# Patient Record
Sex: FEMALE | Race: OTHER | HISPANIC OR LATINO | URBAN - METROPOLITAN AREA
[De-identification: names, ages, dates, MRNs, and addresses within clinical notes are randomized per-mention and may not be internally consistent; named-entity substitution may affect disease eponyms.]

---

## 2019-04-01 ENCOUNTER — OUTPATIENT (OUTPATIENT)
Dept: OUTPATIENT SERVICES | Facility: HOSPITAL | Age: 84
LOS: 1 days | End: 2019-04-01
Payer: MEDICAID

## 2019-04-01 PROCEDURE — G9001: CPT

## 2019-04-20 ENCOUNTER — INPATIENT (INPATIENT)
Facility: HOSPITAL | Age: 84
LOS: 13 days | Discharge: HOME | DRG: 381 | End: 2019-05-04
Attending: STUDENT IN AN ORGANIZED HEALTH CARE EDUCATION/TRAINING PROGRAM | Admitting: STUDENT IN AN ORGANIZED HEALTH CARE EDUCATION/TRAINING PROGRAM
Payer: MEDICAID

## 2019-04-20 VITALS
WEIGHT: 126.99 LBS | HEART RATE: 91 BPM | TEMPERATURE: 98 F | DIASTOLIC BLOOD PRESSURE: 82 MMHG | OXYGEN SATURATION: 97 % | HEIGHT: 65 IN | SYSTOLIC BLOOD PRESSURE: 136 MMHG | RESPIRATION RATE: 18 BRPM

## 2019-04-20 DIAGNOSIS — K31.0 ACUTE DILATATION OF STOMACH: ICD-10-CM

## 2019-04-20 LAB
ALBUMIN SERPL ELPH-MCNC: 3.9 G/DL — SIGNIFICANT CHANGE UP (ref 3.3–5.2)
ALP SERPL-CCNC: 70 U/L — SIGNIFICANT CHANGE UP (ref 40–120)
ALT FLD-CCNC: 11 U/L — SIGNIFICANT CHANGE UP
ANION GAP SERPL CALC-SCNC: 15 MMOL/L — SIGNIFICANT CHANGE UP (ref 5–17)
APTT BLD: 32.3 SEC — SIGNIFICANT CHANGE UP (ref 27.5–36.3)
AST SERPL-CCNC: 20 U/L — SIGNIFICANT CHANGE UP
BASOPHILS # BLD AUTO: 0 K/UL — SIGNIFICANT CHANGE UP (ref 0–0.2)
BASOPHILS NFR BLD AUTO: 0.2 % — SIGNIFICANT CHANGE UP (ref 0–2)
BILIRUB SERPL-MCNC: 0.3 MG/DL — LOW (ref 0.4–2)
BUN SERPL-MCNC: 18 MG/DL — SIGNIFICANT CHANGE UP (ref 8–20)
CALCIUM SERPL-MCNC: 10.2 MG/DL — SIGNIFICANT CHANGE UP (ref 8.6–10.2)
CHLORIDE SERPL-SCNC: 93 MMOL/L — LOW (ref 98–107)
CK SERPL-CCNC: 72 U/L — SIGNIFICANT CHANGE UP (ref 25–170)
CO2 SERPL-SCNC: 28 MMOL/L — SIGNIFICANT CHANGE UP (ref 22–29)
CREAT SERPL-MCNC: 0.67 MG/DL — SIGNIFICANT CHANGE UP (ref 0.5–1.3)
EOSINOPHIL # BLD AUTO: 0 K/UL — SIGNIFICANT CHANGE UP (ref 0–0.5)
EOSINOPHIL NFR BLD AUTO: 0.2 % — SIGNIFICANT CHANGE UP (ref 0–6)
GLUCOSE SERPL-MCNC: 148 MG/DL — HIGH (ref 70–115)
HCT VFR BLD CALC: 42.4 % — SIGNIFICANT CHANGE UP (ref 37–47)
HGB BLD-MCNC: 14.6 G/DL — SIGNIFICANT CHANGE UP (ref 12–16)
INR BLD: 1.01 RATIO — SIGNIFICANT CHANGE UP (ref 0.88–1.16)
LIDOCAIN IGE QN: 12 U/L — LOW (ref 22–51)
LYMPHOCYTES # BLD AUTO: 1.1 K/UL — SIGNIFICANT CHANGE UP (ref 1–4.8)
LYMPHOCYTES # BLD AUTO: 18.7 % — LOW (ref 20–55)
MAGNESIUM SERPL-MCNC: 1.8 MG/DL — SIGNIFICANT CHANGE UP (ref 1.6–2.6)
MCHC RBC-ENTMCNC: 31.3 PG — HIGH (ref 27–31)
MCHC RBC-ENTMCNC: 34.4 G/DL — SIGNIFICANT CHANGE UP (ref 32–36)
MCV RBC AUTO: 90.8 FL — SIGNIFICANT CHANGE UP (ref 81–99)
MONOCYTES # BLD AUTO: 0.4 K/UL — SIGNIFICANT CHANGE UP (ref 0–0.8)
MONOCYTES NFR BLD AUTO: 7.5 % — SIGNIFICANT CHANGE UP (ref 3–10)
NEUTROPHILS # BLD AUTO: 4.3 K/UL — SIGNIFICANT CHANGE UP (ref 1.8–8)
NEUTROPHILS NFR BLD AUTO: 73.2 % — HIGH (ref 37–73)
PLATELET # BLD AUTO: 251 K/UL — SIGNIFICANT CHANGE UP (ref 150–400)
POTASSIUM SERPL-MCNC: 4.6 MMOL/L — SIGNIFICANT CHANGE UP (ref 3.5–5.3)
POTASSIUM SERPL-SCNC: 4.6 MMOL/L — SIGNIFICANT CHANGE UP (ref 3.5–5.3)
PROT SERPL-MCNC: 7.7 G/DL — SIGNIFICANT CHANGE UP (ref 6.6–8.7)
PROTHROM AB SERPL-ACNC: 11.6 SEC — SIGNIFICANT CHANGE UP (ref 10–12.9)
RBC # BLD: 4.67 M/UL — SIGNIFICANT CHANGE UP (ref 4.4–5.2)
RBC # FLD: 13 % — SIGNIFICANT CHANGE UP (ref 11–15.6)
SODIUM SERPL-SCNC: 136 MMOL/L — SIGNIFICANT CHANGE UP (ref 135–145)
TSH SERPL-MCNC: 3.68 UIU/ML — SIGNIFICANT CHANGE UP (ref 0.27–4.2)
WBC # BLD: 5.8 K/UL — SIGNIFICANT CHANGE UP (ref 4.8–10.8)
WBC # FLD AUTO: 5.8 K/UL — SIGNIFICANT CHANGE UP (ref 4.8–10.8)

## 2019-04-20 PROCEDURE — 74177 CT ABD & PELVIS W/CONTRAST: CPT | Mod: 26

## 2019-04-20 PROCEDURE — 71045 X-RAY EXAM CHEST 1 VIEW: CPT | Mod: 26,76

## 2019-04-20 PROCEDURE — 99223 1ST HOSP IP/OBS HIGH 75: CPT

## 2019-04-20 PROCEDURE — 99285 EMERGENCY DEPT VISIT HI MDM: CPT

## 2019-04-20 RX ORDER — DEXTROSE 50 % IN WATER 50 %
25 SYRINGE (ML) INTRAVENOUS ONCE
Qty: 0 | Refills: 0 | Status: DISCONTINUED | OUTPATIENT
Start: 2019-04-20 | End: 2019-05-04

## 2019-04-20 RX ORDER — ONDANSETRON 8 MG/1
4 TABLET, FILM COATED ORAL ONCE
Qty: 0 | Refills: 0 | Status: COMPLETED | OUTPATIENT
Start: 2019-04-20 | End: 2019-04-20

## 2019-04-20 RX ORDER — INSULIN LISPRO 100/ML
VIAL (ML) SUBCUTANEOUS
Qty: 0 | Refills: 0 | Status: DISCONTINUED | OUTPATIENT
Start: 2019-04-20 | End: 2019-04-26

## 2019-04-20 RX ORDER — GLUCAGON INJECTION, SOLUTION 0.5 MG/.1ML
1 INJECTION, SOLUTION SUBCUTANEOUS ONCE
Qty: 0 | Refills: 0 | Status: DISCONTINUED | OUTPATIENT
Start: 2019-04-20 | End: 2019-05-04

## 2019-04-20 RX ORDER — SODIUM CHLORIDE 9 MG/ML
1000 INJECTION INTRAMUSCULAR; INTRAVENOUS; SUBCUTANEOUS
Qty: 0 | Refills: 0 | Status: DISCONTINUED | OUTPATIENT
Start: 2019-04-20 | End: 2019-04-25

## 2019-04-20 RX ORDER — SODIUM CHLORIDE 9 MG/ML
1000 INJECTION, SOLUTION INTRAVENOUS
Qty: 0 | Refills: 0 | Status: DISCONTINUED | OUTPATIENT
Start: 2019-04-20 | End: 2019-05-04

## 2019-04-20 RX ORDER — ONDANSETRON 8 MG/1
4 TABLET, FILM COATED ORAL EVERY 6 HOURS
Qty: 0 | Refills: 0 | Status: DISCONTINUED | OUTPATIENT
Start: 2019-04-20 | End: 2019-05-04

## 2019-04-20 RX ORDER — ENOXAPARIN SODIUM 100 MG/ML
40 INJECTION SUBCUTANEOUS DAILY
Qty: 0 | Refills: 0 | Status: DISCONTINUED | OUTPATIENT
Start: 2019-04-20 | End: 2019-04-21

## 2019-04-20 RX ORDER — PANTOPRAZOLE SODIUM 20 MG/1
40 TABLET, DELAYED RELEASE ORAL EVERY 12 HOURS
Qty: 0 | Refills: 0 | Status: DISCONTINUED | OUTPATIENT
Start: 2019-04-20 | End: 2019-04-21

## 2019-04-20 RX ORDER — DEXTROSE 50 % IN WATER 50 %
15 SYRINGE (ML) INTRAVENOUS ONCE
Qty: 0 | Refills: 0 | Status: DISCONTINUED | OUTPATIENT
Start: 2019-04-20 | End: 2019-05-04

## 2019-04-20 RX ORDER — SODIUM CHLORIDE 9 MG/ML
1000 INJECTION INTRAMUSCULAR; INTRAVENOUS; SUBCUTANEOUS
Qty: 0 | Refills: 0 | Status: DISCONTINUED | OUTPATIENT
Start: 2019-04-20 | End: 2019-04-21

## 2019-04-20 RX ORDER — MORPHINE SULFATE 50 MG/1
2 CAPSULE, EXTENDED RELEASE ORAL ONCE
Qty: 0 | Refills: 0 | Status: DISCONTINUED | OUTPATIENT
Start: 2019-04-20 | End: 2019-04-20

## 2019-04-20 RX ORDER — SODIUM CHLORIDE 9 MG/ML
500 INJECTION INTRAMUSCULAR; INTRAVENOUS; SUBCUTANEOUS ONCE
Qty: 0 | Refills: 0 | Status: COMPLETED | OUTPATIENT
Start: 2019-04-20 | End: 2019-04-20

## 2019-04-20 RX ORDER — INSULIN LISPRO 100/ML
VIAL (ML) SUBCUTANEOUS
Qty: 0 | Refills: 0 | Status: DISCONTINUED | OUTPATIENT
Start: 2019-04-20 | End: 2019-04-20

## 2019-04-20 RX ORDER — DEXTROSE 50 % IN WATER 50 %
12.5 SYRINGE (ML) INTRAVENOUS ONCE
Qty: 0 | Refills: 0 | Status: DISCONTINUED | OUTPATIENT
Start: 2019-04-20 | End: 2019-05-04

## 2019-04-20 RX ADMIN — SODIUM CHLORIDE 150 MILLILITER(S): 9 INJECTION INTRAMUSCULAR; INTRAVENOUS; SUBCUTANEOUS at 13:24

## 2019-04-20 RX ADMIN — MORPHINE SULFATE 2 MILLIGRAM(S): 50 CAPSULE, EXTENDED RELEASE ORAL at 19:41

## 2019-04-20 RX ADMIN — MORPHINE SULFATE 2 MILLIGRAM(S): 50 CAPSULE, EXTENDED RELEASE ORAL at 22:03

## 2019-04-20 RX ADMIN — SODIUM CHLORIDE 250 MILLILITER(S): 9 INJECTION INTRAMUSCULAR; INTRAVENOUS; SUBCUTANEOUS at 22:04

## 2019-04-20 RX ADMIN — SODIUM CHLORIDE 100 MILLILITER(S): 9 INJECTION INTRAMUSCULAR; INTRAVENOUS; SUBCUTANEOUS at 23:04

## 2019-04-20 RX ADMIN — ENOXAPARIN SODIUM 40 MILLIGRAM(S): 100 INJECTION SUBCUTANEOUS at 22:06

## 2019-04-20 RX ADMIN — ONDANSETRON 4 MILLIGRAM(S): 8 TABLET, FILM COATED ORAL at 19:42

## 2019-04-20 RX ADMIN — SODIUM CHLORIDE 1000 MILLILITER(S): 9 INJECTION INTRAMUSCULAR; INTRAVENOUS; SUBCUTANEOUS at 13:24

## 2019-04-20 RX ADMIN — SODIUM CHLORIDE 150 MILLILITER(S): 9 INJECTION INTRAMUSCULAR; INTRAVENOUS; SUBCUTANEOUS at 19:44

## 2019-04-20 RX ADMIN — ONDANSETRON 4 MILLIGRAM(S): 8 TABLET, FILM COATED ORAL at 13:24

## 2019-04-20 NOTE — ED PROVIDER NOTE - CLINICAL SUMMARY MEDICAL DECISION MAKING FREE TEXT BOX
failure to thrive, vomited today with some abd pain and weight loss. urine, ct abd/pel, labs, fluids, reassess.

## 2019-04-20 NOTE — ED PROVIDER NOTE - PHYSICAL EXAMINATION
Gen: No acute distress, non toxic  HEENT: Mucous membranes moist, pink conjunctivae, EOMI  CV: RRR, nl s1/s2.  Resp: CTAB, normal rate and effort  GI: abd distended, tympanic, soft, no rebound. minor wincing with palpation of abd.   : No CVAT  Neuro: Awake, moving all 4 extremities  MSK: No spine or joint tenderness to palpation  Skin: No rashes. intact and perfused.

## 2019-04-20 NOTE — H&P ADULT - NSHPLABSRESULTS_GEN_ALL_CORE
LABS:                        14.6   5.8   )-----------( 251      ( 20 Apr 2019 13:20 )             42.4     04-20    136  |  93<L>  |  18.0  ----------------------------<  148<H>  4.6   |  28.0  |  0.67    Ca    10.2      20 Apr 2019 13:20  Mg     1.8     04-20    TPro  7.7  /  Alb  3.9  /  TBili  0.3<L>  /  DBili  x   /  AST  20  /  ALT  11  /  AlkPhos  70  04-20    PT/INR - ( 20 Apr 2019 13:20 )   PT: 11.6 sec;   INR: 1.01 ratio    PTT - ( 20 Apr 2019 13:20 )  PTT:32.3 sec

## 2019-04-20 NOTE — ED STATDOCS - PROGRESS NOTE DETAILS
89 y/o F pt with significant PMHx of Arthritis, DM and Dementia presents to the ED c/o failure to thrive onset 2-3 weeks ago. Family at bedside reports decreased PO intake/appetite, vomiting (brown in color), abdominal pain, unexpected weight loss (approximately 17 pounds) and refusal to speak. She was seen at the clinic two days ago who referred her to a specialist but her appointment is not until 5/7. Pt has not taken her medications today. Denies fever, chills, CP, SOB, diarrhea, or HA. No further complaints at this time.   Patient will be sent to the main for further evaluation and work up. Initial orders placed.

## 2019-04-20 NOTE — H&P ADULT - ASSESSMENT
89F w. dementia, dm otherwise healthy presenting w/ gastric outlet obstruction     npo, ivf  NGT  protonix  bid   GI consult for EGD  dvt ppx, scds

## 2019-04-20 NOTE — ED ADULT NURSE REASSESSMENT NOTE - NS ED NURSE REASSESS COMMENT FT1
Pt resting on stretcher no pain behaviors noted.  NGT to low suction with 700ml brown drainage.  Pt attempted to pull at tube a few times, family at bedside discouraging pt.  admission orders received,  awaiting bed assignment.

## 2019-04-20 NOTE — ED ADULT TRIAGE NOTE - CHIEF COMPLAINT QUOTE
patient failure to thrive for the past 2-3 weeks, hasn't been eating or drinking. hx of dementia. as per daughter the patient vomited brown emesis 2 weeks ago, a little vomit today, patient has been refuses to speak, but pointed to her belly when asked what's wrong.

## 2019-04-20 NOTE — ED ADULT NURSE NOTE - OBJECTIVE STATEMENT
Pt brought in by her daughter who states that pt has not been eating or drinking and has not been speaking.  Pt has hx of dementia but when daughter asks her if she's having pain pt will point to her belly.  Daughter states that pt is in pain but no pain behaviors present at this time.

## 2019-04-20 NOTE — ED PROVIDER NOTE - OBJECTIVE STATEMENT
89 y/o female hx dm, dementia, arthritis present for failure to thrive for 2-3 weeks. Per grandson (who translated) and daughter, pt has been eating less and less, vomited once today. Didn't take meds today, took them yesterday. Pt doesn't speak much at baseline, per family states may be even less recently. Points to abd when asked whats wrong. Weight loss ~17 pounds over past few months.     no ros given mental status/not answering questions.

## 2019-04-20 NOTE — ED ADULT NURSE NOTE - NSIMPLEMENTINTERV_GEN_ALL_ED
Implemented All Fall with Harm Risk Interventions:  [place] to call system. Call bell, personal items and telephone within reach. Instruct patient to call for assistance. Room bathroom lighting operational. Non-slip footwear when patient is off stretcher. Physically safe environment: no spills, clutter or unnecessary equipment. Stretcher in lowest position, wheels locked, appropriate side rails in place. Provide visual cue, wrist band, yellow gown, etc. Monitor gait and stability. Monitor for mental status changes and reorient to person, place, and time. Review medications for side effects contributing to fall risk. Reinforce activity limits and safety measures with patient and family. Provide visual clues: red socks.

## 2019-04-20 NOTE — H&P ADULT - NSHPPHYSICALEXAM_GEN_ALL_CORE
Gen NAD  HEENT eomi, mucus membranes dry   CTABL  s1s2  Abd s, nd, mild EG ttp , -G/R  Ext warm, <2sec cap refill

## 2019-04-20 NOTE — H&P ADULT - HISTORY OF PRESENT ILLNESS
89F w/ hx of dementia, DM presenting to ER w/ emesis, decreased po intake and EG pain. Pts family states that for past several weeks pt has had decreased PO intake, previously to this had normal appetite and was eating well. 2 weeks ago she had an episode of bilious emesis and again ths morning she had several episodes of emesis. Family denies changes in BMs, melena or hematochezia.   Pt has never had colonoscopy egd and denies fam hx of cancer. 89F w/ hx of dementia, DM presenting to ER w/ emesis, decreased po intake and EG pain. Pts family states that for past several weeks pt has had decreased PO intake, prior to this had normal appetite and was eating well. 2 weeks ago she had an episode of bilious emesis and again this morning she had several episodes of emesis. Family denies changes in BMs, melena or hematochezia.   Pt has never had colonoscopy egd and denies fam hx of cancer.

## 2019-04-21 DIAGNOSIS — K31.1 ADULT HYPERTROPHIC PYLORIC STENOSIS: ICD-10-CM

## 2019-04-21 DIAGNOSIS — F03.90 UNSPECIFIED DEMENTIA WITHOUT BEHAVIORAL DISTURBANCE: ICD-10-CM

## 2019-04-21 DIAGNOSIS — E11.9 TYPE 2 DIABETES MELLITUS WITHOUT COMPLICATIONS: ICD-10-CM

## 2019-04-21 LAB
ANION GAP SERPL CALC-SCNC: 12 MMOL/L — SIGNIFICANT CHANGE UP (ref 5–17)
BUN SERPL-MCNC: 15 MG/DL — SIGNIFICANT CHANGE UP (ref 8–20)
CALCIUM SERPL-MCNC: 9.1 MG/DL — SIGNIFICANT CHANGE UP (ref 8.6–10.2)
CHLORIDE SERPL-SCNC: 96 MMOL/L — LOW (ref 98–107)
CO2 SERPL-SCNC: 32 MMOL/L — HIGH (ref 22–29)
CREAT SERPL-MCNC: 0.68 MG/DL — SIGNIFICANT CHANGE UP (ref 0.5–1.3)
GLUCOSE BLDC GLUCOMTR-MCNC: 101 MG/DL — HIGH (ref 70–99)
GLUCOSE BLDC GLUCOMTR-MCNC: 68 MG/DL — LOW (ref 70–99)
GLUCOSE BLDC GLUCOMTR-MCNC: 73 MG/DL — SIGNIFICANT CHANGE UP (ref 70–99)
GLUCOSE BLDC GLUCOMTR-MCNC: 77 MG/DL — SIGNIFICANT CHANGE UP (ref 70–99)
GLUCOSE BLDC GLUCOMTR-MCNC: 85 MG/DL — SIGNIFICANT CHANGE UP (ref 70–99)
GLUCOSE SERPL-MCNC: 95 MG/DL — SIGNIFICANT CHANGE UP (ref 70–115)
HBA1C BLD-MCNC: 6.9 % — HIGH (ref 4–5.6)
HCT VFR BLD CALC: 38.5 % — SIGNIFICANT CHANGE UP (ref 37–47)
HGB BLD-MCNC: 12.9 G/DL — SIGNIFICANT CHANGE UP (ref 12–16)
MAGNESIUM SERPL-MCNC: 1.6 MG/DL — SIGNIFICANT CHANGE UP (ref 1.6–2.6)
MCHC RBC-ENTMCNC: 30.9 PG — SIGNIFICANT CHANGE UP (ref 27–31)
MCHC RBC-ENTMCNC: 33.5 G/DL — SIGNIFICANT CHANGE UP (ref 32–36)
MCV RBC AUTO: 92.1 FL — SIGNIFICANT CHANGE UP (ref 81–99)
PHOSPHATE SERPL-MCNC: 3.3 MG/DL — SIGNIFICANT CHANGE UP (ref 2.4–4.7)
PLATELET # BLD AUTO: 236 K/UL — SIGNIFICANT CHANGE UP (ref 150–400)
POTASSIUM SERPL-MCNC: 3.8 MMOL/L — SIGNIFICANT CHANGE UP (ref 3.5–5.3)
POTASSIUM SERPL-SCNC: 3.8 MMOL/L — SIGNIFICANT CHANGE UP (ref 3.5–5.3)
RBC # BLD: 4.18 M/UL — LOW (ref 4.4–5.2)
RBC # FLD: 13 % — SIGNIFICANT CHANGE UP (ref 11–15.6)
SODIUM SERPL-SCNC: 140 MMOL/L — SIGNIFICANT CHANGE UP (ref 135–145)
WBC # BLD: 4 K/UL — LOW (ref 4.8–10.8)
WBC # FLD AUTO: 4 K/UL — LOW (ref 4.8–10.8)

## 2019-04-21 PROCEDURE — 99222 1ST HOSP IP/OBS MODERATE 55: CPT

## 2019-04-21 PROCEDURE — 93306 TTE W/DOPPLER COMPLETE: CPT | Mod: 26

## 2019-04-21 PROCEDURE — 93010 ELECTROCARDIOGRAM REPORT: CPT

## 2019-04-21 PROCEDURE — 99223 1ST HOSP IP/OBS HIGH 75: CPT

## 2019-04-21 RX ORDER — POTASSIUM PHOSPHATE, MONOBASIC POTASSIUM PHOSPHATE, DIBASIC 236; 224 MG/ML; MG/ML
15 INJECTION, SOLUTION INTRAVENOUS ONCE
Qty: 0 | Refills: 0 | Status: COMPLETED | OUTPATIENT
Start: 2019-04-21 | End: 2019-04-21

## 2019-04-21 RX ORDER — MAGNESIUM SULFATE 500 MG/ML
2 VIAL (ML) INJECTION ONCE
Qty: 0 | Refills: 0 | Status: COMPLETED | OUTPATIENT
Start: 2019-04-21 | End: 2019-04-21

## 2019-04-21 RX ORDER — PANTOPRAZOLE SODIUM 20 MG/1
40 TABLET, DELAYED RELEASE ORAL
Qty: 0 | Refills: 0 | Status: DISCONTINUED | OUTPATIENT
Start: 2019-04-21 | End: 2019-04-23

## 2019-04-21 RX ORDER — SODIUM CHLORIDE 9 MG/ML
1000 INJECTION, SOLUTION INTRAVENOUS
Qty: 0 | Refills: 0 | Status: DISCONTINUED | OUTPATIENT
Start: 2019-04-21 | End: 2019-04-22

## 2019-04-21 RX ORDER — MAGNESIUM SULFATE 500 MG/ML
1 VIAL (ML) INJECTION ONCE
Qty: 0 | Refills: 0 | Status: DISCONTINUED | OUTPATIENT
Start: 2019-04-21 | End: 2019-04-21

## 2019-04-21 RX ADMIN — POTASSIUM PHOSPHATE, MONOBASIC POTASSIUM PHOSPHATE, DIBASIC 62.5 MILLIMOLE(S): 236; 224 INJECTION, SOLUTION INTRAVENOUS at 18:15

## 2019-04-21 RX ADMIN — Medication 50 GRAM(S): at 22:23

## 2019-04-21 RX ADMIN — SODIUM CHLORIDE 150 MILLILITER(S): 9 INJECTION, SOLUTION INTRAVENOUS at 22:46

## 2019-04-21 RX ADMIN — PANTOPRAZOLE SODIUM 40 MILLIGRAM(S): 20 TABLET, DELAYED RELEASE ORAL at 05:38

## 2019-04-21 RX ADMIN — ENOXAPARIN SODIUM 40 MILLIGRAM(S): 100 INJECTION SUBCUTANEOUS at 12:38

## 2019-04-21 RX ADMIN — PANTOPRAZOLE SODIUM 40 MILLIGRAM(S): 20 TABLET, DELAYED RELEASE ORAL at 17:49

## 2019-04-21 NOTE — PROGRESS NOTE ADULT - SUBJECTIVE AND OBJECTIVE BOX
INTERVAL HPI/OVERNIGHT EVENTS:  No acute overnight events reported. Patient seen this morning with her daughter at bedside. Patient with NGT to suction, dark-thick gastric contents seen on output. NTG flushed. Patient NPO, on IVFs. Patient denies major complaints nor issues. Afebrile, nontachy, normotensive.         MEDICATIONS  (STANDING):  dextrose 5%. 1000 milliLiter(s) (50 mL/Hr) IV Continuous <Continuous>  dextrose 50% Injectable 12.5 Gram(s) IV Push once  dextrose 50% Injectable 25 Gram(s) IV Push once  dextrose 50% Injectable 25 Gram(s) IV Push once  enoxaparin Injectable 40 milliGRAM(s) SubCutaneous daily  insulin lispro (HumaLOG) corrective regimen sliding scale   SubCutaneous three times a day before meals  pantoprazole  Injectable 40 milliGRAM(s) IV Push two times a day  sodium chloride 0.9%. 1000 milliLiter(s) (100 mL/Hr) IV Continuous <Continuous>  sodium chloride 0.9%. 1000 milliLiter(s) (150 mL/Hr) IV Continuous <Continuous>    MEDICATIONS  (PRN):  dextrose 40% Gel 15 Gram(s) Oral once PRN Blood Glucose LESS THAN 70 milliGRAM(s)/deciliter  glucagon  Injectable 1 milliGRAM(s) IntraMuscular once PRN Glucose LESS THAN 70 milligrams/deciliter  ondansetron Injectable 4 milliGRAM(s) IV Push every 6 hours PRN Nausea and/or Vomiting      Vital Signs Last 24 Hrs  T(C): 36.7 (21 Apr 2019 08:09), Max: 36.9 (20 Apr 2019 12:27)  T(F): 98 (21 Apr 2019 08:09), Max: 98.5 (20 Apr 2019 12:27)  HR: 84 (21 Apr 2019 02:42) (84 - 92)  BP: 129/76 (21 Apr 2019 08:09) (112/69 - 149/72)  BP(mean): --  RR: 18 (21 Apr 2019 08:09) (16 - 18)  SpO2: 98% (21 Apr 2019 08:09) (95% - 99%)    PE  Gen:  Pulm:  CV:  Abd:  :  Ext:  Vasc:  Neuro:      I&O's Detail    20 Apr 2019 07:01  -  21 Apr 2019 07:00  --------------------------------------------------------  IN:    sodium chloride 0.9%.: 500 mL  Total IN: 500 mL    OUT:    Nasoenteral Tube: 700 mL  Total OUT: 700 mL    Total NET: -200 mL          LABS:                        12.9   4.0   )-----------( 236      ( 21 Apr 2019 06:56 )             38.5     04-21    140  |  96<L>  |  15.0  ----------------------------<  95  3.8   |  32.0<H>  |  0.68    Ca    9.1      21 Apr 2019 06:56  Phos  3.3     04-21  Mg     1.6     04-21    TPro  7.7  /  Alb  3.9  /  TBili  0.3<L>  /  DBili  x   /  AST  20  /  ALT  11  /  AlkPhos  70  04-20    PT/INR - ( 20 Apr 2019 13:20 )   PT: 11.6 sec;   INR: 1.01 ratio         PTT - ( 20 Apr 2019 13:20 )  PTT:32.3 sec      RADIOLOGY & ADDITIONAL STUDIES: INTERVAL HPI/OVERNIGHT EVENTS:  No acute overnight events reported. Patient seen this morning with her daughter at bedside. Patient with NGT to suction, dark-thick gastric contents seen on output. NTG flushed. Patient NPO, on IVFs. Patient denies major complaints nor issues. Afebrile, nontachy, normotensive.         MEDICATIONS  (STANDING):  dextrose 5%. 1000 milliLiter(s) (50 mL/Hr) IV Continuous <Continuous>  dextrose 50% Injectable 12.5 Gram(s) IV Push once  dextrose 50% Injectable 25 Gram(s) IV Push once  dextrose 50% Injectable 25 Gram(s) IV Push once  enoxaparin Injectable 40 milliGRAM(s) SubCutaneous daily  insulin lispro (HumaLOG) corrective regimen sliding scale   SubCutaneous three times a day before meals  pantoprazole  Injectable 40 milliGRAM(s) IV Push two times a day  sodium chloride 0.9%. 1000 milliLiter(s) (100 mL/Hr) IV Continuous <Continuous>  sodium chloride 0.9%. 1000 milliLiter(s) (150 mL/Hr) IV Continuous <Continuous>    MEDICATIONS  (PRN):  dextrose 40% Gel 15 Gram(s) Oral once PRN Blood Glucose LESS THAN 70 milliGRAM(s)/deciliter  glucagon  Injectable 1 milliGRAM(s) IntraMuscular once PRN Glucose LESS THAN 70 milligrams/deciliter  ondansetron Injectable 4 milliGRAM(s) IV Push every 6 hours PRN Nausea and/or Vomiting      Vital Signs Last 24 Hrs  T(C): 36.7 (21 Apr 2019 08:09), Max: 36.9 (20 Apr 2019 12:27)  T(F): 98 (21 Apr 2019 08:09), Max: 98.5 (20 Apr 2019 12:27)  HR: 84 (21 Apr 2019 02:42) (84 - 92)  BP: 129/76 (21 Apr 2019 08:09) (112/69 - 149/72)  BP(mean): --  RR: 18 (21 Apr 2019 08:09) (16 - 18)  SpO2: 98% (21 Apr 2019 08:09) (95% - 99%)    PE  Gen: Not in acute distress  Pulm: Nonlabored breathing  CV: S1, S2  Abd: Soft, nondistended, mild epigastric tenderness, no guarding, no rebound  : No suprapubic tenderness  Ext: no pitting edema B/L  Vasc: 2+ radial and PT pulses B/L  Neuro: AAOX3      I&O's Detail    20 Apr 2019 07:01  -  21 Apr 2019 07:00  --------------------------------------------------------  IN:    sodium chloride 0.9%.: 500 mL  Total IN: 500 mL    OUT:    Nasoenteral Tube: 700 mL  Total OUT: 700 mL    Total NET: -200 mL          LABS:                        12.9   4.0   )-----------( 236      ( 21 Apr 2019 06:56 )             38.5     04-21    140  |  96<L>  |  15.0  ----------------------------<  95  3.8   |  32.0<H>  |  0.68    Ca    9.1      21 Apr 2019 06:56  Phos  3.3     04-21  Mg     1.6     04-21    TPro  7.7  /  Alb  3.9  /  TBili  0.3<L>  /  DBili  x   /  AST  20  /  ALT  11  /  AlkPhos  70  04-20    PT/INR - ( 20 Apr 2019 13:20 )   PT: 11.6 sec;   INR: 1.01 ratio         PTT - ( 20 Apr 2019 13:20 )  PTT:32.3 sec      RADIOLOGY & ADDITIONAL STUDIES: INTERVAL HPI/OVERNIGHT EVENTS:  No acute overnight events reported. Patient seen this morning with her daughter at bedside. Patient with NGT to suction, dark-thick gastric contents seen on output. NGT flushed. Patient NPO, on IVFs. Patient denies major complaints nor issues. Afebrile, nontachy, normotensive.         MEDICATIONS  (STANDING):  dextrose 5%. 1000 milliLiter(s) (50 mL/Hr) IV Continuous <Continuous>  dextrose 50% Injectable 12.5 Gram(s) IV Push once  dextrose 50% Injectable 25 Gram(s) IV Push once  dextrose 50% Injectable 25 Gram(s) IV Push once  enoxaparin Injectable 40 milliGRAM(s) SubCutaneous daily  insulin lispro (HumaLOG) corrective regimen sliding scale   SubCutaneous three times a day before meals  pantoprazole  Injectable 40 milliGRAM(s) IV Push two times a day  sodium chloride 0.9%. 1000 milliLiter(s) (100 mL/Hr) IV Continuous <Continuous>  sodium chloride 0.9%. 1000 milliLiter(s) (150 mL/Hr) IV Continuous <Continuous>    MEDICATIONS  (PRN):  dextrose 40% Gel 15 Gram(s) Oral once PRN Blood Glucose LESS THAN 70 milliGRAM(s)/deciliter  glucagon  Injectable 1 milliGRAM(s) IntraMuscular once PRN Glucose LESS THAN 70 milligrams/deciliter  ondansetron Injectable 4 milliGRAM(s) IV Push every 6 hours PRN Nausea and/or Vomiting      Vital Signs Last 24 Hrs  T(C): 36.7 (21 Apr 2019 08:09), Max: 36.9 (20 Apr 2019 12:27)  T(F): 98 (21 Apr 2019 08:09), Max: 98.5 (20 Apr 2019 12:27)  HR: 84 (21 Apr 2019 02:42) (84 - 92)  BP: 129/76 (21 Apr 2019 08:09) (112/69 - 149/72)  BP(mean): --  RR: 18 (21 Apr 2019 08:09) (16 - 18)  SpO2: 98% (21 Apr 2019 08:09) (95% - 99%)    PE  Gen: Not in acute distress  Pulm: Nonlabored breathing  CV: S1, S2  Abd: Soft, nondistended, mild epigastric tenderness, no guarding, no rebound  : No suprapubic tenderness  Ext: no pitting edema B/L  Vasc: 2+ radial and PT pulses B/L  Neuro: AAOX3      I&O's Detail    20 Apr 2019 07:01  -  21 Apr 2019 07:00  --------------------------------------------------------  IN:    sodium chloride 0.9%.: 500 mL  Total IN: 500 mL    OUT:    Nasoenteral Tube: 700 mL  Total OUT: 700 mL    Total NET: -200 mL          LABS:                        12.9   4.0   )-----------( 236      ( 21 Apr 2019 06:56 )             38.5     04-21    140  |  96<L>  |  15.0  ----------------------------<  95  3.8   |  32.0<H>  |  0.68    Ca    9.1      21 Apr 2019 06:56  Phos  3.3     04-21  Mg     1.6     04-21    TPro  7.7  /  Alb  3.9  /  TBili  0.3<L>  /  DBili  x   /  AST  20  /  ALT  11  /  AlkPhos  70  04-20    PT/INR - ( 20 Apr 2019 13:20 )   PT: 11.6 sec;   INR: 1.01 ratio         PTT - ( 20 Apr 2019 13:20 )  PTT:32.3 sec      RADIOLOGY & ADDITIONAL STUDIES:

## 2019-04-21 NOTE — PROGRESS NOTE ADULT - ASSESSMENT
A/P:  89F w. dementia, dm otherwise healthy presenting w/ gastric outlet obstruction     npo, ivf  NGT  protonix  bid   F/U GI consult  dvt ppx, scds

## 2019-04-22 LAB
ABO RH CONFIRMATION: SIGNIFICANT CHANGE UP
ANION GAP SERPL CALC-SCNC: 12 MMOL/L — SIGNIFICANT CHANGE UP (ref 5–17)
BUN SERPL-MCNC: 12 MG/DL — SIGNIFICANT CHANGE UP (ref 8–20)
CALCIUM SERPL-MCNC: 8.7 MG/DL — SIGNIFICANT CHANGE UP (ref 8.6–10.2)
CHLORIDE SERPL-SCNC: 100 MMOL/L — SIGNIFICANT CHANGE UP (ref 98–107)
CO2 SERPL-SCNC: 25 MMOL/L — SIGNIFICANT CHANGE UP (ref 22–29)
CREAT SERPL-MCNC: 0.54 MG/DL — SIGNIFICANT CHANGE UP (ref 0.5–1.3)
EOSINOPHIL # BLD AUTO: 0 K/UL — SIGNIFICANT CHANGE UP (ref 0–0.5)
EOSINOPHIL NFR BLD AUTO: 0.9 % — SIGNIFICANT CHANGE UP (ref 0–5)
GLUCOSE BLDC GLUCOMTR-MCNC: 113 MG/DL — HIGH (ref 70–99)
GLUCOSE BLDC GLUCOMTR-MCNC: 157 MG/DL — HIGH (ref 70–99)
GLUCOSE BLDC GLUCOMTR-MCNC: 196 MG/DL — HIGH (ref 70–99)
GLUCOSE SERPL-MCNC: 178 MG/DL — HIGH (ref 70–115)
HCT VFR BLD CALC: 34.8 % — LOW (ref 37–47)
HGB BLD-MCNC: 11.5 G/DL — LOW (ref 12–16)
LYMPHOCYTES # BLD AUTO: 0.8 K/UL — LOW (ref 1–4.8)
LYMPHOCYTES # BLD AUTO: 23.2 % — SIGNIFICANT CHANGE UP (ref 20–55)
MAGNESIUM SERPL-MCNC: 1.8 MG/DL — SIGNIFICANT CHANGE UP (ref 1.6–2.6)
MCHC RBC-ENTMCNC: 30.7 PG — SIGNIFICANT CHANGE UP (ref 27–31)
MCHC RBC-ENTMCNC: 33 G/DL — SIGNIFICANT CHANGE UP (ref 32–36)
MCV RBC AUTO: 93 FL — SIGNIFICANT CHANGE UP (ref 81–99)
MONOCYTES # BLD AUTO: 0.3 K/UL — SIGNIFICANT CHANGE UP (ref 0–0.8)
MONOCYTES NFR BLD AUTO: 8.7 % — SIGNIFICANT CHANGE UP (ref 3–10)
NEUTROPHILS # BLD AUTO: 2.2 K/UL — SIGNIFICANT CHANGE UP (ref 1.8–8)
NEUTROPHILS NFR BLD AUTO: 66.9 % — SIGNIFICANT CHANGE UP (ref 37–73)
PHOSPHATE SERPL-MCNC: 2.6 MG/DL — SIGNIFICANT CHANGE UP (ref 2.4–4.7)
PLATELET # BLD AUTO: 211 K/UL — SIGNIFICANT CHANGE UP (ref 150–400)
POTASSIUM SERPL-MCNC: 3.9 MMOL/L — SIGNIFICANT CHANGE UP (ref 3.5–5.3)
POTASSIUM SERPL-SCNC: 3.9 MMOL/L — SIGNIFICANT CHANGE UP (ref 3.5–5.3)
RBC # BLD: 3.74 M/UL — LOW (ref 4.4–5.2)
RBC # FLD: 13.1 % — SIGNIFICANT CHANGE UP (ref 11–15.6)
SODIUM SERPL-SCNC: 137 MMOL/L — SIGNIFICANT CHANGE UP (ref 135–145)
WBC # BLD: 3.3 K/UL — LOW (ref 4.8–10.8)
WBC # FLD AUTO: 3.3 K/UL — LOW (ref 4.8–10.8)

## 2019-04-22 PROCEDURE — 74018 RADEX ABDOMEN 1 VIEW: CPT | Mod: 26

## 2019-04-22 PROCEDURE — 43255 EGD CONTROL BLEEDING ANY: CPT

## 2019-04-22 PROCEDURE — 99231 SBSQ HOSP IP/OBS SF/LOW 25: CPT

## 2019-04-22 PROCEDURE — 93010 ELECTROCARDIOGRAM REPORT: CPT

## 2019-04-22 RX ORDER — SODIUM CHLORIDE 9 MG/ML
1000 INJECTION, SOLUTION INTRAVENOUS
Qty: 0 | Refills: 0 | Status: DISCONTINUED | OUTPATIENT
Start: 2019-04-22 | End: 2019-04-25

## 2019-04-22 RX ORDER — ACETAMINOPHEN 500 MG
1000 TABLET ORAL ONCE
Qty: 0 | Refills: 0 | Status: COMPLETED | OUTPATIENT
Start: 2019-04-22 | End: 2019-04-22

## 2019-04-22 RX ADMIN — Medication 400 MILLIGRAM(S): at 22:46

## 2019-04-22 RX ADMIN — Medication 2: at 17:13

## 2019-04-22 RX ADMIN — Medication 1000 MILLIGRAM(S): at 23:02

## 2019-04-22 RX ADMIN — PANTOPRAZOLE SODIUM 40 MILLIGRAM(S): 20 TABLET, DELAYED RELEASE ORAL at 17:59

## 2019-04-22 RX ADMIN — Medication 2: at 06:09

## 2019-04-22 RX ADMIN — SODIUM CHLORIDE 150 MILLILITER(S): 9 INJECTION, SOLUTION INTRAVENOUS at 06:25

## 2019-04-22 RX ADMIN — PANTOPRAZOLE SODIUM 40 MILLIGRAM(S): 20 TABLET, DELAYED RELEASE ORAL at 06:08

## 2019-04-22 NOTE — BRIEF OPERATIVE NOTE - NSICDXBRIEFPROCEDURE_GEN_ALL_CORE_FT
PROCEDURES:  Esophagogastroduodenoscopy (EGD) with cautery using heater probe 22-Apr-2019 12:28:27  LALY Young

## 2019-04-22 NOTE — OCCUPATIONAL THERAPY INITIAL EVALUATION ADULT - DEEP PRESSURE SENSATION, LUE, REHAB EVAL
not tested
Showering allowed/Walking-Indoors allowed/Walking-Outdoors allowed/Stairs allowed/Driving allowed

## 2019-04-22 NOTE — PROGRESS NOTE ADULT - SUBJECTIVE AND OBJECTIVE BOX
INTERVAL HPI/OVERNIGHT EVENTS:  No acute overnight events. Patient seen at bedside this AM with no major issues. Patient with dementia, daughter was at bedside, answered questions regarding plan of care. NGT output was 150, greatly reduced from previously which was 500, thick, dark red secretions noted in drain.         MEDICATIONS  (STANDING):  dextrose 5% + sodium chloride 0.3%. 1000 milliLiter(s) (150 mL/Hr) IV Continuous <Continuous>  dextrose 5%. 1000 milliLiter(s) (50 mL/Hr) IV Continuous <Continuous>  dextrose 50% Injectable 12.5 Gram(s) IV Push once  dextrose 50% Injectable 25 Gram(s) IV Push once  dextrose 50% Injectable 25 Gram(s) IV Push once  insulin lispro (HumaLOG) corrective regimen sliding scale   SubCutaneous three times a day before meals  pantoprazole  Injectable 40 milliGRAM(s) IV Push two times a day  sodium chloride 0.9%. 1000 milliLiter(s) (100 mL/Hr) IV Continuous <Continuous>    MEDICATIONS  (PRN):  dextrose 40% Gel 15 Gram(s) Oral once PRN Blood Glucose LESS THAN 70 milliGRAM(s)/deciliter  glucagon  Injectable 1 milliGRAM(s) IntraMuscular once PRN Glucose LESS THAN 70 milligrams/deciliter  ondansetron Injectable 4 milliGRAM(s) IV Push every 6 hours PRN Nausea and/or Vomiting      Vital Signs Last 24 Hrs  T(C): 36.8 (22 Apr 2019 07:47), Max: 37.2 (21 Apr 2019 20:27)  T(F): 98.2 (22 Apr 2019 07:47), Max: 98.9 (21 Apr 2019 20:27)  HR: 85 (22 Apr 2019 07:47) (80 - 85)  BP: 146/77 (22 Apr 2019 07:47) (130/71 - 149/78)  BP(mean): --  RR: 18 (22 Apr 2019 07:47) (18 - 18)  SpO2: 95% (22 Apr 2019 07:47) (94% - 96%)    PE  Gen: Not in acute distress  Pulm: Nonlabored breathing  CV: S1, S2  Abd: Soft, nondistended, mild epigastric tenderness, no guarding, no rebound  : No suprapubic tenderness  Ext: no pitting edema B/L  Vasc: 2+ radial and PT pulses B/L  Neuro: AAOX3      I&O's Detail    21 Apr 2019 07:01  -  22 Apr 2019 07:00  --------------------------------------------------------  IN:    dextrose 5% + sodium chloride 0.3%.: 1350 mL    sodium chloride 0.9%.: 300 mL  Total IN: 1650 mL    OUT:    Nasoenteral Tube: 150 mL  Total OUT: 150 mL    Total NET: 1500 mL          LABS:                        11.5   3.3   )-----------( 211      ( 22 Apr 2019 06:31 )             34.8     04-22    137  |  100  |  12.0  ----------------------------<  178<H>  3.9   |  25.0  |  0.54    Ca    8.7      22 Apr 2019 06:31  Phos  2.6     04-22  Mg     1.8     04-22    TPro  7.7  /  Alb  3.9  /  TBili  0.3<L>  /  DBili  x   /  AST  20  /  ALT  11  /  AlkPhos  70  04-20    PT/INR - ( 20 Apr 2019 13:20 )   PT: 11.6 sec;   INR: 1.01 ratio         PTT - ( 20 Apr 2019 13:20 )  PTT:32.3 sec      RADIOLOGY & ADDITIONAL STUDIES:

## 2019-04-22 NOTE — BRIEF OPERATIVE NOTE - OPERATION/FINDINGS
- Esophagus: LA grade D erosive esophagitis  - Stomach: Multiple gastric ulcers.  At the pylorus, there was an obstruction of unknown etiology.  On the superior aspect of the pylorus, there was a visible vessel that was treated with epinephrine injection and bipolar cautery using the gold probe.  There was no bleeding from the vessel at the end of the procedure.  - The duodenum was unable to be entered due to the obstruction.

## 2019-04-22 NOTE — PROGRESS NOTE ADULT - ASSESSMENT
A/P:  89F w/ dementia, DM otherwise healthy presenting w/ gastric outlet obstruction     npo, ivf  NGT  protonix  bid   GI pending endoscopy today  Cards cleared for endoscopy  dvt ppx, scds

## 2019-04-22 NOTE — OCCUPATIONAL THERAPY INITIAL EVALUATION ADULT - ADDITIONAL COMMENTS
Daughter present to provide history and in person interperter; Pt lives in a home with 3 [place] and rails to get in and no stairs inside and daughter helps pt get in and out of house with RW. Pt has aid on mondays to assist at home and tuesday through friday goes to a day care where they bath her and help her with bathroom needs if need and provide food and supervision. Pts daughter can assist when she goes home and she works as a  but can take time off if need. Pt was able to dress self and walk with RW at home prior with supervision. Pt is never alone according to daughter.

## 2019-04-22 NOTE — BRIEF OPERATIVE NOTE - COMMENTS
- LA grade D erosive esophagitis  - Multiple gastric ulcers, one with a visible vessel that was treated with epinephrine and bipolar cautery.  - Gastric outlet obstruction, etiology uncertain.    Will review the images with ACS attending to determine the next best course.  IV PPI BID.  Keep NPO  Nasogastric tube to LIWS  Maintain active type and screen

## 2019-04-23 DIAGNOSIS — Z71.89 OTHER SPECIFIED COUNSELING: ICD-10-CM

## 2019-04-23 LAB
ANION GAP SERPL CALC-SCNC: 9 MMOL/L — SIGNIFICANT CHANGE UP (ref 5–17)
BASOPHILS # BLD AUTO: 0 K/UL — SIGNIFICANT CHANGE UP (ref 0–0.2)
BASOPHILS NFR BLD AUTO: 0.2 % — SIGNIFICANT CHANGE UP (ref 0–2)
BUN SERPL-MCNC: 9 MG/DL — SIGNIFICANT CHANGE UP (ref 8–20)
CALCIUM SERPL-MCNC: 8.5 MG/DL — LOW (ref 8.6–10.2)
CHLORIDE SERPL-SCNC: 98 MMOL/L — SIGNIFICANT CHANGE UP (ref 98–107)
CO2 SERPL-SCNC: 26 MMOL/L — SIGNIFICANT CHANGE UP (ref 22–29)
CREAT SERPL-MCNC: 0.53 MG/DL — SIGNIFICANT CHANGE UP (ref 0.5–1.3)
EOSINOPHIL # BLD AUTO: 0 K/UL — SIGNIFICANT CHANGE UP (ref 0–0.5)
EOSINOPHIL NFR BLD AUTO: 0.9 % — SIGNIFICANT CHANGE UP (ref 0–6)
GLUCOSE BLDC GLUCOMTR-MCNC: 102 MG/DL — HIGH (ref 70–99)
GLUCOSE BLDC GLUCOMTR-MCNC: 104 MG/DL — HIGH (ref 70–99)
GLUCOSE BLDC GLUCOMTR-MCNC: 86 MG/DL — SIGNIFICANT CHANGE UP (ref 70–99)
GLUCOSE BLDC GLUCOMTR-MCNC: 97 MG/DL — SIGNIFICANT CHANGE UP (ref 70–99)
GLUCOSE SERPL-MCNC: 107 MG/DL — SIGNIFICANT CHANGE UP (ref 70–115)
HCT VFR BLD CALC: 33.8 % — LOW (ref 37–47)
HGB BLD-MCNC: 11.3 G/DL — LOW (ref 12–16)
LYMPHOCYTES # BLD AUTO: 1.3 K/UL — SIGNIFICANT CHANGE UP (ref 1–4.8)
LYMPHOCYTES # BLD AUTO: 24.8 % — SIGNIFICANT CHANGE UP (ref 20–55)
MAGNESIUM SERPL-MCNC: 1.7 MG/DL — SIGNIFICANT CHANGE UP (ref 1.6–2.6)
MCHC RBC-ENTMCNC: 30.8 PG — SIGNIFICANT CHANGE UP (ref 27–31)
MCHC RBC-ENTMCNC: 33.4 G/DL — SIGNIFICANT CHANGE UP (ref 32–36)
MCV RBC AUTO: 92.1 FL — SIGNIFICANT CHANGE UP (ref 81–99)
MONOCYTES # BLD AUTO: 0.3 K/UL — SIGNIFICANT CHANGE UP (ref 0–0.8)
MONOCYTES NFR BLD AUTO: 5.3 % — SIGNIFICANT CHANGE UP (ref 3–10)
NEUTROPHILS # BLD AUTO: 3.6 K/UL — SIGNIFICANT CHANGE UP (ref 1.8–8)
NEUTROPHILS NFR BLD AUTO: 68.6 % — SIGNIFICANT CHANGE UP (ref 37–73)
PHOSPHATE SERPL-MCNC: 2.7 MG/DL — SIGNIFICANT CHANGE UP (ref 2.4–4.7)
PLATELET # BLD AUTO: 200 K/UL — SIGNIFICANT CHANGE UP (ref 150–400)
POTASSIUM SERPL-MCNC: 3.5 MMOL/L — SIGNIFICANT CHANGE UP (ref 3.5–5.3)
POTASSIUM SERPL-SCNC: 3.5 MMOL/L — SIGNIFICANT CHANGE UP (ref 3.5–5.3)
RBC # BLD: 3.67 M/UL — LOW (ref 4.4–5.2)
RBC # FLD: 12.8 % — SIGNIFICANT CHANGE UP (ref 11–15.6)
SODIUM SERPL-SCNC: 133 MMOL/L — LOW (ref 135–145)
WBC # BLD: 5.3 K/UL — SIGNIFICANT CHANGE UP (ref 4.8–10.8)
WBC # FLD AUTO: 5.3 K/UL — SIGNIFICANT CHANGE UP (ref 4.8–10.8)

## 2019-04-23 PROCEDURE — 99233 SBSQ HOSP IP/OBS HIGH 50: CPT

## 2019-04-23 PROCEDURE — 74018 RADEX ABDOMEN 1 VIEW: CPT | Mod: 26

## 2019-04-23 RX ORDER — POTASSIUM CHLORIDE 20 MEQ
10 PACKET (EA) ORAL
Qty: 0 | Refills: 0 | Status: COMPLETED | OUTPATIENT
Start: 2019-04-23 | End: 2019-04-23

## 2019-04-23 RX ORDER — ENOXAPARIN SODIUM 100 MG/ML
40 INJECTION SUBCUTANEOUS DAILY
Qty: 0 | Refills: 0 | Status: DISCONTINUED | OUTPATIENT
Start: 2019-04-23 | End: 2019-04-23

## 2019-04-23 RX ORDER — MAGNESIUM SULFATE 500 MG/ML
2 VIAL (ML) INJECTION ONCE
Qty: 0 | Refills: 0 | Status: COMPLETED | OUTPATIENT
Start: 2019-04-23 | End: 2019-04-23

## 2019-04-23 RX ORDER — PANTOPRAZOLE SODIUM 20 MG/1
8 TABLET, DELAYED RELEASE ORAL
Qty: 80 | Refills: 0 | Status: DISCONTINUED | OUTPATIENT
Start: 2019-04-23 | End: 2019-04-30

## 2019-04-23 RX ADMIN — PANTOPRAZOLE SODIUM 40 MILLIGRAM(S): 20 TABLET, DELAYED RELEASE ORAL at 05:51

## 2019-04-23 RX ADMIN — Medication 100 MILLIEQUIVALENT(S): at 08:03

## 2019-04-23 RX ADMIN — Medication 50 GRAM(S): at 08:03

## 2019-04-23 RX ADMIN — PANTOPRAZOLE SODIUM 10 MG/HR: 20 TABLET, DELAYED RELEASE ORAL at 11:46

## 2019-04-23 RX ADMIN — PANTOPRAZOLE SODIUM 10 MG/HR: 20 TABLET, DELAYED RELEASE ORAL at 19:49

## 2019-04-23 RX ADMIN — Medication 100 MILLIEQUIVALENT(S): at 09:03

## 2019-04-23 RX ADMIN — SODIUM CHLORIDE 75 MILLILITER(S): 9 INJECTION, SOLUTION INTRAVENOUS at 01:31

## 2019-04-23 NOTE — PROGRESS NOTE ADULT - ASSESSMENT
89F w/ dementia, DM otherwise healthy presenting w/ gastric outlet obstruction, EGD yesterday showing necrotic tissue vs. clot and a large non-bleeding vessels which was cauterized.      npo, ivf  NGT  protonix  gtt  GI pending possible endoscopy tomorrow  Cards cleared for endoscopy  dvt ppx, scds

## 2019-04-23 NOTE — PROGRESS NOTE ADULT - SUBJECTIVE AND OBJECTIVE BOX
Chief Complaint: This is a 89y old Female patient being seen for follow-up consultation for GOO.    HPI / 24H events:  Patient underwent EGD yesterday that revealed multiple gastric ulcers, erosive esophagitis, and an obstructed pylorus.  One ulcer with a visible vessel was treated with bipolar cautery.  The obstruction was unable to be traversed.  Overnight, the patient removed her nasogastric tube.  This morning she has no complaints.    ROS: A 14-point review of systems was reviewed and was otherwise negative save what was reported in the HPI.    PAST MEDICAL/SURGICAL HISTORY:  Arthritis  Dementia    MEDICATIONS  (STANDING):  dextrose 5%. 1000 milliLiter(s) (50 mL/Hr) IV Continuous <Continuous>  dextrose 50% Injectable 12.5 Gram(s) IV Push once  dextrose 50% Injectable 25 Gram(s) IV Push once  dextrose 50% Injectable 25 Gram(s) IV Push once  insulin lispro (HumaLOG) corrective regimen sliding scale   SubCutaneous three times a day before meals  magnesium sulfate  IVPB 2 Gram(s) IV Intermittent once  multiple electrolytes Injection Type 1 1000 milliLiter(s) (75 mL/Hr) IV Continuous <Continuous>  pantoprazole  Injectable 40 milliGRAM(s) IV Push two times a day  potassium chloride  10 mEq/100 mL IVPB 10 milliEquivalent(s) IV Intermittent every 1 hour  sodium chloride 0.9%. 1000 milliLiter(s) (100 mL/Hr) IV Continuous <Continuous>    MEDICATIONS  (PRN):  dextrose 40% Gel 15 Gram(s) Oral once PRN Blood Glucose LESS THAN 70 milliGRAM(s)/deciliter  glucagon  Injectable 1 milliGRAM(s) IntraMuscular once PRN Glucose LESS THAN 70 milligrams/deciliter  ondansetron Injectable 4 milliGRAM(s) IV Push every 6 hours PRN Nausea and/or Vomiting    VITAL SIGNS LAST 24 HOURS:  T(C): 36.9 (23 Apr 2019 00:04), Max: 36.9 (23 Apr 2019 00:04)  T(F): 98.5 (23 Apr 2019 00:04), Max: 98.5 (23 Apr 2019 00:04)  HR: 82 (23 Apr 2019 00:04) (72 - 85)  BP: 96/54 (23 Apr 2019 00:04) (96/54 - 146/77)  BP(mean): --  RR: 18 (23 Apr 2019 00:04) (18 - 18)  SpO2: 96% (23 Apr 2019 00:04) (95% - 96%)      04-22-19 @ 07:01  -  04-23-19 @ 07:00  --------------------------------------------------------  IN: 900 mL / OUT: 250 mL / NET: 650 mL      PHYSICAL EXAM:  Constitutional: Well-developed, well-nourished elderly woman in no apparent distress  Eyes: Sclerae anicteric, conjunctivae normal  ENMT: Mucus membranes moist, no oropharyngeal thrush noted  Neck: No thyroid nodules appreciated, no significant cervical or supraclavicular lymphadenopathy  Respiratory: Breathing nonlabored; clear to auscultation  Cardiovascular: Regular rate and rhythm  Gastrointestinal: Soft, nontender, nondistended, tympanitic to percussion, hypoactive bowel sounds; no hepatosplenomegaly appreciated; no rebound tenderness or involuntary guarding  Extremities: No clubbing, cyanosis or edema  Neurological: Asleep, somnolent  Skin: No jaundice  Lymph Nodes: No significant lymphadenopathy  Musculoskeletal: No significant peripheral atrophy  Psychiatric: Affect and mood appropriate                            11.3   5.3   )-----------( 200      ( 23 Apr 2019 05:40 )             33.8     04-23    133<L>  |  98  |  9.0  ----------------------------<  107  3.5   |  26.0  |  0.53    Ca    8.5<L>      23 Apr 2019 05:40  Phos  2.7     04-23  Mg     1.7     04-23

## 2019-04-23 NOTE — PROGRESS NOTE ADULT - ASSESSMENT
Again, the patient's primary issue is her gastric outlet obstruction.  The etiology of this obstruction was unable to be determined endoscopically.  The differential diagnosis includes but is not limited to an obstructing duodenal ulcer and mass.  Discussed the endoscopic findings with Dr. Lloyd.  Will continue to monitor and consider possible repeat EGD in OR with surgical support later this week if no improvement with conservative measure.    Recommendations:  - Replace NG tube and place on LIWS  - Continue to monitor hemoglobin and transfuse as necessary  - Discuss GOC with family to determine how aggressive they wish to be    Will continue to follow with you.  Thank you for the consult.  Please do not hesitate to call with any questions or concerns.    JAIME Young MD  [facility] Physician [facility]  Division of Gastroenterology  Tel [phone]  Fax [phone]  04-23-19 @ 07:47

## 2019-04-23 NOTE — PROGRESS NOTE ADULT - SUBJECTIVE AND OBJECTIVE BOX
INTERVAL HPI/OVERNIGHT EVENTS:  Patient removed her NGT overnight and therefore was re-inserted this AM showing good placement on KUB. NGT placed on low intermittent wall suction. GI with plans to re-scope tomorrow 4/24 for potential biopsy if conditions and patient status permit.     MEDICATIONS  (STANDING):  dextrose 5%. 1000 milliLiter(s) (50 mL/Hr) IV Continuous <Continuous>  dextrose 50% Injectable 12.5 Gram(s) IV Push once  dextrose 50% Injectable 25 Gram(s) IV Push once  dextrose 50% Injectable 25 Gram(s) IV Push once  enoxaparin Injectable 40 milliGRAM(s) SubCutaneous daily  insulin lispro (HumaLOG) corrective regimen sliding scale   SubCutaneous three times a day before meals  multiple electrolytes Injection Type 1 1000 milliLiter(s) (75 mL/Hr) IV Continuous <Continuous>  pantoprazole Infusion 8 mG/Hr (10 mL/Hr) IV Continuous <Continuous>  sodium chloride 0.9%. 1000 milliLiter(s) (100 mL/Hr) IV Continuous <Continuous>    MEDICATIONS  (PRN):  dextrose 40% Gel 15 Gram(s) Oral once PRN Blood Glucose LESS THAN 70 milliGRAM(s)/deciliter  glucagon  Injectable 1 milliGRAM(s) IntraMuscular once PRN Glucose LESS THAN 70 milligrams/deciliter  ondansetron Injectable 4 milliGRAM(s) IV Push every 6 hours PRN Nausea and/or Vomiting      Vital Signs Last 24 Hrs  T(C): 36.3 (23 Apr 2019 07:20), Max: 36.9 (23 Apr 2019 00:04)  T(F): 97.3 (23 Apr 2019 07:20), Max: 98.5 (23 Apr 2019 00:04)  HR: 90 (23 Apr 2019 07:20) (72 - 90)  BP: 106/63 (23 Apr 2019 07:20) (96/54 - 140/76)  BP(mean): --  RR: 19 (23 Apr 2019 07:20) (18 - 19)  SpO2: 95% (23 Apr 2019 07:20) (95% - 96%)    PE  Gen: Not in acute distress  Pulm: Nonlabored breathing  CV: S1, S2  Abd: Soft, nondistended, mild epigastric tenderness, no guarding, no rebound  : No suprapubic tenderness  Ext: no pitting edema B/L  Vasc: 2+ radial and PT pulses B/L      I&O's Detail    22 Apr 2019 07:01  -  23 Apr 2019 07:00  --------------------------------------------------------  IN:    multiple electrolytes Injection Type 1: 900 mL  Total IN: 900 mL    OUT:    Nasoenteral Tube: 250 mL  Total OUT: 250 mL    Total NET: 650 mL          LABS:                        11.3   5.3   )-----------( 200      ( 23 Apr 2019 05:40 )             33.8     04-23    133<L>  |  98  |  9.0  ----------------------------<  107  3.5   |  26.0  |  0.53    Ca    8.5<L>      23 Apr 2019 05:40  Phos  2.7     04-23  Mg     1.7     04-23            RADIOLOGY & ADDITIONAL STUDIES:

## 2019-04-24 ENCOUNTER — RESULT REVIEW (OUTPATIENT)
Age: 84
End: 2019-04-24

## 2019-04-24 DIAGNOSIS — Z51.5 ENCOUNTER FOR PALLIATIVE CARE: ICD-10-CM

## 2019-04-24 LAB
ANION GAP SERPL CALC-SCNC: 17 MMOL/L — SIGNIFICANT CHANGE UP (ref 5–17)
BASOPHILS # BLD AUTO: 0 K/UL — SIGNIFICANT CHANGE UP (ref 0–0.2)
BASOPHILS NFR BLD AUTO: 0.3 % — SIGNIFICANT CHANGE UP (ref 0–2)
BLD GP AB SCN SERPL QL: SIGNIFICANT CHANGE UP
BUN SERPL-MCNC: 9 MG/DL — SIGNIFICANT CHANGE UP (ref 8–20)
CALCIUM SERPL-MCNC: 8.5 MG/DL — LOW (ref 8.6–10.2)
CHLORIDE SERPL-SCNC: 97 MMOL/L — LOW (ref 98–107)
CO2 SERPL-SCNC: 22 MMOL/L — SIGNIFICANT CHANGE UP (ref 22–29)
CREAT SERPL-MCNC: 0.53 MG/DL — SIGNIFICANT CHANGE UP (ref 0.5–1.3)
EOSINOPHIL # BLD AUTO: 0.1 K/UL — SIGNIFICANT CHANGE UP (ref 0–0.5)
EOSINOPHIL NFR BLD AUTO: 1.6 % — SIGNIFICANT CHANGE UP (ref 0–6)
GLUCOSE BLDC GLUCOMTR-MCNC: 69 MG/DL — LOW (ref 70–99)
GLUCOSE BLDC GLUCOMTR-MCNC: 69 MG/DL — LOW (ref 70–99)
GLUCOSE BLDC GLUCOMTR-MCNC: 71 MG/DL — SIGNIFICANT CHANGE UP (ref 70–99)
GLUCOSE BLDC GLUCOMTR-MCNC: 76 MG/DL — SIGNIFICANT CHANGE UP (ref 70–99)
GLUCOSE BLDC GLUCOMTR-MCNC: 80 MG/DL — SIGNIFICANT CHANGE UP (ref 70–99)
GLUCOSE SERPL-MCNC: 79 MG/DL — SIGNIFICANT CHANGE UP (ref 70–115)
HCT VFR BLD CALC: 35.8 % — LOW (ref 37–47)
HGB BLD-MCNC: 12.1 G/DL — SIGNIFICANT CHANGE UP (ref 12–16)
LYMPHOCYTES # BLD AUTO: 0.8 K/UL — LOW (ref 1–4.8)
LYMPHOCYTES # BLD AUTO: 20.5 % — SIGNIFICANT CHANGE UP (ref 20–55)
MAGNESIUM SERPL-MCNC: 1.8 MG/DL — SIGNIFICANT CHANGE UP (ref 1.6–2.6)
MCHC RBC-ENTMCNC: 30.8 PG — SIGNIFICANT CHANGE UP (ref 27–31)
MCHC RBC-ENTMCNC: 33.8 G/DL — SIGNIFICANT CHANGE UP (ref 32–36)
MCV RBC AUTO: 91.1 FL — SIGNIFICANT CHANGE UP (ref 81–99)
MONOCYTES # BLD AUTO: 0.2 K/UL — SIGNIFICANT CHANGE UP (ref 0–0.8)
MONOCYTES NFR BLD AUTO: 4.2 % — SIGNIFICANT CHANGE UP (ref 3–10)
NEUTROPHILS # BLD AUTO: 2.8 K/UL — SIGNIFICANT CHANGE UP (ref 1.8–8)
NEUTROPHILS NFR BLD AUTO: 73.1 % — HIGH (ref 37–73)
PHOSPHATE SERPL-MCNC: 2.5 MG/DL — SIGNIFICANT CHANGE UP (ref 2.4–4.7)
PLATELET # BLD AUTO: 207 K/UL — SIGNIFICANT CHANGE UP (ref 150–400)
POTASSIUM SERPL-MCNC: 3.8 MMOL/L — SIGNIFICANT CHANGE UP (ref 3.5–5.3)
POTASSIUM SERPL-SCNC: 3.8 MMOL/L — SIGNIFICANT CHANGE UP (ref 3.5–5.3)
RBC # BLD: 3.93 M/UL — LOW (ref 4.4–5.2)
RBC # FLD: 12.6 % — SIGNIFICANT CHANGE UP (ref 11–15.6)
SODIUM SERPL-SCNC: 136 MMOL/L — SIGNIFICANT CHANGE UP (ref 135–145)
TYPE + AB SCN PNL BLD: SIGNIFICANT CHANGE UP
WBC # BLD: 3.8 K/UL — LOW (ref 4.8–10.8)
WBC # FLD AUTO: 3.8 K/UL — LOW (ref 4.8–10.8)

## 2019-04-24 PROCEDURE — 43239 EGD BIOPSY SINGLE/MULTIPLE: CPT

## 2019-04-24 PROCEDURE — 74018 RADEX ABDOMEN 1 VIEW: CPT | Mod: 26

## 2019-04-24 PROCEDURE — 99223 1ST HOSP IP/OBS HIGH 75: CPT

## 2019-04-24 PROCEDURE — 88305 TISSUE EXAM BY PATHOLOGIST: CPT | Mod: 26

## 2019-04-24 PROCEDURE — 88312 SPECIAL STAINS GROUP 1: CPT | Mod: 26

## 2019-04-24 RX ORDER — POTASSIUM CHLORIDE 20 MEQ
10 PACKET (EA) ORAL
Qty: 0 | Refills: 0 | Status: COMPLETED | OUTPATIENT
Start: 2019-04-24 | End: 2019-04-24

## 2019-04-24 RX ORDER — MAGNESIUM SULFATE 500 MG/ML
2 VIAL (ML) INJECTION ONCE
Qty: 0 | Refills: 0 | Status: COMPLETED | OUTPATIENT
Start: 2019-04-24 | End: 2019-04-24

## 2019-04-24 RX ORDER — ACETAMINOPHEN 500 MG
1000 TABLET ORAL ONCE
Qty: 0 | Refills: 0 | Status: COMPLETED | OUTPATIENT
Start: 2019-04-24 | End: 2019-04-24

## 2019-04-24 RX ADMIN — Medication 50 GRAM(S): at 21:10

## 2019-04-24 RX ADMIN — PANTOPRAZOLE SODIUM 10 MG/HR: 20 TABLET, DELAYED RELEASE ORAL at 17:52

## 2019-04-24 RX ADMIN — Medication 100 MILLIEQUIVALENT(S): at 17:53

## 2019-04-24 RX ADMIN — Medication 400 MILLIGRAM(S): at 22:12

## 2019-04-24 RX ADMIN — SODIUM CHLORIDE 75 MILLILITER(S): 9 INJECTION, SOLUTION INTRAVENOUS at 01:28

## 2019-04-24 RX ADMIN — Medication 1000 MILLIGRAM(S): at 22:26

## 2019-04-24 RX ADMIN — Medication 400 MILLIGRAM(S): at 10:26

## 2019-04-24 RX ADMIN — Medication 100 MILLIEQUIVALENT(S): at 19:18

## 2019-04-24 RX ADMIN — Medication 100 MILLIEQUIVALENT(S): at 16:10

## 2019-04-24 RX ADMIN — PANTOPRAZOLE SODIUM 10 MG/HR: 20 TABLET, DELAYED RELEASE ORAL at 05:36

## 2019-04-24 NOTE — BRIEF OPERATIVE NOTE - OPERATION/FINDINGS
EGD: Empty stomach.  Duodenal bulb ulceration-involving the entire anterior wall  Scope traversed to the second portion with gentle manoeuvring  Duodenal bulb ulcer biopsies performed

## 2019-04-24 NOTE — PROGRESS NOTE ADULT - ASSESSMENT
89F w/ dementia, DM presenting w/ gastric outlet obstruction, EGD yesterday showing necrotic tissue vs. clot and a large non-bleeding vessels which was cauterized  pt had repeat EGD today w/ bx of duodenal ulcer      npo, ivf  NGT   protonix  gtt  FU bx  discussion w/ family, palliative and surgical options for further plan   dvt ppx, scds

## 2019-04-24 NOTE — PROGRESS NOTE ADULT - SUBJECTIVE AND OBJECTIVE BOX
no events overnight  s/p egd today w/ bx of duodenal ulcerated mass     MEDICATIONS  (STANDING):  dextrose 5%. 1000 milliLiter(s) (50 mL/Hr) IV Continuous <Continuous>  dextrose 50% Injectable 12.5 Gram(s) IV Push once  dextrose 50% Injectable 25 Gram(s) IV Push once  dextrose 50% Injectable 25 Gram(s) IV Push once  insulin lispro (HumaLOG) corrective regimen sliding scale   SubCutaneous three times a day before meals  magnesium sulfate  IVPB 2 Gram(s) IV Intermittent once  multiple electrolytes Injection Type 1 1000 milliLiter(s) (75 mL/Hr) IV Continuous <Continuous>  pantoprazole Infusion 8 mG/Hr (10 mL/Hr) IV Continuous <Continuous>  potassium chloride  10 mEq/100 mL IVPB 10 milliEquivalent(s) IV Intermittent every 1 hour  sodium chloride 0.9%. 1000 milliLiter(s) (100 mL/Hr) IV Continuous <Continuous>    MEDICATIONS  (PRN):  dextrose 40% Gel 15 Gram(s) Oral once PRN Blood Glucose LESS THAN 70 milliGRAM(s)/deciliter  glucagon  Injectable 1 milliGRAM(s) IntraMuscular once PRN Glucose LESS THAN 70 milligrams/deciliter  ondansetron Injectable 4 milliGRAM(s) IV Push every 6 hours PRN Nausea and/or Vomiting      Vital Signs Last 24 Hrs  T(C): 36.6 (24 Apr 2019 16:15), Max: 36.9 (23 Apr 2019 19:58)  T(F): 97.8 (24 Apr 2019 16:15), Max: 98.5 (23 Apr 2019 19:58)  HR: 72 (24 Apr 2019 16:15) (72 - 90)  BP: 148/71 (24 Apr 2019 16:15) (125/71 - 157/86)  BP(mean): --  RR: 18 (24 Apr 2019 16:15) (18 - 19)  SpO2: 100% (24 Apr 2019 16:15) (94% - 100%)    Gen: Not in acute distress, baseline dementia   Pulm: Nonlabored breathing  CV: S1, S2  Abd: Soft, nondistended, nt , no guarding, no rebound  : No suprapubic tenderness  Ext: no pitting edema B/L  Vasc: 2+ radial and PT pulses B/L    I&O's Detail    23 Apr 2019 07:01  -  24 Apr 2019 07:00  --------------------------------------------------------  IN:    multiple electrolytes Injection Type 1: 900 mL    pantoprazole Infusion: 120 mL  Total IN: 1020 mL    OUT:  Total OUT: 0 mL    Total NET: 1020 mL          LABS:                        12.1   3.8   )-----------( 207      ( 24 Apr 2019 06:37 )             35.8     04-24    136  |  97<L>  |  9.0  ----------------------------<  79  3.8   |  22.0  |  0.53    Ca    8.5<L>      24 Apr 2019 06:37  Phos  2.5     04-24  Mg     1.8     04-24            RADIOLOGY & ADDITIONAL STUDIES:

## 2019-04-24 NOTE — CONSULT NOTE ADULT - SUBJECTIVE AND OBJECTIVE BOX
HPI: Francheska Rincon is a pleasantly demented 89 year old [facility] female who resides with her daughter Roxanna  and attends [employer] day program 4 days a week, who was admitted to [facility] for vomiting and epigastric pain. Per daughter pt had been eating less and less over the past few weeks prior to admission and had lost 17 pounds. There was no noted bloody vomitus or stools per daughters report. Investigations have revealed GOO.  EGD performed could not pass scope at the level of the pylorus, gastric ulcers, esophagitis and visible vessel which was cauterized. Palliative consulted for [facility] discussion on how aggressive family would like to be in terms of invasive procedures.      PERTINENT PMH REVIEWED: Yes    PAST MEDICAL & SURGICAL HISTORY:  Arthritis  Dementia      SOCIAL HISTORY:                                     Admitted from:  home   - lives with daughter Roxanna, has 5 other children 1 who still resides in [facility], the rest are local. Pt has no history of smoking or drinking     Surrogate/HCP/Guardian: Phone#: Roxanna Lubin [phone]     FAMILY HISTORY:      Baseline ADLs (prior to admission):  Dependent  able to ambulate with assistive devices    Allergies    No Known Allergies    Intolerances        Present Symptoms:     Dyspnea: 0    Nausea/Vomiting:  No  Anxiety:  No  Depression: No  Fatigue:  No  Loss of appetite: Yes    Pain:  pt c/o of discomfort in nose where NGT inserted and in back of throat, ,denies any other complaints            Character-            Duration-            Effect-            Factors-            Frequency-            Location-            Severity-    Review of Systems: Reviewed                     Negative:                     Positive:  as above  All others negative    MEDICATIONS  (STANDING):  dextrose 5%. 1000 milliLiter(s) (50 mL/Hr) IV Continuous <Continuous>  dextrose 50% Injectable 12.5 Gram(s) IV Push once  dextrose 50% Injectable 25 Gram(s) IV Push once  dextrose 50% Injectable 25 Gram(s) IV Push once  insulin lispro (HumaLOG) corrective regimen sliding scale   SubCutaneous three times a day before meals  multiple electrolytes Injection Type 1 1000 milliLiter(s) (75 mL/Hr) IV Continuous <Continuous>  pantoprazole Infusion 8 mG/Hr (10 mL/Hr) IV Continuous <Continuous>  sodium chloride 0.9%. 1000 milliLiter(s) (100 mL/Hr) IV Continuous <Continuous>    MEDICATIONS  (PRN):  dextrose 40% Gel 15 Gram(s) Oral once PRN Blood Glucose LESS THAN 70 milliGRAM(s)/deciliter  glucagon  Injectable 1 milliGRAM(s) IntraMuscular once PRN Glucose LESS THAN 70 milligrams/deciliter  ondansetron Injectable 4 milliGRAM(s) IV Push every 6 hours PRN Nausea and/or Vomiting      PHYSICAL EXAM:    Vital Signs Last 24 Hrs  T(C): 36.5 (24 Apr 2019 07:40), Max: 36.9 (23 Apr 2019 19:58)  T(F): 97.7 (24 Apr 2019 07:40), Max: 98.5 (23 Apr 2019 19:58)  HR: 90 (24 Apr 2019 07:40) (75 - 90)  BP: 152/52 (24 Apr 2019 07:40) (141/76 - 157/86)  BP(mean): --  RR: 18 (24 Apr 2019 07:40) (18 - 19)  SpO2: 94% (23 Apr 2019 23:43) (94% - 96%)    General: alert  oriented x _1-2___       Karnofsky:  %    HEENT:   dry mouth      Lungs: comfortable    CV: normal     GI:  distended NG tube  constipation  last BM: 4/18??    :  incontinent      MSK:             ambulatory  (prior to admission)    Skin: normal      LABS:                        12.1   3.8   )-----------( 207      ( 24 Apr 2019 06:37 )             35.8     04-24    136  |  97<L>  |  9.0  ----------------------------<  79  3.8   |  22.0  |  0.53    Ca    8.5<L>      24 Apr 2019 06:37  Phos  2.5     04-24  Mg     1.8     04-24          I&O's Summary    23 Apr 2019 07:01  -  24 Apr 2019 07:00  --------------------------------------------------------  IN: 1020 mL / OUT: 0 mL / NET: 1020 mL        RADIOLOGY & ADDITIONAL STUDIES:  < from: Xray Kidney Ureter Bladder (04.24.19 @ 09:14) >  Findings:    There is an NG tube in place with tip in the stomach. There is a   nonspecific bowel gas pattern with minimally dilated loops of air-filled   small bowel. Contrast material is seen within the ascending and proximal   transverse colon.    Impression:    NG tube in place with tip in the stomach. Nonspecific bowel gas pattern.                PALMER SANCHEZ M.D., ATTENDING RADIOLOGIST  This document has been electronically signed. Apr 24 2019 10:47AM        < end of copied text >  < from: CT Abdomen and Pelvis w/ Oral Cont and w/ IV Cont (04.20.19 @ 16:11) >    INTERPRETATION:  Clinical history: Vomiting and abdominal pain    CT the abdomen and pelvis is obtained with oral and IV contrast. Study is   limited by patient positioning.    Stomach is markedly distended to the region of the pylorus. There is   fluid adjacent to the distal stomach. This may be secondary to an   inflammatory process that could be resulting in a gastric outlet   obstruction. The visualized small bowel is normal caliber. Multiple   gallstones are seen. The liver, spleen and kidneys appear unremarkable.   There is atrophy of the pancreas.    Within the pelvis no free fluid is seen. Bowel loops are normal in   caliber. A normal appendix is visualized in the right lower quadrant.    Impression: Markedly distended stomach which may be secondary to an   inflammatory process resulting in gastric outlet obstruction in the   region of the pylorus. Multiple gallstones are seen. See above for   additional details.                EMERITA WALLER   This document has been electronically signed. Apr 20 2019  4:20PM        < end of copied text >    ADVANCE DIRECTIVES:   DNR NO  Completed on:                     MOLST  NO   Completed on:  Living Will   NO   Completed on:
CARDIOLOGY CONSULTATION NOTE ([facility])  Consult requested by:  Dr. Lloyd    Reason for Consultation: Preoperative risk stratification prior to endoscopy    History obtained by: family and medical record     obtained: No    Chief complaint:    Patient is a 89y old  Female who presents with a chief complaint of Gastric outlet obstruction (21 Apr 2019 12:15)      HPI:  89F w/ hx of dementia, DM presenting to ER w/ emesis, decreased po intake and epigastric pain. Pts family states that for past several weeks pt has had decreased PO intake, prior to this had normal appetite and was eating well. 2 weeks ago she had an episode of bilious emesis and again this morning she had several episodes of emesis. Family denies changes in BMs, melena or hematochezia.     Cardiology consulted prior to endoscopy.  patient has dementia and history was obtained drom daughter  no prior MI, valvular heart disease or heart failure  very sedentary , ambulates in house mostly to bathroom  Family denies that she ever complained of chest pain, SOB, palpitations, dizziness or syncope        REVIEW OF SYMPTOMS: unobtainable due to dementia  ALLERGIES: Allergies    No Known Allergies    Intolerances        MEDICATIONS  (STANDING):  dextrose 5%. 1000 milliLiter(s) (50 mL/Hr) IV Continuous <Continuous>  dextrose 50% Injectable 12.5 Gram(s) IV Push once  dextrose 50% Injectable 25 Gram(s) IV Push once  dextrose 50% Injectable 25 Gram(s) IV Push once  insulin lispro (HumaLOG) corrective regimen sliding scale   SubCutaneous three times a day before meals  magnesium sulfate  IVPB 1 Gram(s) IV Intermittent once  pantoprazole  Injectable 40 milliGRAM(s) IV Push two times a day  potassium phosphate IVPB 15 milliMole(s) IV Intermittent once  sodium chloride 0.9%. 1000 milliLiter(s) (100 mL/Hr) IV Continuous <Continuous>  sodium chloride 0.9%. 1000 milliLiter(s) (150 mL/Hr) IV Continuous <Continuous>      PAST MEDICAL HISTORY  Arthritis  Dementia      PAST SURGICAL HISTORY  difficult to obtain    FH: unobtainable    SOCIAL HISTORY:  Denies smoking/alcohol/drugs      Vital Signs Last 24 Hrs  T(C): 36.6 (21 Apr 2019 16:23), Max: 36.9 (21 Apr 2019 02:42)  T(F): 97.9 (21 Apr 2019 16:23), Max: 98.4 (21 Apr 2019 02:42)  HR: 80 (21 Apr 2019 16:23) (80 - 92)  BP: 130/71 (21 Apr 2019 16:23) (112/69 - 149/72)  BP(mean): --  RR: 18 (21 Apr 2019 16:23) (16 - 18)  SpO2: 96% (21 Apr 2019 16:23) (95% - 99%)      PHYSICAL EXAM:  Constitutional: elderly female with NG tube, interacts minimially  HEENT: Atraumatic and normcephalic , neck is supple . no JVD. No carotid bruit.   CNS: A&Ox3. No focal deficits.  Lymph Nodes: Cervical : Not palpable.  Respiratory: CTAB anteriorly  Cardiovascular: S1S2 RRR. No murmur/rubs or gallop.  Gastrointestinal: Soft non-tender and non distended . +Bowel sounds, no HSM  Extremities: No edema ,   Psychiatric: calm,      Intake and output:   04-20 @ 07:01  -  04-21 @ 07:00  --------------------------------------------------------  IN: 500 mL / OUT: 700 mL / NET: -200 mL    04-21 @ 07:01 - 04-21 @ 16:52  --------------------------------------------------------  IN: 300 mL / OUT: 100 mL / NET: 200 mL        LABS:                        12.9   4.0   )-----------( 236      ( 21 Apr 2019 06:56 )             38.5     04-21    140  |  96<L>  |  15.0  ----------------------------<  95  3.8   |  32.0<H>  |  0.68    Ca    9.1      21 Apr 2019 06:56  Phos  3.3     04-21  Mg     1.6     04-21    TPro  7.7  /  Alb  3.9  /  TBili  0.3<L>  /  DBili  x   /  AST  20  /  ALT  11  /  AlkPhos  70  04-20    CARDIAC MARKERS ( 20 Apr 2019 13:20 )  x     / x     / 72 U/L / x     / x        ;p-BNP=  PT/INR - ( 20 Apr 2019 13:20 )   PT: 11.6 sec;   INR: 1.01 ratio         PTT - ( 20 Apr 2019 13:20 )  PTT:32.3 sec      INTERPRETATION OF TELEMETRY:  ECG: not done this admission, 5/2016 NSR, left axis    RADIOLOGY & ADDITIONAL STUDIES:    X-ray:  no pulmonary congestion, rotated, no cardiomegaly    ECHO FINDINGS:   < from: TTE Echo Complete w/Doppler (04.21.19 @ 09:35) >   1. Technically suboptimal study.   2. Normal left ventricular internal cavity size.   3. Left ventricular ejection fraction, by visual estimation, is 50 to   55%.   4. Spectral Doppler shows impaired relaxation pattern of left   ventricular myocardial filling (Grade I diastolic dysfunction).   5. Peak transaortic gradient equals 6.1 mmHg, mean transaortic gradient   equals 3.3 mmHg, the calculated aortic valve area equals 1.98 cm² by the   continuity equation consistent with mild aortic stenosis.   6. Mild mitral annular calcification.   7. Thickening and calcification of the anterior and posterior mitral   valve leaflets.   8. Moderately decreased posterior mitral leaflet mobility.    < end of copied text >      STRESS  FINDINGS:     Catheterization Findings
HPI:89-year-old  woman who was admitted for decreased appetite, dark colored vomiting. As per the daughter, she has not been eating right. About 3 weeks ago she had dark-colored vomiting. Then yesterday she had multiple episodes of vomiting. There is no change in bowel habits, melena, hematochezia. She never had a EGD or colonoscopy.She has history of dementia and most of the history was taken from the daughter. She had an NG tube placed and dark brown contents were aspirated. She did not have a bowel movement for 3 days as per the daughter.    PAST MEDICAL & SURGICAL HISTORY:  Arthritis  Dementia  DM    ROS:  Could not be obtained     MEDICATIONS  (STANDING):  dextrose 5%. 1000 milliLiter(s) (50 mL/Hr) IV Continuous <Continuous>  dextrose 50% Injectable 12.5 Gram(s) IV Push once  dextrose 50% Injectable 25 Gram(s) IV Push once  dextrose 50% Injectable 25 Gram(s) IV Push once  enoxaparin Injectable 40 milliGRAM(s) SubCutaneous daily  insulin lispro (HumaLOG) corrective regimen sliding scale   SubCutaneous three times a day before meals  pantoprazole  Injectable 40 milliGRAM(s) IV Push two times a day  sodium chloride 0.9%. 1000 milliLiter(s) (100 mL/Hr) IV Continuous <Continuous>  sodium chloride 0.9%. 1000 milliLiter(s) (150 mL/Hr) IV Continuous <Continuous>    MEDICATIONS  (PRN):  dextrose 40% Gel 15 Gram(s) Oral once PRN Blood Glucose LESS THAN 70 milliGRAM(s)/deciliter  glucagon  Injectable 1 milliGRAM(s) IntraMuscular once PRN Glucose LESS THAN 70 milligrams/deciliter  ondansetron Injectable 4 milliGRAM(s) IV Push every 6 hours PRN Nausea and/or Vomiting      Allergies    No Known Allergies    Intolerances        SOCIAL HISTORY:No alcohol,smoking or drug use    ENDOSCOPIC/GI HISTORY:No EGD or colonoscopy    FAMILY HISTORY:Noncontributory      Vital Signs Last 24 Hrs  T(C): 36.7 (21 Apr 2019 08:09), Max: 36.9 (20 Apr 2019 12:27)  T(F): 98 (21 Apr 2019 08:09), Max: 98.5 (20 Apr 2019 12:27)  HR: 84 (21 Apr 2019 02:42) (84 - 92)  BP: 129/76 (21 Apr 2019 08:09) (112/69 - 149/72)  BP(mean): --  RR: 18 (21 Apr 2019 08:09) (16 - 18)  SpO2: 98% (21 Apr 2019 08:09) (95% - 99%)    PHYSICAL EXAM:    GENERAL: Demented, Not giving any history. NG tube in place  HEAD:  Atraumatic, Normocephalic  EYES: EOMI, PERRLA, conjunctiva and sclera clear  ENMT: No tonsillar erythema, exudates, or enlargement; Moist mucous membranes, Good dentition, NG tube in place  NECK: Supple, No JVD, Normal thyroid  CHEST/LUNG: Clear to percussion bilaterally; No rales, rhonchi, wheezing, or rubs  HEART: Regular rate and rhythm; No murmurs, rubs, or gallops  ABDOMEN: Soft, Nontender, mildly distended; Bowel sounds present  RECTAL: Trace brown stools.  EXTREMITIES:  2+ Peripheral Pulses, No clubbing, cyanosis, or edema  LYMPH: No lymphadenopathy noted  SKIN: No rashes or lesions      LABS:                        12.9   4.0   )-----------( 236      ( 21 Apr 2019 06:56 )             38.5     04-21    140  |  96<L>  |  15.0  ----------------------------<  95  3.8   |  32.0<H>  |  0.68    Ca    9.1      21 Apr 2019 06:56  Phos  3.3     04-21  Mg     1.6     04-21    TPro  7.7  /  Alb  3.9  /  TBili  0.3<L>  /  DBili  x   /  AST  20  /  ALT  11  /  AlkPhos  70  04-20    PT/INR - ( 20 Apr 2019 13:20 )   PT: 11.6 sec;   INR: 1.01 ratio         PTT - ( 20 Apr 2019 13:20 )  PTT:32.3 sec       LIVER FUNCTIONS - ( 20 Apr 2019 13:20 )  Alb: 3.9 g/dL / Pro: 7.7 g/dL / ALK PHOS: 70 U/L / ALT: 11 U/L / AST: 20 U/L / GGT: x               RADIOLOGY & ADDITIONAL STUDIES:  < from: CT Abdomen and Pelvis w/ Oral Cont and w/ IV Cont (04.20.19 @ 16:11) >     EXAM:  CT ABDOMEN AND PELVIS OC IC                          PROCEDURE DATE:  04/20/2019          INTERPRETATION:  Clinical history: Vomiting and abdominal pain    CT the abdomen and pelvis is obtained with oral and IV contrast. Study is   limited by patient positioning.    Stomach is markedly distended to the region of the pylorus. There is   fluid adjacent to the distal stomach. This may be secondary to an   inflammatory process that could be resulting in a gastric outlet   obstruction. The visualized small bowel is normal caliber. Multiple   gallstones are seen. The liver, spleen and kidneys appear unremarkable.   There is atrophy of the pancreas.    Within the pelvis no free fluid is seen. Bowel loops are normal in   caliber. A normal appendix is visualized in the right lower quadrant.    Impression: Markedly distended stomach which may be secondary to an   inflammatory process resulting in gastric outlet obstruction in the   region of the pylorus. Multiple gallstones are seen. See above for   additional details.                EMERITA WALLER   This document has been electronically signed. Apr 20 2019  4:20PM                  < end of copied text >

## 2019-04-24 NOTE — CONSULT NOTE ADULT - PROBLEM SELECTOR RECOMMENDATION 2
pt goes to day program 4 days a week and has some quality of life prior to a few weeks ago pt had a good appetite and was eating well, lives with daughter but is forgetful of her children who do not reside with her.  continue supportive measures, reorientation

## 2019-04-24 NOTE — CONSULT NOTE ADULT - ASSESSMENT
89 y/o F with dementia, DMII admitted with O
89 y/o female with dementia, admitted with GI symptoms and likely has gastric outlet obstruction   DM on insulin   unable to evaluate functional capacity. EF preserved, no significant valvular disease   no prior cardiac history   no cardiac contraindications to procedure  Low risk for perioperative cardiac events going for low risk procedure like endoscopy  EKG not done, need ekg before procedure  Risks of the endoscopy and conscious sedation will be discussed with patient and family by GI and anaesthesia.
Patient with a history of diabetes, nausea, vomiting, and dark-colored stomach contents, and gastric outlet obstruction. No history of prior EGD. No history of significant weight loss as per the daughter. No history of melena. At this time, I would recommend to continue NG tube.  Plan continue PPI b.i.d. Possible cardiology evaluation for upper endoscopy hopefully Tomorrow or Tuesday. Repeat abdominal x-ray.

## 2019-04-24 NOTE — CONSULT NOTE ADULT - PROBLEM SELECTOR RECOMMENDATION 9
plan for repeat EGD today  if obstruction cannot be relieved or possible stent placed will need to discuss other surgical options with pt's daughter

## 2019-04-24 NOTE — CONSULT NOTE ADULT - ATTENDING COMMENTS
case discussed at length with Dr. Perdomo/surgical team, thank you for the courtesy of this consult with continue to follow with you.

## 2019-04-24 NOTE — PROGRESS NOTE ADULT - SUBJECTIVE AND OBJECTIVE BOX
INTERVAL HPI/OVERNIGHT EVENTS:FU for gastric outlet obstruction. s/p EGD on monday. Pylorus could not be entered.     MEDICATIONS  (STANDING):  dextrose 5%. 1000 milliLiter(s) (50 mL/Hr) IV Continuous <Continuous>  dextrose 50% Injectable 12.5 Gram(s) IV Push once  dextrose 50% Injectable 25 Gram(s) IV Push once  dextrose 50% Injectable 25 Gram(s) IV Push once  insulin lispro (HumaLOG) corrective regimen sliding scale   SubCutaneous three times a day before meals  multiple electrolytes Injection Type 1 1000 milliLiter(s) (75 mL/Hr) IV Continuous <Continuous>  pantoprazole Infusion 8 mG/Hr (10 mL/Hr) IV Continuous <Continuous>  sodium chloride 0.9%. 1000 milliLiter(s) (100 mL/Hr) IV Continuous <Continuous>    MEDICATIONS  (PRN):  dextrose 40% Gel 15 Gram(s) Oral once PRN Blood Glucose LESS THAN 70 milliGRAM(s)/deciliter  glucagon  Injectable 1 milliGRAM(s) IntraMuscular once PRN Glucose LESS THAN 70 milligrams/deciliter  ondansetron Injectable 4 milliGRAM(s) IV Push every 6 hours PRN Nausea and/or Vomiting      Allergies    No Known Allergies    Intolerances        Vital Signs Last 24 Hrs  T(C): 36.4 (23 Apr 2019 23:43), Max: 36.9 (23 Apr 2019 19:58)  T(F): 97.5 (23 Apr 2019 23:43), Max: 98.5 (23 Apr 2019 19:58)  HR: 82 (23 Apr 2019 23:43) (75 - 90)  BP: 142/83 (23 Apr 2019 23:43) (106/63 - 157/86)  BP(mean): --  RR: 19 (23 Apr 2019 23:43) (18 - 19)  SpO2: 94% (23 Apr 2019 23:43) (94% - 96%)    LABS:                        12.1   3.8   )-----------( 207      ( 24 Apr 2019 06:37 )             35.8     04-23    133<L>  |  98  |  9.0  ----------------------------<  107  3.5   |  26.0  |  0.53    Ca    8.5<L>      23 Apr 2019 05:40  Phos  2.7     04-23  Mg     1.7     04-23            RADIOLOGY & ADDITIONAL TESTS:

## 2019-04-25 LAB
ALBUMIN SERPL ELPH-MCNC: 2.9 G/DL — LOW (ref 3.3–5.2)
ALP SERPL-CCNC: 55 U/L — SIGNIFICANT CHANGE UP (ref 40–120)
ALT FLD-CCNC: 18 U/L — SIGNIFICANT CHANGE UP
ANION GAP SERPL CALC-SCNC: 16 MMOL/L — SIGNIFICANT CHANGE UP (ref 5–17)
AST SERPL-CCNC: 28 U/L — SIGNIFICANT CHANGE UP
BILIRUB DIRECT SERPL-MCNC: 0.1 MG/DL — SIGNIFICANT CHANGE UP (ref 0–0.3)
BILIRUB INDIRECT FLD-MCNC: 0.2 MG/DL — SIGNIFICANT CHANGE UP (ref 0.2–1)
BILIRUB SERPL-MCNC: 0.3 MG/DL — LOW (ref 0.4–2)
BUN SERPL-MCNC: 7 MG/DL — LOW (ref 8–20)
CALCIUM SERPL-MCNC: 8.8 MG/DL — SIGNIFICANT CHANGE UP (ref 8.6–10.2)
CHLORIDE SERPL-SCNC: 98 MMOL/L — SIGNIFICANT CHANGE UP (ref 98–107)
CO2 SERPL-SCNC: 20 MMOL/L — LOW (ref 22–29)
CREAT SERPL-MCNC: 0.51 MG/DL — SIGNIFICANT CHANGE UP (ref 0.5–1.3)
CRP SERPL-MCNC: 6.6 MG/DL — HIGH (ref 0–0.4)
GLUCOSE BLDC GLUCOMTR-MCNC: 146 MG/DL — HIGH (ref 70–99)
GLUCOSE BLDC GLUCOMTR-MCNC: 158 MG/DL — HIGH (ref 70–99)
GLUCOSE BLDC GLUCOMTR-MCNC: 76 MG/DL — SIGNIFICANT CHANGE UP (ref 70–99)
GLUCOSE BLDC GLUCOMTR-MCNC: 98 MG/DL — SIGNIFICANT CHANGE UP (ref 70–99)
GLUCOSE SERPL-MCNC: 106 MG/DL — SIGNIFICANT CHANGE UP (ref 70–115)
HCT VFR BLD CALC: 36.2 % — LOW (ref 37–47)
HGB BLD-MCNC: 12.3 G/DL — SIGNIFICANT CHANGE UP (ref 12–16)
MAGNESIUM SERPL-MCNC: 2 MG/DL — SIGNIFICANT CHANGE UP (ref 1.6–2.6)
MCHC RBC-ENTMCNC: 31.1 PG — HIGH (ref 27–31)
MCHC RBC-ENTMCNC: 34 G/DL — SIGNIFICANT CHANGE UP (ref 32–36)
MCV RBC AUTO: 91.4 FL — SIGNIFICANT CHANGE UP (ref 81–99)
PHOSPHATE SERPL-MCNC: 3 MG/DL — SIGNIFICANT CHANGE UP (ref 2.4–4.7)
PLATELET # BLD AUTO: 205 K/UL — SIGNIFICANT CHANGE UP (ref 150–400)
POTASSIUM SERPL-MCNC: 4.4 MMOL/L — SIGNIFICANT CHANGE UP (ref 3.5–5.3)
POTASSIUM SERPL-SCNC: 4.4 MMOL/L — SIGNIFICANT CHANGE UP (ref 3.5–5.3)
PREALB SERPL-MCNC: 8 MG/DL — LOW (ref 18–38)
PROT SERPL-MCNC: 6.2 G/DL — LOW (ref 6.6–8.7)
RBC # BLD: 3.96 M/UL — LOW (ref 4.4–5.2)
RBC # FLD: 12.8 % — SIGNIFICANT CHANGE UP (ref 11–15.6)
SODIUM SERPL-SCNC: 134 MMOL/L — LOW (ref 135–145)
WBC # BLD: 2.9 K/UL — LOW (ref 4.8–10.8)
WBC # FLD AUTO: 2.9 K/UL — LOW (ref 4.8–10.8)

## 2019-04-25 PROCEDURE — 99497 ADVNCD CARE PLAN 30 MIN: CPT

## 2019-04-25 PROCEDURE — 99233 SBSQ HOSP IP/OBS HIGH 50: CPT | Mod: 25

## 2019-04-25 PROCEDURE — 99233 SBSQ HOSP IP/OBS HIGH 50: CPT

## 2019-04-25 RX ORDER — SODIUM CHLORIDE 9 MG/ML
1000 INJECTION, SOLUTION INTRAVENOUS
Qty: 0 | Refills: 0 | Status: DISCONTINUED | OUTPATIENT
Start: 2019-04-25 | End: 2019-04-25

## 2019-04-25 RX ORDER — SODIUM CHLORIDE 9 MG/ML
1000 INJECTION INTRAMUSCULAR; INTRAVENOUS; SUBCUTANEOUS
Qty: 0 | Refills: 0 | Status: DISCONTINUED | OUTPATIENT
Start: 2019-04-25 | End: 2019-04-30

## 2019-04-25 RX ADMIN — PANTOPRAZOLE SODIUM 10 MG/HR: 20 TABLET, DELAYED RELEASE ORAL at 02:25

## 2019-04-25 RX ADMIN — SODIUM CHLORIDE 50 MILLILITER(S): 9 INJECTION, SOLUTION INTRAVENOUS at 00:06

## 2019-04-25 RX ADMIN — SODIUM CHLORIDE 75 MILLILITER(S): 9 INJECTION, SOLUTION INTRAVENOUS at 12:04

## 2019-04-25 RX ADMIN — PANTOPRAZOLE SODIUM 10 MG/HR: 20 TABLET, DELAYED RELEASE ORAL at 13:38

## 2019-04-25 RX ADMIN — Medication 2: at 18:48

## 2019-04-25 NOTE — PROGRESS NOTE ADULT - SUBJECTIVE AND OBJECTIVE BOX
INTERVAL HPI/OVERNIGHT EVENTS:FU for gastric outlet obstruction.EGD yesterday.  Necrotic duodenal bulb ulcer. Scope passed into 2nd portion. Patient pulled NG tube out. Pulled IV line. On PPI infusion    MEDICATIONS  (STANDING):  dextrose 5% + sodium chloride 0.9%. 1000 milliLiter(s) (75 mL/Hr) IV Continuous <Continuous>  dextrose 5%. 1000 milliLiter(s) (50 mL/Hr) IV Continuous <Continuous>  dextrose 50% Injectable 12.5 Gram(s) IV Push once  dextrose 50% Injectable 25 Gram(s) IV Push once  dextrose 50% Injectable 25 Gram(s) IV Push once  insulin lispro (HumaLOG) corrective regimen sliding scale   SubCutaneous three times a day before meals  pantoprazole Infusion 8 mG/Hr (10 mL/Hr) IV Continuous <Continuous>    MEDICATIONS  (PRN):  dextrose 40% Gel 15 Gram(s) Oral once PRN Blood Glucose LESS THAN 70 milliGRAM(s)/deciliter  glucagon  Injectable 1 milliGRAM(s) IntraMuscular once PRN Glucose LESS THAN 70 milligrams/deciliter  ondansetron Injectable 4 milliGRAM(s) IV Push every 6 hours PRN Nausea and/or Vomiting      Allergies    No Known Allergies    Intolerances        Vital Signs Last 24 Hrs  T(C): 36.6 (25 Apr 2019 04:22), Max: 36.6 (24 Apr 2019 16:15)  T(F): 97.8 (25 Apr 2019 04:22), Max: 97.9 (24 Apr 2019 23:12)  HR: 80 (25 Apr 2019 04:22) (72 - 81)  BP: 122/70 (25 Apr 2019 04:22) (122/70 - 163/82)  BP(mean): --  RR: 18 (25 Apr 2019 04:22) (18 - 18)  SpO2: 96% (25 Apr 2019 04:22) (96% - 100%)    LABS:                        12.3   2.9   )-----------( 205      ( 25 Apr 2019 05:53 )             36.2     04-25    134<L>  |  98  |  7.0<L>  ----------------------------<  106  4.4   |  20.0<L>  |  0.51    Ca    8.8      25 Apr 2019 05:53  Phos  3.0     04-25  Mg     2.0     04-25            RADIOLOGY & ADDITIONAL TESTS:

## 2019-04-25 NOTE — DIETITIAN INITIAL EVALUATION ADULT. - PHYSICAL APPEARANCE
debilitated/BMI: 21.1; NFPE present; MODERATE muscle loss in clavicle and shoulder; MILD fat loss in orbital and temples.

## 2019-04-25 NOTE — PROGRESS NOTE ADULT - SUBJECTIVE AND OBJECTIVE BOX
Chief Complaint: This is a 89y old Female patient being seen for follow-up consultation for GOO.    HPI / 24H events:  Patient underwent repeat EGD yesterday.  Biopsies taken from the ulcer.  Patient pulled out NGT and IVs overnight.  Resting comfortably this morning.    ROS: A 14-point review of systems was reviewed and was otherwise negative save what was reported in the HPI.    PAST MEDICAL/SURGICAL HISTORY:  Arthritis  Dementia    MEDICATIONS  (STANDING):  dextrose 5% + sodium chloride 0.9%. 1000 milliLiter(s) (75 mL/Hr) IV Continuous <Continuous>  dextrose 5%. 1000 milliLiter(s) (50 mL/Hr) IV Continuous <Continuous>  dextrose 50% Injectable 12.5 Gram(s) IV Push once  dextrose 50% Injectable 25 Gram(s) IV Push once  dextrose 50% Injectable 25 Gram(s) IV Push once  insulin lispro (HumaLOG) corrective regimen sliding scale   SubCutaneous three times a day before meals  pantoprazole Infusion 8 mG/Hr (10 mL/Hr) IV Continuous <Continuous>    MEDICATIONS  (PRN):  dextrose 40% Gel 15 Gram(s) Oral once PRN Blood Glucose LESS THAN 70 milliGRAM(s)/deciliter  glucagon  Injectable 1 milliGRAM(s) IntraMuscular once PRN Glucose LESS THAN 70 milligrams/deciliter  ondansetron Injectable 4 milliGRAM(s) IV Push every 6 hours PRN Nausea and/or Vomiting    VITAL SIGNS LAST 24 HOURS:  T(C): 36.6 (25 Apr 2019 04:22), Max: 36.6 (24 Apr 2019 16:15)  T(F): 97.8 (25 Apr 2019 04:22), Max: 97.9 (24 Apr 2019 23:12)  HR: 80 (25 Apr 2019 04:22) (72 - 81)  BP: 122/70 (25 Apr 2019 04:22) (122/70 - 163/82)  BP(mean): --  RR: 18 (25 Apr 2019 04:22) (18 - 18)  SpO2: 96% (25 Apr 2019 04:22) (96% - 100%)      04-24-19 @ 07:01  -  04-25-19 @ 07:00  --------------------------------------------------------  IN: 0 mL / OUT: 200 mL / NET: -200 mL      PHYSICAL EXAM:  Constitutional: Well-developed, well-nourished, in no apparent distress  Eyes: Sclerae anicteric, conjunctivae normal  ENMT: Mucus membranes moist, no oropharyngeal thrush noted  Neck: No thyroid nodules appreciated, no significant cervical or supraclavicular lymphadenopathy  Respiratory: Breathing nonlabored; clear to auscultation  Cardiovascular: Regular rate and rhythm  Gastrointestinal: Soft, nontender, nondistended, hypoactive bowel sounds; tympanitic to percussion, no hepatosplenomegaly appreciated; no rebound tenderness or involuntary guarding  Extremities: No clubbing, cyanosis or edema  Neurological: No asterixis  Skin: No jaundice  Lymph Nodes: No significant lymphadenopathy  Musculoskeletal: No significant peripheral atrophy                          12.3   2.9   )-----------( 205      ( 25 Apr 2019 05:53 )             36.2     04-25    134<L>  |  98  |  7.0<L>  ----------------------------<  106  4.4   |  20.0<L>  |  0.51    Ca    8.8      25 Apr 2019 05:53  Phos  3.0     04-25  Mg     2.0     04-25

## 2019-04-25 NOTE — DIETITIAN INITIAL EVALUATION ADULT. - ETIOLOGY
related to inadequate protein energy intake in the setting of gastric ulcers, dilatation of stomach, gastric outlet obstruction

## 2019-04-25 NOTE — DIETITIAN INITIAL EVALUATION ADULT. - OTHER INFO
89F w/ hx of dementia, DM presented to ER w/ emesis, decreased po intake and EG pain. At bedside, daughter states that for past 3-4 weeks pt has had decreased PO intake; she would have a few bites and be full. Daughter started supplementing diet with 2-3 Ensures/day. Prior to that pt had normal appetite and was eating well. Daughter reports pt had been having emesis PTA with the last episode being the day of admission (4/20). Daughter also reports a 17 lb (11.8%) unintentional weight loss since Nov. Pt has been NPO since admit. NGT placed and removing dark colored stomach contents. Pt with gastric outlet obstruction and multiple ulcers. 89F w/ hx of dementia, DM presented to ER w/ emesis, decreased po intake and EG pain. At bedside, daughter states that for past 3-4 weeks pt has had decreased PO intake; she would have a few bites and be full. Daughter started supplementing diet with 2-3 Ensures/day. Prior to that pt had normal appetite and was eating well. Daughter reports pt had been having emesis PTA with the last episode being the day of admission (4/20). Daughter also reports a 17 lb (11.8%) unintentional weight loss since Nov. Pt has been NPO since admit. NGT suction removing dark colored stomach contents. Pt with gastric outlet obstruction and multiple ulcers. Plan of care discussion noted.

## 2019-04-25 NOTE — DIETITIAN INITIAL EVALUATION ADULT. - PERTINENT LABORATORY DATA
04-25 Na134 mmol/L<L> Glu 106 mg/dL K+ 4.4 mmol/L Cr  0.51 mg/dL BUN 7.0 mg/dL<L> Phos 3.0 mg/dL Alb 2.9 g/dL<L> PAB 8 mg/dL<L>

## 2019-04-25 NOTE — PROGRESS NOTE ADULT - ASSESSMENT
Patient with gastric outlet obstruction. Large necrotic duodenal bulb ulcer. Biopsies performed. Further management as per surgical team.   Call GI as needed

## 2019-04-25 NOTE — PROGRESS NOTE ADULT - SUBJECTIVE AND OBJECTIVE BOX
OVERNIGHT EVENTS: pulling out IVs and NGT    BRIEF HOSPITAL COURSE: 89 year old [place] female who resides with her daughter Roxanna  and attends [employer] program 4 days a week, who was admitted to [facility] for vomiting and epigastric pain. Per daughter pt had been eating less and less over the past few weeks prior to admission and had lost 17 pounds. There was no noted bloody vomitus or stools per daughters report. Investigations have revealed GOO. Pt had 2 EGDs, scope was passed further yesterday, obstructing duodenal ulcer and biopsies taken. Pt had NGt replaced and has pulled it several times now.     Present Symptoms: pt cannot answer ROS/symptom questions- she appears comfortable today sitting in chair by window    Dyspnea: 0 1 2 3   Nausea/Vomiting: Yes No  Anxiety:  Yes No  Depression: Yes No  Fatigue: Yes No  Loss of appetite: Yes No    Pain:             Character-            Duration-            Effect-            Factors-            Frequency-            Location-            Severity-    Review of Systems: Reviewed                   Unable to obtain due to poor mentation       MEDICATIONS  (STANDING):  dextrose 5% + sodium chloride 0.9%. 1000 milliLiter(s) (75 mL/Hr) IV Continuous <Continuous>  dextrose 5%. 1000 milliLiter(s) (50 mL/Hr) IV Continuous <Continuous>  dextrose 50% Injectable 12.5 Gram(s) IV Push once  dextrose 50% Injectable 25 Gram(s) IV Push once  dextrose 50% Injectable 25 Gram(s) IV Push once  insulin lispro (HumaLOG) corrective regimen sliding scale   SubCutaneous three times a day before meals  pantoprazole Infusion 8 mG/Hr (10 mL/Hr) IV Continuous <Continuous>    MEDICATIONS  (PRN):  dextrose 40% Gel 15 Gram(s) Oral once PRN Blood Glucose LESS THAN 70 milliGRAM(s)/deciliter  glucagon  Injectable 1 milliGRAM(s) IntraMuscular once PRN Glucose LESS THAN 70 milligrams/deciliter  ondansetron Injectable 4 milliGRAM(s) IV Push every 6 hours PRN Nausea and/or Vomiting      PHYSICAL EXAM:    Vital Signs Last 24 Hrs  T(C): 36.7 (25 Apr 2019 07:30), Max: 36.7 (25 Apr 2019 07:30)  T(F): 98 (25 Apr 2019 07:30), Max: 98 (25 Apr 2019 07:30)  HR: 62 (25 Apr 2019 07:30) (62 - 80)  BP: 139/70 (25 Apr 2019 07:30) (122/70 - 163/82)  BP(mean): --  RR: 17 (25 Apr 2019 07:30) (17 - 18)  SpO2: 95% (25 Apr 2019 07:30) (95% - 100%)    General: alert  oriented x 1-2_     Karnofsky: 40 %    HEENT: normal      Lungs: comfortable     CV: normal      GI: normal  distended   constipation      : incontinent      MSK: normal              ambulatory     Skin: normal      LABS:                          12.3   2.9   )-----------( 205      ( 25 Apr 2019 05:53 )             36.2     04-25    134<L>  |  98  |  7.0<L>  ----------------------------<  106  4.4   |  20.0<L>  |  0.51    Ca    8.8      25 Apr 2019 05:53  Phos  3.0     04-25  Mg     2.0     04-25    TPro  6.2<L>  /  Alb  2.9<L>  /  TBili  0.3<L>  /  DBili  0.1  /  AST  28  /  ALT  18  /  AlkPhos  55  04-25        I&O's Summary    24 Apr 2019 07:01  -  25 Apr 2019 07:00  --------------------------------------------------------  IN: 0 mL / OUT: 200 mL / NET: -200 mL        RADIOLOGY & ADDITIONAL STUDIES:    ADVANCE DIRECTIVES:   DNR YES  Completed on:    4/25/19                MOLST  YES   Completed on: 4/25/19  Living Will   NO   Completed on:

## 2019-04-25 NOTE — PROGRESS NOTE ADULT - ASSESSMENT
89F w/ dementia, DM presenting w/ gastric outlet obstruction. s/p EGD yesterday showing a large duodenal bulb ulcer  - NPO/IVF  - replace NGT today  - continue protonix gtt  - f/u duodenal bulb ulcer bx  - Discussion with family and Dr. Lloyd  - continue DVT ppx

## 2019-04-25 NOTE — PHYSICAL THERAPY INITIAL EVALUATION ADULT - GENERAL OBSERVATIONS, REHAB EVAL
Pt rec'd in room supine in bed breathing RA in NAD, NG tube at pt's side. Per notes, pt had pulled NG tube out while donning mittens. Pt with daughter at bedside.

## 2019-04-25 NOTE — PROGRESS NOTE ADULT - ASSESSMENT
89F with obstructing duodenal bulb ulceration involving the entire anterior wall.      Recommendations:  - PPI infusion  - Replace NG tube  - Place PICC line for TPN  - Palliative care following  - Follow up pathology    Will follow peripherally.  Thank you for the consult.  Please do not hesitate to call with any questions or concerns.    JAIME Young MD  [facility]  Division of Gastroenterology  Tel [phone]  Fax [phone]  04-25-19 @ 08:07

## 2019-04-25 NOTE — PROGRESS NOTE ADULT - SUBJECTIVE AND OBJECTIVE BOX
HPI/OVERNIGHT EVENTS:  No acute events overnight. Received her EGD yesterday with Dr. Treadwell which showed a duodenal bulb ulcer. Pt removed her NGT even with her level 1 restraints (mittens on). Patient remained afebrile, vss, hds. She did not have episodes of nausea or vomiting.    MEDICATIONS  (STANDING):  dextrose 5% + sodium chloride 0.9%. 1000 milliLiter(s) (75 mL/Hr) IV Continuous <Continuous>  dextrose 5%. 1000 milliLiter(s) (50 mL/Hr) IV Continuous <Continuous>  dextrose 50% Injectable 12.5 Gram(s) IV Push once  dextrose 50% Injectable 25 Gram(s) IV Push once  dextrose 50% Injectable 25 Gram(s) IV Push once  insulin lispro (HumaLOG) corrective regimen sliding scale   SubCutaneous three times a day before meals  pantoprazole Infusion 8 mG/Hr (10 mL/Hr) IV Continuous <Continuous>    MEDICATIONS  (PRN):  dextrose 40% Gel 15 Gram(s) Oral once PRN Blood Glucose LESS THAN 70 milliGRAM(s)/deciliter  glucagon  Injectable 1 milliGRAM(s) IntraMuscular once PRN Glucose LESS THAN 70 milligrams/deciliter  ondansetron Injectable 4 milliGRAM(s) IV Push every 6 hours PRN Nausea and/or Vomiting      Vital Signs Last 24 Hrs  T(C): 36.7 (25 Apr 2019 07:30), Max: 36.7 (25 Apr 2019 07:30)  T(F): 98 (25 Apr 2019 07:30), Max: 98 (25 Apr 2019 07:30)  HR: 62 (25 Apr 2019 07:30) (62 - 81)  BP: 139/70 (25 Apr 2019 07:30) (122/70 - 163/82)  BP(mean): --  RR: 17 (25 Apr 2019 07:30) (17 - 18)  SpO2: 95% (25 Apr 2019 07:30) (95% - 100%)    gen: nad, a&ox3  cv: rrr  resp: nonlabored breathing  gi: soft, nd, nttp  msk: no c/c/e      I&O's Detail    24 Apr 2019 07:01  -  25 Apr 2019 07:00  --------------------------------------------------------  IN:  Total IN: 0 mL    OUT:    Nasoenteral Tube: 200 mL  Total OUT: 200 mL    Total NET: -200 mL          LABS:                        12.3   2.9   )-----------( 205      ( 25 Apr 2019 05:53 )             36.2     04-25    134<L>  |  98  |  7.0<L>  ----------------------------<  106  4.4   |  20.0<L>  |  0.51    Ca    8.8      25 Apr 2019 05:53  Phos  3.0     04-25  Mg     2.0     04-25

## 2019-04-25 NOTE — PHYSICAL THERAPY INITIAL EVALUATION ADULT - ADDITIONAL COMMENTS
Pt Italian speaking,  via [place]. Pt reporting 2-3 steps to enter the home with Derrek HRs. Daughter at bedside reporting that they assist her mother in all ADLs. Pt uses a RW for ambulation.

## 2019-04-26 LAB
ANION GAP SERPL CALC-SCNC: 13 MMOL/L — SIGNIFICANT CHANGE UP (ref 5–17)
BASOPHILS # BLD AUTO: 0 K/UL — SIGNIFICANT CHANGE UP (ref 0–0.2)
BUN SERPL-MCNC: 6 MG/DL — LOW (ref 8–20)
CALCIUM SERPL-MCNC: 9.2 MG/DL — SIGNIFICANT CHANGE UP (ref 8.6–10.2)
CHLORIDE SERPL-SCNC: 103 MMOL/L — SIGNIFICANT CHANGE UP (ref 98–107)
CO2 SERPL-SCNC: 24 MMOL/L — SIGNIFICANT CHANGE UP (ref 22–29)
CREAT SERPL-MCNC: 0.55 MG/DL — SIGNIFICANT CHANGE UP (ref 0.5–1.3)
EOSINOPHIL # BLD AUTO: 0.1 K/UL — SIGNIFICANT CHANGE UP (ref 0–0.5)
EOSINOPHIL NFR BLD AUTO: 2 % — SIGNIFICANT CHANGE UP (ref 0–5)
GLUCOSE BLDC GLUCOMTR-MCNC: 114 MG/DL — HIGH (ref 70–99)
GLUCOSE BLDC GLUCOMTR-MCNC: 119 MG/DL — HIGH (ref 70–99)
GLUCOSE BLDC GLUCOMTR-MCNC: 125 MG/DL — HIGH (ref 70–99)
GLUCOSE BLDC GLUCOMTR-MCNC: 133 MG/DL — HIGH (ref 70–99)
GLUCOSE BLDC GLUCOMTR-MCNC: 143 MG/DL — HIGH (ref 70–99)
GLUCOSE SERPL-MCNC: 126 MG/DL — HIGH (ref 70–115)
HCT VFR BLD CALC: 35.7 % — LOW (ref 37–47)
HGB BLD-MCNC: 12.4 G/DL — SIGNIFICANT CHANGE UP (ref 12–16)
LYMPHOCYTES # BLD AUTO: 0.8 K/UL — LOW (ref 1–4.8)
LYMPHOCYTES # BLD AUTO: 24 % — SIGNIFICANT CHANGE UP (ref 20–55)
MAGNESIUM SERPL-MCNC: 1.6 MG/DL — SIGNIFICANT CHANGE UP (ref 1.6–2.6)
MCHC RBC-ENTMCNC: 31.5 PG — HIGH (ref 27–31)
MCHC RBC-ENTMCNC: 34.7 G/DL — SIGNIFICANT CHANGE UP (ref 32–36)
MCV RBC AUTO: 90.6 FL — SIGNIFICANT CHANGE UP (ref 81–99)
MONOCYTES # BLD AUTO: 0.4 K/UL — SIGNIFICANT CHANGE UP (ref 0–0.8)
MONOCYTES NFR BLD AUTO: 11 % — HIGH (ref 3–10)
NEUTROPHILS # BLD AUTO: 1.9 K/UL — SIGNIFICANT CHANGE UP (ref 1.8–8)
NEUTROPHILS NFR BLD AUTO: 62 % — SIGNIFICANT CHANGE UP (ref 37–73)
PHOSPHATE SERPL-MCNC: 2.3 MG/DL — LOW (ref 2.4–4.7)
PLATELET # BLD AUTO: 218 K/UL — SIGNIFICANT CHANGE UP (ref 150–400)
POTASSIUM SERPL-MCNC: 3.6 MMOL/L — SIGNIFICANT CHANGE UP (ref 3.5–5.3)
POTASSIUM SERPL-SCNC: 3.6 MMOL/L — SIGNIFICANT CHANGE UP (ref 3.5–5.3)
RBC # BLD: 3.94 M/UL — LOW (ref 4.4–5.2)
RBC # FLD: 12.9 % — SIGNIFICANT CHANGE UP (ref 11–15.6)
SODIUM SERPL-SCNC: 140 MMOL/L — SIGNIFICANT CHANGE UP (ref 135–145)
SURGICAL PATHOLOGY STUDY: SIGNIFICANT CHANGE UP
WBC # BLD: 3.1 K/UL — LOW (ref 4.8–10.8)
WBC # FLD AUTO: 3.1 K/UL — LOW (ref 4.8–10.8)

## 2019-04-26 PROCEDURE — 99233 SBSQ HOSP IP/OBS HIGH 50: CPT

## 2019-04-26 PROCEDURE — 71045 X-RAY EXAM CHEST 1 VIEW: CPT | Mod: 26,77

## 2019-04-26 PROCEDURE — 71045 X-RAY EXAM CHEST 1 VIEW: CPT | Mod: 26

## 2019-04-26 PROCEDURE — 76942 ECHO GUIDE FOR BIOPSY: CPT | Mod: 26,59

## 2019-04-26 PROCEDURE — 76937 US GUIDE VASCULAR ACCESS: CPT | Mod: 26,59

## 2019-04-26 PROCEDURE — 36569 INSJ PICC 5 YR+ W/O IMAGING: CPT

## 2019-04-26 RX ORDER — POTASSIUM PHOSPHATE, MONOBASIC POTASSIUM PHOSPHATE, DIBASIC 236; 224 MG/ML; MG/ML
15 INJECTION, SOLUTION INTRAVENOUS ONCE
Qty: 0 | Refills: 0 | Status: COMPLETED | OUTPATIENT
Start: 2019-04-26 | End: 2019-04-26

## 2019-04-26 RX ORDER — CHLORHEXIDINE GLUCONATE 213 G/1000ML
1 SOLUTION TOPICAL
Qty: 0 | Refills: 0 | Status: DISCONTINUED | OUTPATIENT
Start: 2019-04-26 | End: 2019-05-02

## 2019-04-26 RX ORDER — ELECTROLYTE SOLUTION,INJ
1 VIAL (ML) INTRAVENOUS
Qty: 0 | Refills: 0 | Status: DISCONTINUED | OUTPATIENT
Start: 2019-04-26 | End: 2019-04-27

## 2019-04-26 RX ORDER — SODIUM CHLORIDE 9 MG/ML
10 INJECTION INTRAMUSCULAR; INTRAVENOUS; SUBCUTANEOUS
Qty: 0 | Refills: 0 | Status: DISCONTINUED | OUTPATIENT
Start: 2019-04-26 | End: 2019-05-04

## 2019-04-26 RX ORDER — MAGNESIUM SULFATE 500 MG/ML
2 VIAL (ML) INJECTION ONCE
Qty: 0 | Refills: 0 | Status: COMPLETED | OUTPATIENT
Start: 2019-04-26 | End: 2019-04-26

## 2019-04-26 RX ORDER — INSULIN LISPRO 100/ML
VIAL (ML) SUBCUTANEOUS EVERY 6 HOURS
Qty: 0 | Refills: 0 | Status: DISCONTINUED | OUTPATIENT
Start: 2019-04-26 | End: 2019-05-04

## 2019-04-26 RX ADMIN — PANTOPRAZOLE SODIUM 10 MG/HR: 20 TABLET, DELAYED RELEASE ORAL at 21:27

## 2019-04-26 RX ADMIN — Medication 50 GRAM(S): at 13:25

## 2019-04-26 RX ADMIN — POTASSIUM PHOSPHATE, MONOBASIC POTASSIUM PHOSPHATE, DIBASIC 62.5 MILLIMOLE(S): 236; 224 INJECTION, SOLUTION INTRAVENOUS at 13:25

## 2019-04-26 RX ADMIN — Medication 1 EACH: at 21:28

## 2019-04-26 RX ADMIN — PANTOPRAZOLE SODIUM 10 MG/HR: 20 TABLET, DELAYED RELEASE ORAL at 00:44

## 2019-04-26 NOTE — PROCEDURE NOTE - NSICDXPROCEDURE_GEN_ALL_CORE_FT
PROCEDURES:  US guided needle placement 26-Apr-2019 10:37:52 Andrez Guerra  US guided evaluation for vascular access 26-Apr-2019 10:37:30 Patent right basilic vein Andrez Pak  Insertion, PICC, with imaging guidance 26-Apr-2019 10:37:05 5FR  dual lumen 39CM length 23CIRC BARD POWER PICC LINE ns flush good heme back right basilic vein Andrez Pak
PROCEDURES:  Insertion, nasogastric tube 26-Apr-2019 10:58:36  Ignacio Manjarrez
PROCEDURES:  Insertion, nasogastric tube 20-Apr-2019 19:38:08  Eve Mitchell

## 2019-04-26 NOTE — PROCEDURE NOTE - NSPOSTCAREGUIDE_GEN_A_CORE
Instructed patient/caregiver regarding signs and symptoms of infection/Care for catheter as per unit/ICU protocols/Verbal/written post procedure instructions were given to patient/caregiver/Instructed patient/caregiver to follow-up with primary care physician

## 2019-04-26 NOTE — PROGRESS NOTE ADULT - ASSESSMENT
Patient with GOO, pathology pending.  PICC placed for TPN.  TPN ordered at 1/2 dose to start.      Will continue to follow.  Thank you for the consult.  Please do not hesitate to call with any questions or concerns.    JAIME Young MD  [facility]  Division of Gastroenterology  Tel [phone]  Fax [phone]  04-26-19 @ 11:22

## 2019-04-26 NOTE — PROGRESS NOTE ADULT - SUBJECTIVE AND OBJECTIVE BOX
Pt removed NGT yesterday secondary to dementia.   Extensive discussion with fmshalom concluded that no surgery for pt and will cont w/ NGT decompression for now.   otherwise pt afebrile hd normal w/ stable hh       MEDICATIONS  (STANDING):  dextrose 5%. 1000 milliLiter(s) (50 mL/Hr) IV Continuous <Continuous>  dextrose 50% Injectable 12.5 Gram(s) IV Push once  dextrose 50% Injectable 25 Gram(s) IV Push once  dextrose 50% Injectable 25 Gram(s) IV Push once  insulin lispro (HumaLOG) corrective regimen sliding scale   SubCutaneous three times a day before meals  pantoprazole Infusion 8 mG/Hr (10 mL/Hr) IV Continuous <Continuous>  sodium chloride 0.9%. 1000 milliLiter(s) (75 mL/Hr) IV Continuous <Continuous>    MEDICATIONS  (PRN):  dextrose 40% Gel 15 Gram(s) Oral once PRN Blood Glucose LESS THAN 70 milliGRAM(s)/deciliter  glucagon  Injectable 1 milliGRAM(s) IntraMuscular once PRN Glucose LESS THAN 70 milligrams/deciliter  ondansetron Injectable 4 milliGRAM(s) IV Push every 6 hours PRN Nausea and/or Vomiting      Vital Signs Last 24 Hrs  T(C): 36.7 (25 Apr 2019 23:49), Max: 37.2 (25 Apr 2019 20:02)  T(F): 98.1 (25 Apr 2019 23:49), Max: 99 (25 Apr 2019 20:02)  HR: 68 (25 Apr 2019 23:49) (62 - 87)  BP: 127/72 (25 Apr 2019 23:49) (127/72 - 140/73)  BP(mean): --  RR: 18 (25 Apr 2019 23:49) (17 - 18)  SpO2: 98% (25 Apr 2019 23:49) (95% - 98%)    PE  Gen: nad, baseline dementia   Pulm: ctabl   CV: rrr  Abd: s nt nd   Ext: warm no edema, < 2sec cap refill         I&O's Detail    24 Apr 2019 07:01  -  25 Apr 2019 07:00  --------------------------------------------------------  IN:  Total IN: 0 mL    OUT:    Nasoenteral Tube: 200 mL  Total OUT: 200 mL    Total NET: -200 mL          LABS:                        12.3   2.9   )-----------( 205      ( 25 Apr 2019 05:53 )             36.2     04-25    134<L>  |  98  |  7.0<L>  ----------------------------<  106  4.4   |  20.0<L>  |  0.51    Ca    8.8      25 Apr 2019 05:53  Phos  3.0     04-25  Mg     2.0     04-25    TPro  6.2<L>  /  Alb  2.9<L>  /  TBili  0.3<L>  /  DBili  0.1  /  AST  28  /  ALT  18  /  AlkPhos  55  04-25          RADIOLOGY & ADDITIONAL STUDIES:

## 2019-04-26 NOTE — PROGRESS NOTE ADULT - ASSESSMENT
89F w/ dementia, DM presenting w/ gastric outlet obstruction. s/p EGD yesterday showing a large duodenal bulb ulcer    - NPO/IVF  -  NGT replaced, placed in mittens   - continue protonix gtt  - f/u duodenal bulb ulcer bx  - no surgery will cont ngt likely tpn via picc   - continue DVT ppx

## 2019-04-26 NOTE — PROGRESS NOTE ADULT - SUBJECTIVE AND OBJECTIVE BOX
Chief Complaint: This is a 89y old Female patient being seen for follow-up consultation for GOO.    HPI / 24H events:  Patient had PICC placed for TPN.  Removed her NGT overnight.  Replaced this morning.      ROS: A 14-point review of systems was reviewed and was otherwise negative save what was reported in the HPI.    PAST MEDICAL/SURGICAL HISTORY:  Arthritis  Dementia    MEDICATIONS  (STANDING):  chlorhexidine 2% Cloths 1 Application(s) Topical <User Schedule>  dextrose 5%. 1000 milliLiter(s) (50 mL/Hr) IV Continuous <Continuous>  dextrose 50% Injectable 12.5 Gram(s) IV Push once  dextrose 50% Injectable 25 Gram(s) IV Push once  dextrose 50% Injectable 25 Gram(s) IV Push once  insulin lispro (HumaLOG) corrective regimen sliding scale   SubCutaneous three times a day before meals  magnesium sulfate  IVPB 2 Gram(s) IV Intermittent once  pantoprazole Infusion 8 mG/Hr (10 mL/Hr) IV Continuous <Continuous>  Parenteral Nutrition - Adult 1 Each (42 mL/Hr) TPN Continuous <Continuous>  potassium phosphate IVPB 15 milliMole(s) IV Intermittent once  sodium chloride 0.9%. 1000 milliLiter(s) (75 mL/Hr) IV Continuous <Continuous>    MEDICATIONS  (PRN):  dextrose 40% Gel 15 Gram(s) Oral once PRN Blood Glucose LESS THAN 70 milliGRAM(s)/deciliter  glucagon  Injectable 1 milliGRAM(s) IntraMuscular once PRN Glucose LESS THAN 70 milligrams/deciliter  ondansetron Injectable 4 milliGRAM(s) IV Push every 6 hours PRN Nausea and/or Vomiting  sodium chloride 0.9% lock flush 10 milliLiter(s) IV Push every 1 hour PRN Pre/post blood products, medications, blood draw, and to maintain line patency    VITAL SIGNS LAST 24 HOURS:  T(C): 36.7 (26 Apr 2019 07:20), Max: 37.2 (25 Apr 2019 20:02)  T(F): 98.1 (26 Apr 2019 07:20), Max: 99 (25 Apr 2019 20:02)  HR: 89 (26 Apr 2019 07:20) (68 - 89)  BP: 151/77 (26 Apr 2019 07:20) (127/72 - 151/77)  BP(mean): --  RR: 18 (26 Apr 2019 07:20) (18 - 18)  SpO2: 94% (26 Apr 2019 07:20) (94% - 98%)      PHYSICAL EXAM:  Constitutional: Well-developed, well-nourished, in no apparent distress  Eyes: Sclerae anicteric, conjunctivae normal  ENMT: Mucus membranes moist, no oropharyngeal thrush noted  Neck: No thyroid nodules appreciated, no significant cervical or supraclavicular lymphadenopathy  Respiratory: Breathing nonlabored; clear to auscultation  Cardiovascular: Regular rate and rhythm  Gastrointestinal: Soft, nontender, nondistended, hypoactive bowel sounds; tympanitic to percussion, no hepatosplenomegaly appreciated; no rebound tenderness or involuntary guarding  Extremities: No clubbing, cyanosis or edema  Neurological: No asterixis  Skin: No jaundice  Lymph Nodes: No significant lymphadenopathy  Musculoskeletal: No significant peripheral atrophy                            12.4   3.1   )-----------( 218      ( 26 Apr 2019 06:52 )             35.7     04-26    140  |  103  |  6.0<L>  ----------------------------<  126<H>  3.6   |  24.0  |  0.55    Ca    9.2      26 Apr 2019 06:52  Phos  2.3     04-26  Mg     1.6     04-26    TPro  6.2<L>  /  Alb  2.9<L>  /  TBili  0.3<L>  /  DBili  0.1  /  AST  28  /  ALT  18  /  AlkPhos  55  04-25    LIVER FUNCTIONS - ( 25 Apr 2019 05:53 )  Alb: 2.9 g/dL / Pro: 6.2 g/dL / ALK PHOS: 55 U/L / ALT: 18 U/L / AST: 28 U/L / GGT: x

## 2019-04-26 NOTE — PROCEDURE NOTE - NSINFORMCONSENT_GEN_A_CORE
Benefits, risks, and possible complications of procedure explained to patient/caregiver who verbalized understanding and gave verbal consent.
Benefits, risks, and possible complications of procedure explained to patient/caregiver who verbalized understanding and gave written consent./ utilized , daughter surrogate present

## 2019-04-26 NOTE — PROGRESS NOTE ADULT - SUBJECTIVE AND OBJECTIVE BOX
OVERNIGHT EVENTS: pulled out NGT despite being restrained     BRIEF HOSPITAL COURSE:89 year old [place] female who resides with her daughter Roxanna  and attends [employer] program 4 days a week, who was admitted to [facility] for vomiting and epigastric pain. Per daughter pt had been eating less and less over the past few weeks prior to admission and had lost 17 pounds.  Investigations have revealed GOO. Pt had 2 EGDs, found large obstructing duodenal ulcer and biopsies taken. Pt had NGt replaced and has pulled it several times now. PICC inserted for TPN    Present Symptoms: pt cannot attend to symptom/ROS    Dyspnea: 0   Nausea/Vomiting: Yes No  Anxiety:  Yes No  Depression: Yes No  Fatigue: Yes No  Loss of appetite: Yes No    Pain:             Character-            Duration-            Effect-            Factors-            Frequency-            Location-            Severity-    Review of Systems:  Unable to obtain due to poor mentation       MEDICATIONS  (STANDING):  chlorhexidine 2% Cloths 1 Application(s) Topical <User Schedule>  dextrose 5%. 1000 milliLiter(s) (50 mL/Hr) IV Continuous <Continuous>  dextrose 50% Injectable 12.5 Gram(s) IV Push once  dextrose 50% Injectable 25 Gram(s) IV Push once  dextrose 50% Injectable 25 Gram(s) IV Push once  insulin lispro (HumaLOG) corrective regimen sliding scale   SubCutaneous three times a day before meals  magnesium sulfate  IVPB 2 Gram(s) IV Intermittent once  pantoprazole Infusion 8 mG/Hr (10 mL/Hr) IV Continuous <Continuous>  Parenteral Nutrition - Adult 1 Each (42 mL/Hr) TPN Continuous <Continuous>  potassium phosphate IVPB 15 milliMole(s) IV Intermittent once  sodium chloride 0.9%. 1000 milliLiter(s) (75 mL/Hr) IV Continuous <Continuous>    MEDICATIONS  (PRN):  dextrose 40% Gel 15 Gram(s) Oral once PRN Blood Glucose LESS THAN 70 milliGRAM(s)/deciliter  glucagon  Injectable 1 milliGRAM(s) IntraMuscular once PRN Glucose LESS THAN 70 milligrams/deciliter  ondansetron Injectable 4 milliGRAM(s) IV Push every 6 hours PRN Nausea and/or Vomiting  sodium chloride 0.9% lock flush 10 milliLiter(s) IV Push every 1 hour PRN Pre/post blood products, medications, blood draw, and to maintain line patency      PHYSICAL EXAM:    Vital Signs Last 24 Hrs  T(C): 36.7 (26 Apr 2019 07:20), Max: 37.2 (25 Apr 2019 20:02)  T(F): 98.1 (26 Apr 2019 07:20), Max: 99 (25 Apr 2019 20:02)  HR: 89 (26 Apr 2019 07:20) (68 - 89)  BP: 151/77 (26 Apr 2019 07:20) (127/72 - 151/77)  BP(mean): --  RR: 18 (26 Apr 2019 07:20) (18 - 18)  SpO2: 94% (26 Apr 2019 07:20) (94% - 98%)    General: lethargic                     Karnofsky: 30 %    HEENT: normal      Lungs: comfortable    CV: normal     GI: normal  distended  tender  no BS               NG tube  constipation  last BM:     :   incontinent    MSK: normal  weakness                Skin: normal no rash    LABS:                          12.4   3.1   )-----------( 218      ( 26 Apr 2019 06:52 )             35.7     04-26    140  |  103  |  6.0<L>  ----------------------------<  126<H>  3.6   |  24.0  |  0.55    Ca    9.2      26 Apr 2019 06:52  Phos  2.3     04-26  Mg     1.6     04-26    TPro  6.2<L>  /  Alb  2.9<L>  /  TBili  0.3<L>  /  DBili  0.1  /  AST  28  /  ALT  18  /  AlkPhos  55  04-25        I&O's Summary      RADIOLOGY & ADDITIONAL STUDIES:    ADVANCE DIRECTIVES:   DNR YES  Completed on:      4/25/19               MOLST  YES   Completed on: 4/25/19  Living Will  NO   Completed on:

## 2019-04-27 LAB
ALBUMIN SERPL ELPH-MCNC: 3.1 G/DL — LOW (ref 3.3–5.2)
ALP SERPL-CCNC: 54 U/L — SIGNIFICANT CHANGE UP (ref 40–120)
ALT FLD-CCNC: 16 U/L — SIGNIFICANT CHANGE UP
ANION GAP SERPL CALC-SCNC: 13 MMOL/L — SIGNIFICANT CHANGE UP (ref 5–17)
AST SERPL-CCNC: 21 U/L — SIGNIFICANT CHANGE UP
BILIRUB SERPL-MCNC: 0.3 MG/DL — LOW (ref 0.4–2)
BLD GP AB SCN SERPL QL: SIGNIFICANT CHANGE UP
BUN SERPL-MCNC: 6 MG/DL — LOW (ref 8–20)
CALCIUM SERPL-MCNC: 9 MG/DL — SIGNIFICANT CHANGE UP (ref 8.6–10.2)
CHLORIDE SERPL-SCNC: 99 MMOL/L — SIGNIFICANT CHANGE UP (ref 98–107)
CO2 SERPL-SCNC: 24 MMOL/L — SIGNIFICANT CHANGE UP (ref 22–29)
CREAT SERPL-MCNC: 0.45 MG/DL — LOW (ref 0.5–1.3)
EOSINOPHIL # BLD AUTO: 0.1 K/UL — SIGNIFICANT CHANGE UP (ref 0–0.5)
EOSINOPHIL NFR BLD AUTO: 1.8 % — SIGNIFICANT CHANGE UP (ref 0–6)
GLUCOSE BLDC GLUCOMTR-MCNC: 211 MG/DL — HIGH (ref 70–99)
GLUCOSE BLDC GLUCOMTR-MCNC: 214 MG/DL — HIGH (ref 70–99)
GLUCOSE BLDC GLUCOMTR-MCNC: 215 MG/DL — HIGH (ref 70–99)
GLUCOSE BLDC GLUCOMTR-MCNC: 220 MG/DL — HIGH (ref 70–99)
GLUCOSE BLDC GLUCOMTR-MCNC: 240 MG/DL — HIGH (ref 70–99)
GLUCOSE SERPL-MCNC: 218 MG/DL — HIGH (ref 70–115)
HCT VFR BLD CALC: 38 % — SIGNIFICANT CHANGE UP (ref 37–47)
HGB BLD-MCNC: 12.9 G/DL — SIGNIFICANT CHANGE UP (ref 12–16)
LYMPHOCYTES # BLD AUTO: 0.6 K/UL — LOW (ref 1–4.8)
LYMPHOCYTES # BLD AUTO: 18.7 % — LOW (ref 20–55)
MAGNESIUM SERPL-MCNC: 1.7 MG/DL — SIGNIFICANT CHANGE UP (ref 1.6–2.6)
MCHC RBC-ENTMCNC: 30.8 PG — SIGNIFICANT CHANGE UP (ref 27–31)
MCHC RBC-ENTMCNC: 33.9 G/DL — SIGNIFICANT CHANGE UP (ref 32–36)
MCV RBC AUTO: 90.7 FL — SIGNIFICANT CHANGE UP (ref 81–99)
MONOCYTES # BLD AUTO: 0.4 K/UL — SIGNIFICANT CHANGE UP (ref 0–0.8)
MONOCYTES NFR BLD AUTO: 11.6 % — HIGH (ref 3–10)
NEUTROPHILS # BLD AUTO: 2.3 K/UL — SIGNIFICANT CHANGE UP (ref 1.8–8)
NEUTROPHILS NFR BLD AUTO: 67.6 % — SIGNIFICANT CHANGE UP (ref 37–73)
PHOSPHATE SERPL-MCNC: 2.5 MG/DL — SIGNIFICANT CHANGE UP (ref 2.4–4.7)
PLATELET # BLD AUTO: 194 K/UL — SIGNIFICANT CHANGE UP (ref 150–400)
POTASSIUM SERPL-MCNC: 4 MMOL/L — SIGNIFICANT CHANGE UP (ref 3.5–5.3)
POTASSIUM SERPL-SCNC: 4 MMOL/L — SIGNIFICANT CHANGE UP (ref 3.5–5.3)
PROT SERPL-MCNC: 6.2 G/DL — LOW (ref 6.6–8.7)
RBC # BLD: 4.19 M/UL — LOW (ref 4.4–5.2)
RBC # FLD: 13.1 % — SIGNIFICANT CHANGE UP (ref 11–15.6)
SODIUM SERPL-SCNC: 136 MMOL/L — SIGNIFICANT CHANGE UP (ref 135–145)
TYPE + AB SCN PNL BLD: SIGNIFICANT CHANGE UP
WBC # BLD: 3.4 K/UL — LOW (ref 4.8–10.8)
WBC # FLD AUTO: 3.4 K/UL — LOW (ref 4.8–10.8)

## 2019-04-27 PROCEDURE — 99232 SBSQ HOSP IP/OBS MODERATE 35: CPT

## 2019-04-27 RX ORDER — ELECTROLYTE SOLUTION,INJ
1 VIAL (ML) INTRAVENOUS
Qty: 0 | Refills: 0 | Status: DISCONTINUED | OUTPATIENT
Start: 2019-04-27 | End: 2019-04-27

## 2019-04-27 RX ORDER — I.V. FAT EMULSION 20 G/100ML
0.88 EMULSION INTRAVENOUS
Qty: 50 | Refills: 0 | Status: DISCONTINUED | OUTPATIENT
Start: 2019-04-27 | End: 2019-04-27

## 2019-04-27 RX ORDER — MAGNESIUM SULFATE 500 MG/ML
2 VIAL (ML) INJECTION ONCE
Qty: 0 | Refills: 0 | Status: COMPLETED | OUTPATIENT
Start: 2019-04-27 | End: 2019-04-27

## 2019-04-27 RX ADMIN — PANTOPRAZOLE SODIUM 10 MG/HR: 20 TABLET, DELAYED RELEASE ORAL at 21:37

## 2019-04-27 RX ADMIN — PANTOPRAZOLE SODIUM 10 MG/HR: 20 TABLET, DELAYED RELEASE ORAL at 01:06

## 2019-04-27 RX ADMIN — Medication 4: at 06:35

## 2019-04-27 RX ADMIN — PANTOPRAZOLE SODIUM 10 MG/HR: 20 TABLET, DELAYED RELEASE ORAL at 12:08

## 2019-04-27 RX ADMIN — Medication 83 EACH: at 21:00

## 2019-04-27 RX ADMIN — Medication 4: at 13:24

## 2019-04-27 RX ADMIN — Medication 1000 MILLIGRAM(S): at 17:29

## 2019-04-27 RX ADMIN — Medication 4: at 19:20

## 2019-04-27 RX ADMIN — Medication 50 GRAM(S): at 16:09

## 2019-04-27 RX ADMIN — CHLORHEXIDINE GLUCONATE 1 APPLICATION(S): 213 SOLUTION TOPICAL at 06:35

## 2019-04-27 RX ADMIN — Medication 4: at 01:08

## 2019-04-27 RX ADMIN — I.V. FAT EMULSION 31.68 GM/KG/DAY: 20 EMULSION INTRAVENOUS at 21:36

## 2019-04-27 NOTE — PROGRESS NOTE ADULT - ASSESSMENT
Patient with GOO, pathology pending.  PICC placed for TPN.  TPN ordered     Will continue to follow.  Thank you for the consult. 89y woman with GOO secondary to duodenal ulcer. pathology pending.  TPN ordered   continue sliding scale  will add insulin to TPN tomorrow  Will continue to follow.  Thank you for the consult.

## 2019-04-27 NOTE — PROGRESS NOTE ADULT - SUBJECTIVE AND OBJECTIVE BOX
HPI/OVERNIGHT EVENTS: Patient seen and examined at bedside this AM. Patient removed NG tube and was not re-inserted after discussion with GI team and was ok for sips of clears. Continues to be on TPN.     Vital Signs Last 24 Hrs  T(C): 37.3 (28 Apr 2019 00:05), Max: 37.3 (27 Apr 2019 18:45)  T(F): 99.1 (28 Apr 2019 00:05), Max: 99.1 (27 Apr 2019 18:45)  HR: 86 (28 Apr 2019 00:05) (76 - 87)  BP: 153/86 (28 Apr 2019 00:05) (143/80 - 164/85)  BP(mean): --  RR: 20 (28 Apr 2019 00:05) (18 - 20)  SpO2: 97% (27 Apr 2019 18:45) (96% - 98%)    I&O's Detail    26 Apr 2019 07:01  -  27 Apr 2019 07:00  --------------------------------------------------------  IN:    pantoprazole Infusion: 120 mL    sodium chloride 0.9%.: 900 mL    TPN (Total Parenteral Nutrition): 420 mL  Total IN: 1440 mL    OUT:    Nasoenteral Tube: 70 mL  Total OUT: 70 mL    Total NET: 1370 mL    Constitutional: Elderly patient resting comfortably in bed, in no acute distress  HEENT: EOMI / PERRL b/l  Neck: No JVD, full ROM without pain  Respiratory: CTAB with respirations are unlabored, no accessory muscle use, no conversational dyspnea  Cardiovascular: Normal S1 and S2   Gastrointestinal: Abdomen soft, non-tender, non-distended, no rebound tenderness / guarding  Neurological: Baseline dementia   Psychiatric: Flat affect   Musculoskeletal: No joint pain, swelling or deformity; no limitation of movement    LABS:                        12.9   3.4   )-----------( 194      ( 27 Apr 2019 07:21 )             38.0     04-27    136  |  99  |  6.0<L>  ----------------------------<  218<H>  4.0   |  24.0  |  0.45<L>    Ca    9.0      27 Apr 2019 07:21  Phos  2.5     04-27  Mg     1.7     04-27    TPro  6.2<L>  /  Alb  3.1<L>  /  TBili  0.3<L>  /  DBili  x   /  AST  21  /  ALT  16  /  AlkPhos  54  04-27    MEDICATIONS  (STANDING):  chlorhexidine 2% Cloths 1 Application(s) Topical <User Schedule>  dextrose 5%. 1000 milliLiter(s) (50 mL/Hr) IV Continuous <Continuous>  dextrose 50% Injectable 12.5 Gram(s) IV Push once  dextrose 50% Injectable 25 Gram(s) IV Push once  dextrose 50% Injectable 25 Gram(s) IV Push once  fat emulsion (Plant Based) 20% Infusion 0.88 Gm/kG/Day (31.68 mL/Hr) IV Continuous <Continuous>  insulin lispro (HumaLOG) corrective regimen sliding scale   SubCutaneous every 6 hours  pantoprazole Infusion 8 mG/Hr (10 mL/Hr) IV Continuous <Continuous>  Parenteral Nutrition - Adult 1 Each (83 mL/Hr) TPN Continuous <Continuous>  sodium chloride 0.9%. 1000 milliLiter(s) (75 mL/Hr) IV Continuous <Continuous>    MEDICATIONS  (PRN):  dextrose 40% Gel 15 Gram(s) Oral once PRN Blood Glucose LESS THAN 70 milliGRAM(s)/deciliter  glucagon  Injectable 1 milliGRAM(s) IntraMuscular once PRN Glucose LESS THAN 70 milligrams/deciliter  ondansetron Injectable 4 milliGRAM(s) IV Push every 6 hours PRN Nausea and/or Vomiting  sodium chloride 0.9% lock flush 10 milliLiter(s) IV Push every 1 hour PRN Pre/post blood products, medications, blood draw, and to maintain line patency    MICRO:      STUDIES:

## 2019-04-27 NOTE — PROGRESS NOTE ADULT - SUBJECTIVE AND OBJECTIVE BOX
INTERVAL HPI/OVERNIGHT EVENTS:     ROS wnl     PAST MEDICAL & SURGICAL HISTORY:  Arthritis  Dementia      Home Medications:  donepezil 10 mg oral tablet: 1 tab(s) orally once a day (at bedtime) (20 Apr 2019 22:11)  memantine 10 mg oral tablet: 1 tab(s) orally 2 times a day (20 Apr 2019 22:11)  metFORMIN 1000 mg oral tablet: 1 tab(s) orally 2 times a day (20 Apr 2019 22:11)      MEDICATIONS  (STANDING):  chlorhexidine 2% Cloths 1 Application(s) Topical <User Schedule>  dextrose 5%. 1000 milliLiter(s) (50 mL/Hr) IV Continuous <Continuous>  dextrose 50% Injectable 12.5 Gram(s) IV Push once  dextrose 50% Injectable 25 Gram(s) IV Push once  dextrose 50% Injectable 25 Gram(s) IV Push once  insulin lispro (HumaLOG) corrective regimen sliding scale   SubCutaneous every 6 hours  pantoprazole Infusion 8 mG/Hr (10 mL/Hr) IV Continuous <Continuous>  Parenteral Nutrition - Adult 1 Each (42 mL/Hr) TPN Continuous <Continuous>  sodium chloride 0.9%. 1000 milliLiter(s) (75 mL/Hr) IV Continuous <Continuous>    MEDICATIONS  (PRN):  dextrose 40% Gel 15 Gram(s) Oral once PRN Blood Glucose LESS THAN 70 milliGRAM(s)/deciliter  glucagon  Injectable 1 milliGRAM(s) IntraMuscular once PRN Glucose LESS THAN 70 milligrams/deciliter  ondansetron Injectable 4 milliGRAM(s) IV Push every 6 hours PRN Nausea and/or Vomiting  sodium chloride 0.9% lock flush 10 milliLiter(s) IV Push every 1 hour PRN Pre/post blood products, medications, blood draw, and to maintain line patency      Allergies    No Known Allergies    Intolerances          PHYSICAL EXAM:   Vital Signs:  Vital Signs Last 24 Hrs  T(C): 36.8 (27 Apr 2019 07:40), Max: 36.8 (26 Apr 2019 23:32)  T(F): 98.2 (27 Apr 2019 07:40), Max: 98.3 (26 Apr 2019 23:32)  HR: 76 (27 Apr 2019 07:40) (69 - 82)  BP: 143/80 (27 Apr 2019 07:40) (143/80 - 172/86)  BP(mean): --  RR: 18 (27 Apr 2019 07:40) (18 - 18)  SpO2: 96% (27 Apr 2019 07:40) (96% - 99%)  Daily     Daily     GENERAL:  no distress  HEENT:  NC/AT,  anicteric  ABDOMEN:  Soft, non-tender, non-distended, normoactive bowel sounds,  no masses ,no hepato-splenomegaly, no signs of chronic liver disease  EXTEREMITIES:  no cyanosis      LABS:                        12.9   3.4   )-----------( 194      ( 27 Apr 2019 07:21 )             38.0       Hemoglobin: 12.9 g/dL (04-27-19 @ 07:21)  Hemoglobin: 12.4 g/dL (04-26-19 @ 06:52)  Hemoglobin: 12.3 g/dL (04-25-19 @ 05:53)      04-27    136  |  99  |  6.0<L>  ----------------------------<  218<H>  4.0   |  24.0  |  0.45<L>    Ca    9.0      27 Apr 2019 07:21  Phos  2.5     04-27  Mg     1.7     04-27    TPro  6.2<L>  /  Alb  3.1<L>  /  TBili  0.3<L>  /  DBili  x   /  AST  21  /  ALT  16  /  AlkPhos  54  04-27          Aspartate Aminotransferase (AST/SGOT): 21 U/L (04-27-19 @ 07:21)  Alkaline Phosphatase, Serum: 54 U/L (04-27-19 @ 07:21)  Alanine Aminotransferase (ALT/SGPT): 16 U/L (04-27-19 @ 07:21)  Alkaline Phosphatase, Serum: 55 U/L (04-25-19 @ 05:53)  Aspartate Aminotransferase (AST/SGOT): 28 U/L (04-25-19 @ 05:53)  Bilirubin Direct, Serum: 0.1 mg/dL (04-25-19 @ 05:53)  Alanine Aminotransferase (ALT/SGPT): 18 U/L (04-25-19 @ 05:53)            RADIOLOGY & ADDITIONAL TESTS: INTERVAL HPI/OVERNIGHT EVENTS: started on TPN. Hyperglycemic.     ROS wnl     PAST MEDICAL & SURGICAL HISTORY:  Arthritis  Dementia      Home Medications:  donepezil 10 mg oral tablet: 1 tab(s) orally once a day (at bedtime) (20 Apr 2019 22:11)  memantine 10 mg oral tablet: 1 tab(s) orally 2 times a day (20 Apr 2019 22:11)  metFORMIN 1000 mg oral tablet: 1 tab(s) orally 2 times a day (20 Apr 2019 22:11)      MEDICATIONS  (STANDING):  chlorhexidine 2% Cloths 1 Application(s) Topical <User Schedule>  dextrose 5%. 1000 milliLiter(s) (50 mL/Hr) IV Continuous <Continuous>  dextrose 50% Injectable 12.5 Gram(s) IV Push once  dextrose 50% Injectable 25 Gram(s) IV Push once  dextrose 50% Injectable 25 Gram(s) IV Push once  insulin lispro (HumaLOG) corrective regimen sliding scale   SubCutaneous every 6 hours  pantoprazole Infusion 8 mG/Hr (10 mL/Hr) IV Continuous <Continuous>  Parenteral Nutrition - Adult 1 Each (42 mL/Hr) TPN Continuous <Continuous>  sodium chloride 0.9%. 1000 milliLiter(s) (75 mL/Hr) IV Continuous <Continuous>    MEDICATIONS  (PRN):  dextrose 40% Gel 15 Gram(s) Oral once PRN Blood Glucose LESS THAN 70 milliGRAM(s)/deciliter  glucagon  Injectable 1 milliGRAM(s) IntraMuscular once PRN Glucose LESS THAN 70 milligrams/deciliter  ondansetron Injectable 4 milliGRAM(s) IV Push every 6 hours PRN Nausea and/or Vomiting  sodium chloride 0.9% lock flush 10 milliLiter(s) IV Push every 1 hour PRN Pre/post blood products, medications, blood draw, and to maintain line patency      Allergies    No Known Allergies    Intolerances      PHYSICAL EXAM:   Vital Signs:  Vital Signs Last 24 Hrs  T(C): 36.8 (27 Apr 2019 07:40), Max: 36.8 (26 Apr 2019 23:32)  T(F): 98.2 (27 Apr 2019 07:40), Max: 98.3 (26 Apr 2019 23:32)  HR: 76 (27 Apr 2019 07:40) (69 - 82)  BP: 143/80 (27 Apr 2019 07:40) (143/80 - 172/86)  BP(mean): --  RR: 18 (27 Apr 2019 07:40) (18 - 18)  SpO2: 96% (27 Apr 2019 07:40) (96% - 99%)  Daily     Daily     GENERAL:  no distress  HEENT:  NC/AT,  anicteric  ABDOMEN:  Soft, non-tender, non-distended, normoactive bowel sounds  EXTEREMITIES:  no cyanosis      LABS:                        12.9   3.4   )-----------( 194      ( 27 Apr 2019 07:21 )             38.0       Hemoglobin: 12.9 g/dL (04-27-19 @ 07:21)  Hemoglobin: 12.4 g/dL (04-26-19 @ 06:52)  Hemoglobin: 12.3 g/dL (04-25-19 @ 05:53)      04-27    136  |  99  |  6.0<L>  ----------------------------<  218<H>  4.0   |  24.0  |  0.45<L>    Ca    9.0      27 Apr 2019 07:21  Phos  2.5     04-27  Mg     1.7     04-27    TPro  6.2<L>  /  Alb  3.1<L>  /  TBili  0.3<L>  /  DBili  x   /  AST  21  /  ALT  16  /  AlkPhos  54  04-27          Aspartate Aminotransferase (AST/SGOT): 21 U/L (04-27-19 @ 07:21)  Alkaline Phosphatase, Serum: 54 U/L (04-27-19 @ 07:21)  Alanine Aminotransferase (ALT/SGPT): 16 U/L (04-27-19 @ 07:21)  Alkaline Phosphatase, Serum: 55 U/L (04-25-19 @ 05:53)  Aspartate Aminotransferase (AST/SGOT): 28 U/L (04-25-19 @ 05:53)  Bilirubin Direct, Serum: 0.1 mg/dL (04-25-19 @ 05:53)  Alanine Aminotransferase (ALT/SGPT): 18 U/L (04-25-19 @ 05:53)

## 2019-04-27 NOTE — PROGRESS NOTE ADULT - ASSESSMENT
89 year old female with GOO secondary to duodenal ulcer currently on TPN in stable condition. Plan to continue TPN for now. Plan for 1 week of PPN and TPN then re-engagement with family.

## 2019-04-28 LAB
ALBUMIN SERPL ELPH-MCNC: 2.9 G/DL — LOW (ref 3.3–5.2)
ALP SERPL-CCNC: 54 U/L — SIGNIFICANT CHANGE UP (ref 40–120)
ALT FLD-CCNC: 14 U/L — SIGNIFICANT CHANGE UP
ANION GAP SERPL CALC-SCNC: 13 MMOL/L — SIGNIFICANT CHANGE UP (ref 5–17)
AST SERPL-CCNC: 12 U/L — SIGNIFICANT CHANGE UP
BILIRUB SERPL-MCNC: <0.2 MG/DL — LOW (ref 0.4–2)
BUN SERPL-MCNC: 9 MG/DL — SIGNIFICANT CHANGE UP (ref 8–20)
CA-I BLD-SCNC: 1.16 MMOL/L — SIGNIFICANT CHANGE UP (ref 1.12–1.3)
CALCIUM SERPL-MCNC: 8.5 MG/DL — LOW (ref 8.6–10.2)
CHLORIDE SERPL-SCNC: 96 MMOL/L — LOW (ref 98–107)
CO2 SERPL-SCNC: 22 MMOL/L — SIGNIFICANT CHANGE UP (ref 22–29)
CREAT SERPL-MCNC: 0.47 MG/DL — LOW (ref 0.5–1.3)
EOSINOPHIL # BLD AUTO: 0 K/UL — SIGNIFICANT CHANGE UP (ref 0–0.5)
EOSINOPHIL NFR BLD AUTO: 1.2 % — SIGNIFICANT CHANGE UP (ref 0–6)
GLUCOSE BLDC GLUCOMTR-MCNC: 285 MG/DL — HIGH (ref 70–99)
GLUCOSE BLDC GLUCOMTR-MCNC: 286 MG/DL — HIGH (ref 70–99)
GLUCOSE BLDC GLUCOMTR-MCNC: 288 MG/DL — HIGH (ref 70–99)
GLUCOSE BLDC GLUCOMTR-MCNC: 329 MG/DL — HIGH (ref 70–99)
GLUCOSE SERPL-MCNC: 360 MG/DL — HIGH (ref 70–115)
HCT VFR BLD CALC: 37.3 % — SIGNIFICANT CHANGE UP (ref 37–47)
HGB BLD-MCNC: 12.6 G/DL — SIGNIFICANT CHANGE UP (ref 12–16)
LYMPHOCYTES # BLD AUTO: 0.6 K/UL — LOW (ref 1–4.8)
LYMPHOCYTES # BLD AUTO: 18.8 % — LOW (ref 20–55)
MAGNESIUM SERPL-MCNC: 1.7 MG/DL — SIGNIFICANT CHANGE UP (ref 1.6–2.6)
MCHC RBC-ENTMCNC: 30.8 PG — SIGNIFICANT CHANGE UP (ref 27–31)
MCHC RBC-ENTMCNC: 33.8 G/DL — SIGNIFICANT CHANGE UP (ref 32–36)
MCV RBC AUTO: 91.2 FL — SIGNIFICANT CHANGE UP (ref 81–99)
MONOCYTES # BLD AUTO: 0.2 K/UL — SIGNIFICANT CHANGE UP (ref 0–0.8)
MONOCYTES NFR BLD AUTO: 7 % — SIGNIFICANT CHANGE UP (ref 3–10)
NEUTROPHILS # BLD AUTO: 2.4 K/UL — SIGNIFICANT CHANGE UP (ref 1.8–8)
NEUTROPHILS NFR BLD AUTO: 72.4 % — SIGNIFICANT CHANGE UP (ref 37–73)
PHOSPHATE SERPL-MCNC: 1.9 MG/DL — LOW (ref 2.4–4.7)
PLATELET # BLD AUTO: 190 K/UL — SIGNIFICANT CHANGE UP (ref 150–400)
POTASSIUM SERPL-MCNC: 3.5 MMOL/L — SIGNIFICANT CHANGE UP (ref 3.5–5.3)
POTASSIUM SERPL-SCNC: 3.5 MMOL/L — SIGNIFICANT CHANGE UP (ref 3.5–5.3)
PROT SERPL-MCNC: 6 G/DL — LOW (ref 6.6–8.7)
RBC # BLD: 4.09 M/UL — LOW (ref 4.4–5.2)
RBC # FLD: 13 % — SIGNIFICANT CHANGE UP (ref 11–15.6)
SODIUM SERPL-SCNC: 131 MMOL/L — LOW (ref 135–145)
WBC # BLD: 3.3 K/UL — LOW (ref 4.8–10.8)
WBC # FLD AUTO: 3.3 K/UL — LOW (ref 4.8–10.8)

## 2019-04-28 PROCEDURE — 99233 SBSQ HOSP IP/OBS HIGH 50: CPT

## 2019-04-28 RX ORDER — POTASSIUM CHLORIDE 20 MEQ
20 PACKET (EA) ORAL
Qty: 0 | Refills: 0 | Status: COMPLETED | OUTPATIENT
Start: 2019-04-28 | End: 2019-04-28

## 2019-04-28 RX ORDER — MAGNESIUM SULFATE 500 MG/ML
1 VIAL (ML) INJECTION ONCE
Qty: 0 | Refills: 0 | Status: COMPLETED | OUTPATIENT
Start: 2019-04-28 | End: 2019-04-28

## 2019-04-28 RX ORDER — I.V. FAT EMULSION 20 G/100ML
0.88 EMULSION INTRAVENOUS
Qty: 50 | Refills: 0 | Status: DISCONTINUED | OUTPATIENT
Start: 2019-04-28 | End: 2019-04-28

## 2019-04-28 RX ORDER — ACETAMINOPHEN 500 MG
1000 TABLET ORAL ONCE
Qty: 0 | Refills: 0 | Status: COMPLETED | OUTPATIENT
Start: 2019-04-28 | End: 2019-04-28

## 2019-04-28 RX ORDER — ELECTROLYTE SOLUTION,INJ
1 VIAL (ML) INTRAVENOUS
Qty: 0 | Refills: 0 | Status: DISCONTINUED | OUTPATIENT
Start: 2019-04-28 | End: 2019-04-28

## 2019-04-28 RX ADMIN — Medication 100 GRAM(S): at 10:15

## 2019-04-28 RX ADMIN — Medication 6: at 12:36

## 2019-04-28 RX ADMIN — CHLORHEXIDINE GLUCONATE 1 APPLICATION(S): 213 SOLUTION TOPICAL at 05:23

## 2019-04-28 RX ADMIN — Medication 400 MILLIGRAM(S): at 16:29

## 2019-04-28 RX ADMIN — Medication 6: at 18:32

## 2019-04-28 RX ADMIN — SODIUM CHLORIDE 75 MILLILITER(S): 9 INJECTION INTRAMUSCULAR; INTRAVENOUS; SUBCUTANEOUS at 18:32

## 2019-04-28 RX ADMIN — Medication 8: at 05:23

## 2019-04-28 RX ADMIN — PANTOPRAZOLE SODIUM 10 MG/HR: 20 TABLET, DELAYED RELEASE ORAL at 16:55

## 2019-04-28 RX ADMIN — Medication 83 EACH: at 23:21

## 2019-04-28 RX ADMIN — Medication 6: at 00:08

## 2019-04-28 RX ADMIN — I.V. FAT EMULSION 31.68 GM/KG/DAY: 20 EMULSION INTRAVENOUS at 23:20

## 2019-04-28 RX ADMIN — Medication 50 MILLIEQUIVALENT(S): at 15:53

## 2019-04-28 RX ADMIN — Medication 50 MILLIEQUIVALENT(S): at 12:37

## 2019-04-28 RX ADMIN — Medication 85 MILLIMOLE(S): at 10:14

## 2019-04-28 RX ADMIN — PANTOPRAZOLE SODIUM 10 MG/HR: 20 TABLET, DELAYED RELEASE ORAL at 08:59

## 2019-04-28 NOTE — PROGRESS NOTE ADULT - ASSESSMENT
89yoF with GOO 2/2 duodenal ulcer. HDS, VSS  - NGT if pt begins to feel nauseous or vomits  - Continue TPN with GI for now  - possible rescope 5/2  - family wants no surgical intervention  - pt can have sips  - dispo: continue inpt care at this time

## 2019-04-28 NOTE — PROGRESS NOTE ADULT - ASSESSMENT
89y woman with GOO secondary to duodenal ulcer. pathology pending.  TPN ordered   continue sliding scale  added some insulin to TPN   Will continue to follow.  Thank you for the consult.

## 2019-04-28 NOTE — CHART NOTE - NSCHARTNOTEFT_GEN_A_CORE
Upon Nutritional Assessment by the Registered Dietitian your patient was determined to meet criteria / has evidence of the following diagnosis/diagnoses:                  [ ]  Moderate Protein Calorie Malnutrition       •  Comprehensive nutrition assessment and consultation        Treatment:    The following diet has been recommended:  continue TPN as teri  PROVIDER Section:     By signing this assessment you are acknowledging and agree with the diagnosis/diagnoses assigned by the Registered Dietitian    Comments:

## 2019-04-28 NOTE — PROGRESS NOTE ADULT - SUBJECTIVE AND OBJECTIVE BOX
INTERVAL HPI/OVERNIGHT EVENTS: Tolerating TPN. Hyperglycemic.     ROS wnl     PAST MEDICAL & SURGICAL HISTORY:  Arthritis  Dementia      Home Medications:  donepezil 10 mg oral tablet: 1 tab(s) orally once a day (at bedtime) (20 Apr 2019 22:11)  memantine 10 mg oral tablet: 1 tab(s) orally 2 times a day (20 Apr 2019 22:11)  metFORMIN 1000 mg oral tablet: 1 tab(s) orally 2 times a day (20 Apr 2019 22:11)      MEDICATIONS  (STANDING):  chlorhexidine 2% Cloths 1 Application(s) Topical <User Schedule>  dextrose 5%. 1000 milliLiter(s) (50 mL/Hr) IV Continuous <Continuous>  dextrose 50% Injectable 12.5 Gram(s) IV Push once  dextrose 50% Injectable 25 Gram(s) IV Push once  dextrose 50% Injectable 25 Gram(s) IV Push once  fat emulsion (Plant Based) 20% Infusion 0.88 Gm/kG/Day (31.68 mL/Hr) IV Continuous <Continuous>  insulin lispro (HumaLOG) corrective regimen sliding scale   SubCutaneous every 6 hours  pantoprazole Infusion 8 mG/Hr (10 mL/Hr) IV Continuous <Continuous>  Parenteral Nutrition - Adult 1 Each (83 mL/Hr) TPN Continuous <Continuous>  Parenteral Nutrition - Adult 1 Each (83 mL/Hr) TPN Continuous <Continuous>  potassium chloride  20 mEq/100 mL IVPB 20 milliEquivalent(s) IV Intermittent every 2 hours  sodium chloride 0.9%. 1000 milliLiter(s) (75 mL/Hr) IV Continuous <Continuous>    MEDICATIONS  (PRN):  dextrose 40% Gel 15 Gram(s) Oral once PRN Blood Glucose LESS THAN 70 milliGRAM(s)/deciliter  glucagon  Injectable 1 milliGRAM(s) IntraMuscular once PRN Glucose LESS THAN 70 milligrams/deciliter  ondansetron Injectable 4 milliGRAM(s) IV Push every 6 hours PRN Nausea and/or Vomiting  sodium chloride 0.9% lock flush 10 milliLiter(s) IV Push every 1 hour PRN Pre/post blood products, medications, blood draw, and to maintain line patency      Allergies    No Known Allergies    Intolerances          PHYSICAL EXAM:   Vital Signs:  Vital Signs Last 24 Hrs  T(C): 38.3 (28 Apr 2019 11:14), Max: 38.3 (28 Apr 2019 11:00)  T(F): 101 (28 Apr 2019 11:14), Max: 101 (28 Apr 2019 11:00)  HR: 102 (28 Apr 2019 11:14) (81 - 109)  BP: 147/77 (28 Apr 2019 11:14) (113/74 - 164/85)  BP(mean): --  RR: 18 (28 Apr 2019 11:14) (18 - 20)  SpO2: 95% (28 Apr 2019 11:14) (95% - 98%)  Daily     Daily     GENERAL:  no distress  HEENT:  NC/AT,  anicteric  ABDOMEN:  Soft, non-tender, non-distended, normoactive bowel sounds,  no masses   EXTEREMITIES:  no cyanosis      LABS:                        12.6   3.3   )-----------( 190      ( 28 Apr 2019 07:12 )             37.3       Hemoglobin: 12.6 g/dL (04-28-19 @ 07:12)  Hemoglobin: 12.9 g/dL (04-27-19 @ 07:21)  Hemoglobin: 12.4 g/dL (04-26-19 @ 06:52)      04-28    131<L>  |  96<L>  |  9.0  ----------------------------<  360<H>  3.5   |  22.0  |  0.47<L>    Ca    8.5<L>      28 Apr 2019 07:12  Phos  1.9     04-28  Mg     1.7     04-28    TPro  6.0<L>  /  Alb  2.9<L>  /  TBili  <0.2<L>  /  DBili  x   /  AST  12  /  ALT  14  /  AlkPhos  54  04-28          Aspartate Aminotransferase (AST/SGOT): 12 U/L (04-28-19 @ 07:12)  Alkaline Phosphatase, Serum: 54 U/L (04-28-19 @ 07:12)  Alanine Aminotransferase (ALT/SGPT): 14 U/L (04-28-19 @ 07:12)  Aspartate Aminotransferase (AST/SGOT): 21 U/L (04-27-19 @ 07:21)  Alkaline Phosphatase, Serum: 54 U/L (04-27-19 @ 07:21)  Alanine Aminotransferase (ALT/SGPT): 16 U/L (04-27-19 @ 07:21)

## 2019-04-28 NOTE — CHART NOTE - NSCHARTNOTEFT_GEN_A_CORE
Source: Patient [ ]  Family [ ]   other [ x] EMR    Current Diet: NPO    Patient reports [ ] nausea  [ ] vomiting [ ] diarrhea [ ] constipation  [ ]chewing problems [ ] swallowing issues  [ ] other:     PO intake:  < 50% [ ]   50-75%  [ ]   %  [ ]  other :    Source for PO intake [ ] Patient [ ] family [ ] chart [ ] staff [ ] other    Enteral /Parenteral Nutrition: TPN providing 1860 non protein kcal and 85 g pro    Current Weight: 60 kg    % Weight Change    Pertinent Medications: MEDICATIONS  (STANDING):  chlorhexidine 2% Cloths 1 Application(s) Topical <User Schedule>  dextrose 5%. 1000 milliLiter(s) (50 mL/Hr) IV Continuous <Continuous>  dextrose 50% Injectable 12.5 Gram(s) IV Push once  dextrose 50% Injectable 25 Gram(s) IV Push once  dextrose 50% Injectable 25 Gram(s) IV Push once  insulin lispro (HumaLOG) corrective regimen sliding scale   SubCutaneous every 6 hours  pantoprazole Infusion 8 mG/Hr (10 mL/Hr) IV Continuous <Continuous>  Parenteral Nutrition - Adult 1 Each (83 mL/Hr) TPN Continuous <Continuous>  potassium chloride  20 mEq/100 mL IVPB 20 milliEquivalent(s) IV Intermittent every 2 hours  sodium chloride 0.9%. 1000 milliLiter(s) (75 mL/Hr) IV Continuous <Continuous>    MEDICATIONS  (PRN):  dextrose 40% Gel 15 Gram(s) Oral once PRN Blood Glucose LESS THAN 70 milliGRAM(s)/deciliter  glucagon  Injectable 1 milliGRAM(s) IntraMuscular once PRN Glucose LESS THAN 70 milligrams/deciliter  ondansetron Injectable 4 milliGRAM(s) IV Push every 6 hours PRN Nausea and/or Vomiting  sodium chloride 0.9% lock flush 10 milliLiter(s) IV Push every 1 hour PRN Pre/post blood products, medications, blood draw, and to maintain line patency    Pertinent Labs: CBC Full  -  ( 28 Apr 2019 07:12 )  WBC Count : 3.3 K/uL  RBC Count : 4.09 M/uL  Hemoglobin : 12.6 g/dL  Hematocrit : 37.3 %  Platelet Count - Automated : 190 K/uL  Mean Cell Volume : 91.2 fl  Mean Cell Hemoglobin : 30.8 pg  Mean Cell Hemoglobin Concentration : 33.8 g/dL  Auto Neutrophil # : 2.4 K/uL  Auto Lymphocyte # : 0.6 K/uL  Auto Monocyte # : 0.2 K/uL  Auto Eosinophil # : 0.0 K/uL  Auto Basophil # : x  Auto Neutrophil % : 72.4 %  Auto Lymphocyte % : 18.8 %  Auto Monocyte % : 7.0 %  Auto Eosinophil % : 1.2 %  Auto Basophil % : x  04-28 Na131 mmol/L<L> Glu 360 mg/dL<H> K+ 3.5 mmol/L Cr  0.47 mg/dL<L> BUN 9.0 mg/dL Phos 1.9 mg/dL<L> Alb 2.9 g/dL<L> PAB n/a               Skin:   none noted  Nutrition focused physical exam conducted - found signs of malnutrition [ ]absent [ x]present    Subcutaneous fat loss: [x ] Orbital fat pads region, [ x]Buccal fat region, [ ]Triceps region,  [ ]Ribs region    Muscle wasting: [x ]Temples region, [x ]Clavicle region, [ ]Shoulder region, [ ]Scapula region, [ ]Interosseous region,  [ ]thigh region, [ ]Calf region    Estimated Needs:   [x ] no change since previous assessment  [ ] recalculated:     Current Nutrition Diagnosis: Pt presents at risk secondary to chromic moderate protein calorie malnutrition, related to gastric outlet obstruction and duodenal ulcers,  as evidenced by <75% of nutritional needs x 1 month, 17 lb (12%) wt loss x 6 months, and physical signs. Pt started on TPN ( appropriate to meet needs) sometimes in mitts for pulling at lines. Pathology pending obstruction. palliative care following.       Recommendations:   continue to honor pt / family wishes   provide adequate nutrients via tolerated route    Monitoring and Evaluation:   [ ] PO intake [ ] Tolerance to diet prescription [X] Weights  [X] Follow up per protocol [X] Labs:

## 2019-04-28 NOTE — PROGRESS NOTE ADULT - SUBJECTIVE AND OBJECTIVE BOX
HPI/OVERNIGHT EVENTS:  No acute events overnight. Patient continues to pull her NGT regardless of level 1 restraints. Discussed with GI who said NGT is not needed. Denies f/c/n/v/cp/sob.    MEDICATIONS  (STANDING):  chlorhexidine 2% Cloths 1 Application(s) Topical <User Schedule>  dextrose 5%. 1000 milliLiter(s) (50 mL/Hr) IV Continuous <Continuous>  dextrose 50% Injectable 12.5 Gram(s) IV Push once  dextrose 50% Injectable 25 Gram(s) IV Push once  dextrose 50% Injectable 25 Gram(s) IV Push once  fat emulsion (Plant Based) 20% Infusion 0.88 Gm/kG/Day (31.68 mL/Hr) IV Continuous <Continuous>  insulin lispro (HumaLOG) corrective regimen sliding scale   SubCutaneous every 6 hours  pantoprazole Infusion 8 mG/Hr (10 mL/Hr) IV Continuous <Continuous>  Parenteral Nutrition - Adult 1 Each (83 mL/Hr) TPN Continuous <Continuous>  Parenteral Nutrition - Adult 1 Each (83 mL/Hr) TPN Continuous <Continuous>  potassium chloride  20 mEq/100 mL IVPB 20 milliEquivalent(s) IV Intermittent every 2 hours  sodium chloride 0.9%. 1000 milliLiter(s) (75 mL/Hr) IV Continuous <Continuous>    MEDICATIONS  (PRN):  dextrose 40% Gel 15 Gram(s) Oral once PRN Blood Glucose LESS THAN 70 milliGRAM(s)/deciliter  glucagon  Injectable 1 milliGRAM(s) IntraMuscular once PRN Glucose LESS THAN 70 milligrams/deciliter  ondansetron Injectable 4 milliGRAM(s) IV Push every 6 hours PRN Nausea and/or Vomiting  sodium chloride 0.9% lock flush 10 milliLiter(s) IV Push every 1 hour PRN Pre/post blood products, medications, blood draw, and to maintain line patency      Vital Signs Last 24 Hrs  T(C): 38.3 (28 Apr 2019 11:14), Max: 38.3 (28 Apr 2019 11:00)  T(F): 101 (28 Apr 2019 11:14), Max: 101 (28 Apr 2019 11:00)  HR: 102 (28 Apr 2019 11:14) (81 - 109)  BP: 147/77 (28 Apr 2019 11:14) (113/74 - 164/85)  BP(mean): --  RR: 18 (28 Apr 2019 11:14) (18 - 20)  SpO2: 95% (28 Apr 2019 11:14) (95% - 98%)    gen: nad, a&ox3  cv: rrr  resp: nonlabored breathing  gi: soft, nd, nttp  msk: no c/c/e      I&O's Detail    27 Apr 2019 07:01  -  28 Apr 2019 07:00  --------------------------------------------------------  IN:    pantoprazole Infusion: 130 mL    sodium chloride 0.9%.: 975 mL    TPN (Total Parenteral Nutrition): 1079 mL  Total IN: 2184 mL    OUT:  Total OUT: 0 mL    Total NET: 2184 mL          LABS:                        12.6   3.3   )-----------( 190      ( 28 Apr 2019 07:12 )             37.3     04-28    131<L>  |  96<L>  |  9.0  ----------------------------<  360<H>  3.5   |  22.0  |  0.47<L>    Ca    8.5<L>      28 Apr 2019 07:12  Phos  1.9     04-28  Mg     1.7     04-28    TPro  6.0<L>  /  Alb  2.9<L>  /  TBili  <0.2<L>  /  DBili  x   /  AST  12  /  ALT  14  /  AlkPhos  54  04-28

## 2019-04-29 LAB
ALBUMIN SERPL ELPH-MCNC: 2.4 G/DL — LOW (ref 3.3–5.2)
ALP SERPL-CCNC: 49 U/L — SIGNIFICANT CHANGE UP (ref 40–120)
ALT FLD-CCNC: <5 U/L — SIGNIFICANT CHANGE UP
ANION GAP SERPL CALC-SCNC: 11 MMOL/L — SIGNIFICANT CHANGE UP (ref 5–17)
AST SERPL-CCNC: 8 U/L — SIGNIFICANT CHANGE UP
BASOPHILS # BLD AUTO: 0 K/UL — SIGNIFICANT CHANGE UP (ref 0–0.2)
BILIRUB SERPL-MCNC: <0.2 MG/DL — LOW (ref 0.4–2)
BLASTS # FLD: 1 % — HIGH (ref 0–0)
BUN SERPL-MCNC: 12 MG/DL — SIGNIFICANT CHANGE UP (ref 8–20)
CALCIUM SERPL-MCNC: 8.3 MG/DL — LOW (ref 8.6–10.2)
CHLORIDE SERPL-SCNC: 104 MMOL/L — SIGNIFICANT CHANGE UP (ref 98–107)
CO2 SERPL-SCNC: 22 MMOL/L — SIGNIFICANT CHANGE UP (ref 22–29)
CREAT SERPL-MCNC: 0.45 MG/DL — LOW (ref 0.5–1.3)
EOSINOPHIL # BLD AUTO: 0.1 K/UL — SIGNIFICANT CHANGE UP (ref 0–0.5)
EOSINOPHIL NFR BLD AUTO: 2 % — SIGNIFICANT CHANGE UP (ref 0–5)
GLUCOSE BLDC GLUCOMTR-MCNC: 303 MG/DL — HIGH (ref 70–99)
GLUCOSE BLDC GLUCOMTR-MCNC: 349 MG/DL — HIGH (ref 70–99)
GLUCOSE BLDC GLUCOMTR-MCNC: 357 MG/DL — HIGH (ref 70–99)
GLUCOSE BLDC GLUCOMTR-MCNC: 381 MG/DL — HIGH (ref 70–99)
GLUCOSE SERPL-MCNC: 289 MG/DL — HIGH (ref 70–115)
HCT VFR BLD CALC: 34.8 % — LOW (ref 37–47)
HGB BLD-MCNC: 11.8 G/DL — LOW (ref 12–16)
LYMPHOCYTES # BLD AUTO: 0.8 K/UL — LOW (ref 1–4.8)
LYMPHOCYTES # BLD AUTO: 13 % — LOW (ref 20–55)
MAGNESIUM SERPL-MCNC: 1.8 MG/DL — SIGNIFICANT CHANGE UP (ref 1.6–2.6)
MCHC RBC-ENTMCNC: 30.8 PG — SIGNIFICANT CHANGE UP (ref 27–31)
MCHC RBC-ENTMCNC: 33.9 G/DL — SIGNIFICANT CHANGE UP (ref 32–36)
MCV RBC AUTO: 90.9 FL — SIGNIFICANT CHANGE UP (ref 81–99)
MONOCYTES # BLD AUTO: 0.4 K/UL — SIGNIFICANT CHANGE UP (ref 0–0.8)
MONOCYTES NFR BLD AUTO: 6 % — SIGNIFICANT CHANGE UP (ref 3–10)
NEUTROPHILS # BLD AUTO: 2.1 K/UL — SIGNIFICANT CHANGE UP (ref 1.8–8)
NEUTROPHILS NFR BLD AUTO: 77 % — HIGH (ref 37–73)
PHOSPHATE SERPL-MCNC: 2.8 MG/DL — SIGNIFICANT CHANGE UP (ref 2.4–4.7)
PLAT MORPH BLD: NORMAL — SIGNIFICANT CHANGE UP
PLATELET # BLD AUTO: 169 K/UL — SIGNIFICANT CHANGE UP (ref 150–400)
POTASSIUM SERPL-MCNC: 3.6 MMOL/L — SIGNIFICANT CHANGE UP (ref 3.5–5.3)
POTASSIUM SERPL-SCNC: 3.6 MMOL/L — SIGNIFICANT CHANGE UP (ref 3.5–5.3)
PROT SERPL-MCNC: 5.2 G/DL — LOW (ref 6.6–8.7)
RBC # BLD: 3.83 M/UL — LOW (ref 4.4–5.2)
RBC # FLD: 12.8 % — SIGNIFICANT CHANGE UP (ref 11–15.6)
RBC BLD AUTO: NORMAL — SIGNIFICANT CHANGE UP
SODIUM SERPL-SCNC: 137 MMOL/L — SIGNIFICANT CHANGE UP (ref 135–145)
VARIANT LYMPHS # BLD: 1 % — SIGNIFICANT CHANGE UP (ref 0–6)
WBC # BLD: 3.5 K/UL — LOW (ref 4.8–10.8)
WBC # FLD AUTO: 3.5 K/UL — LOW (ref 4.8–10.8)

## 2019-04-29 PROCEDURE — 99231 SBSQ HOSP IP/OBS SF/LOW 25: CPT

## 2019-04-29 PROCEDURE — 71045 X-RAY EXAM CHEST 1 VIEW: CPT | Mod: 26

## 2019-04-29 PROCEDURE — 99233 SBSQ HOSP IP/OBS HIGH 50: CPT

## 2019-04-29 PROCEDURE — 74018 RADEX ABDOMEN 1 VIEW: CPT | Mod: 26

## 2019-04-29 PROCEDURE — 99232 SBSQ HOSP IP/OBS MODERATE 35: CPT

## 2019-04-29 RX ORDER — ELECTROLYTE SOLUTION,INJ
1 VIAL (ML) INTRAVENOUS
Qty: 0 | Refills: 0 | Status: DISCONTINUED | OUTPATIENT
Start: 2019-04-29 | End: 2019-04-29

## 2019-04-29 RX ORDER — POTASSIUM CHLORIDE 20 MEQ
10 PACKET (EA) ORAL
Qty: 0 | Refills: 0 | Status: COMPLETED | OUTPATIENT
Start: 2019-04-29 | End: 2019-04-29

## 2019-04-29 RX ORDER — MAGNESIUM SULFATE 500 MG/ML
2 VIAL (ML) INJECTION ONCE
Qty: 0 | Refills: 0 | Status: COMPLETED | OUTPATIENT
Start: 2019-04-29 | End: 2019-04-29

## 2019-04-29 RX ORDER — I.V. FAT EMULSION 20 G/100ML
0.88 EMULSION INTRAVENOUS
Qty: 50 | Refills: 0 | Status: DISCONTINUED | OUTPATIENT
Start: 2019-04-29 | End: 2019-04-29

## 2019-04-29 RX ORDER — CHLORHEXIDINE GLUCONATE 213 G/1000ML
15 SOLUTION TOPICAL
Qty: 0 | Refills: 0 | Status: DISCONTINUED | OUTPATIENT
Start: 2019-04-29 | End: 2019-05-04

## 2019-04-29 RX ADMIN — Medication 10: at 12:21

## 2019-04-29 RX ADMIN — Medication 50 GRAM(S): at 11:17

## 2019-04-29 RX ADMIN — Medication 100 MILLIEQUIVALENT(S): at 06:20

## 2019-04-29 RX ADMIN — Medication 100 MILLIEQUIVALENT(S): at 10:03

## 2019-04-29 RX ADMIN — SODIUM CHLORIDE 75 MILLILITER(S): 9 INJECTION INTRAMUSCULAR; INTRAVENOUS; SUBCUTANEOUS at 12:22

## 2019-04-29 RX ADMIN — Medication 100 MILLIEQUIVALENT(S): at 08:04

## 2019-04-29 RX ADMIN — Medication 1 EACH: at 23:55

## 2019-04-29 RX ADMIN — CHLORHEXIDINE GLUCONATE 15 MILLILITER(S): 213 SOLUTION TOPICAL at 18:06

## 2019-04-29 RX ADMIN — I.V. FAT EMULSION 31.68 GM/KG/DAY: 20 EMULSION INTRAVENOUS at 23:55

## 2019-04-29 RX ADMIN — PANTOPRAZOLE SODIUM 10 MG/HR: 20 TABLET, DELAYED RELEASE ORAL at 12:22

## 2019-04-29 RX ADMIN — Medication 8: at 06:06

## 2019-04-29 RX ADMIN — Medication 8: at 00:24

## 2019-04-29 RX ADMIN — Medication 10: at 17:12

## 2019-04-29 RX ADMIN — CHLORHEXIDINE GLUCONATE 1 APPLICATION(S): 213 SOLUTION TOPICAL at 06:06

## 2019-04-29 NOTE — PROGRESS NOTE ADULT - ASSESSMENT
FERNANDES resolved.  Large DU.  Path negative.  No gastric biopsies.    TPN tolerated with high glucoses.  Will Decrease Glucose to 300 gm/ day and increase insulin to 20 units/ bag.  TPN reordered with lipids.    Will start CHO restricted clears.  PPI drip continues.

## 2019-04-29 NOTE — PROCEDURE NOTE - NSPROCNAME_GEN_A_CORE
Gastric Intubation/Gastric Lavage
PICC Line Insertion

## 2019-04-29 NOTE — PROGRESS NOTE ADULT - SUBJECTIVE AND OBJECTIVE BOX
FOLLOW UP VISIT, ongoing [facility]    OVERNIGHT EVENTS:  No acute events overnight per RN. Pending NGT reinsertion per surgical resident.   Seen and examined at bedside earlier. Pt is oob to chair, sleeping opens eyes to verbal stimuli but falls back to sleep. Granddaughter present.     Unable to perform ROS due to lethargy.    MEDICATIONS  (STANDING):  chlorhexidine 2% Cloths 1 Application(s) Topical <User Schedule>  dextrose 5%. 1000 milliLiter(s) (50 mL/Hr) IV Continuous <Continuous>  dextrose 50% Injectable 12.5 Gram(s) IV Push once  dextrose 50% Injectable 25 Gram(s) IV Push once  dextrose 50% Injectable 25 Gram(s) IV Push once  fat emulsion (Plant Based) 20% Infusion 0.88 Gm/kG/Day (31.68 mL/Hr) IV Continuous <Continuous>  insulin lispro (HumaLOG) corrective regimen sliding scale   SubCutaneous every 6 hours  pantoprazole Infusion 8 mG/Hr (10 mL/Hr) IV Continuous <Continuous>  Parenteral Nutrition - Adult 1 Each (83 mL/Hr) TPN Continuous <Continuous>  Parenteral Nutrition - Adult 1 Each (83 mL/Hr) TPN Continuous <Continuous>  sodium chloride 0.9%. 1000 milliLiter(s) (75 mL/Hr) IV Continuous <Continuous>    MEDICATIONS  (PRN):  dextrose 40% Gel 15 Gram(s) Oral once PRN Blood Glucose LESS THAN 70 milliGRAM(s)/deciliter  glucagon  Injectable 1 milliGRAM(s) IntraMuscular once PRN Glucose LESS THAN 70 milligrams/deciliter  ondansetron Injectable 4 milliGRAM(s) IV Push every 6 hours PRN Nausea and/or Vomiting  sodium chloride 0.9% lock flush 10 milliLiter(s) IV Push every 1 hour PRN Pre/post blood products, medications, blood draw, and to maintain line patency      PHYSICAL EXAM:    Vital Signs Last 24 Hrs  T(C): 37.1 (29 Apr 2019 15:38), Max: 37.8 (28 Apr 2019 17:34)  T(F): 98.7 (29 Apr 2019 15:38), Max: 100.1 (28 Apr 2019 17:34)  HR: 99 (29 Apr 2019 15:38) (73 - 99)  BP: 164/85 (29 Apr 2019 15:38) (119/60 - 164/85)  BP(mean): --  RR: 18 (29 Apr 2019 15:38) (18 - 18)  SpO2: 95% (29 Apr 2019 07:10) (94% - 96%)    General: elderly. No acute distress.   HEENT: mucous membrane moist    Lungs: clear to auscultation bilaterally.   non-labored.   CV: +s1/s2. Regular rate and rhythm.   GI: hypoactive bowel sound. abdomen soft, non-tender, distended   MSK: Moves all 4 extremities.  weakness.    Neuro: Opens eye to verbal stimuli. unable to engage in significant conversation.   Skin: warm and dry.      LABS:                          11.8   3.5   )-----------( 169      ( 29 Apr 2019 04:27 )             34.8     04-29    137  |  104  |  12.0  ----------------------------<  289<H>  3.6   |  22.0  |  0.45<L>    Ca    8.3<L>      29 Apr 2019 04:27  Phos  2.8     04-29  Mg     1.8     04-29    TPro  5.2<L>  /  Alb  2.4<L>  /  TBili  <0.2<L>  /  DBili  x   /  AST  8   /  ALT  <5  /  AlkPhos  49  04-29    I&O's Summary    RADIOLOGY & ADDITIONAL STUDIES:     KUB 4/29/19  FINDINGS:  Oral contrast is noted in the right colon. No evidence of of ileus or   obstruction. No air-fluid levels. No acute osseous abnormality. CT is more sensitive.   IMPRESSION:  Nonobstructive bowel gas pattern.      ADVANCE DIRECTIVES:   DNR YES NO  Completed on:     4/25/19                MOLST  YES NO   Completed on:   4/25/19  Living Will  YES NO   Completed on:

## 2019-04-29 NOTE — PROGRESS NOTE ADULT - SUBJECTIVE AND OBJECTIVE BOX
INTERVAL HPI/OVERNIGHT EVENTS:    No acute overnight events reported. Patient afebrile overnight, labs grossly normal, patient nontachy, hemodynamically stable. Moderate distention of the abdomen noted. Patient receiving TPN per GI, now with 5 units of lantus for better glycemic control.       MEDICATIONS  (STANDING):  chlorhexidine 2% Cloths 1 Application(s) Topical <User Schedule>  dextrose 5%. 1000 milliLiter(s) (50 mL/Hr) IV Continuous <Continuous>  dextrose 50% Injectable 12.5 Gram(s) IV Push once  dextrose 50% Injectable 25 Gram(s) IV Push once  dextrose 50% Injectable 25 Gram(s) IV Push once  fat emulsion (Plant Based) 20% Infusion 0.88 Gm/kG/Day (31.68 mL/Hr) IV Continuous <Continuous>  insulin lispro (HumaLOG) corrective regimen sliding scale   SubCutaneous every 6 hours  pantoprazole Infusion 8 mG/Hr (10 mL/Hr) IV Continuous <Continuous>  Parenteral Nutrition - Adult 1 Each (83 mL/Hr) TPN Continuous <Continuous>  Parenteral Nutrition - Adult 1 Each (83 mL/Hr) TPN Continuous <Continuous>  sodium chloride 0.9%. 1000 milliLiter(s) (75 mL/Hr) IV Continuous <Continuous>    MEDICATIONS  (PRN):  dextrose 40% Gel 15 Gram(s) Oral once PRN Blood Glucose LESS THAN 70 milliGRAM(s)/deciliter  glucagon  Injectable 1 milliGRAM(s) IntraMuscular once PRN Glucose LESS THAN 70 milligrams/deciliter  ondansetron Injectable 4 milliGRAM(s) IV Push every 6 hours PRN Nausea and/or Vomiting  sodium chloride 0.9% lock flush 10 milliLiter(s) IV Push every 1 hour PRN Pre/post blood products, medications, blood draw, and to maintain line patency      Vital Signs Last 24 Hrs  T(C): 37.1 (29 Apr 2019 07:10), Max: 37.8 (28 Apr 2019 17:34)  T(F): 98.8 (29 Apr 2019 07:10), Max: 100.1 (28 Apr 2019 17:34)  HR: 90 (29 Apr 2019 07:10) (73 - 94)  BP: 161/75 (29 Apr 2019 07:10) (119/60 - 161/75)  BP(mean): --  RR: 18 (29 Apr 2019 07:10) (18 - 18)  SpO2: 95% (29 Apr 2019 07:10) (94% - 96%)    PE  Gen: Not in acute distress  Pulm: nonlabored breathing  CV: S1, S2  Abd: distention, tympanic to percussion   Ext: No pitting edema B/L   Vasc: 2+ radial and PT pulses B/L      I&O's Detail      LABS:                        11.8   3.5   )-----------( 169      ( 29 Apr 2019 04:27 )             34.8     04-29    137  |  104  |  12.0  ----------------------------<  289<H>  3.6   |  22.0  |  0.45<L>    Ca    8.3<L>      29 Apr 2019 04:27  Phos  2.8     04-29  Mg     1.8     04-29    TPro  5.2<L>  /  Alb  2.4<L>  /  TBili  <0.2<L>  /  DBili  x   /  AST  8   /  ALT  <5  /  AlkPhos  49  04-29          RADIOLOGY & ADDITIONAL STUDIES:

## 2019-04-29 NOTE — PROGRESS NOTE ADULT - ASSESSMENT
Mrs. Rincon is a 89F with dementia and DM2 admitted with gastric outlet obstruction. S/P EGD on 4/22 with erosive esophagitis and multiple gastric ulcers. Pt is NPO, receiving nutrition via TPN.      PLAN    Gastric Outlet Obstruction  - pulled out NGT multiple times, plan per surgery, for reinsertion of NGT today for continued decompression   - c/w TPN via PICC for nutrition per GI     Dementia  - requires full assist ADLs at baseline  - c/w supportive care, frequent reorientation    DM2  - poorly controlled hyperglycemia; likely contributed by TPN  - c/w insulin sliding scale per primary team    Palliative Care Encounter  GOCs discussed by palliative team earlier this admission, plan is to continue current medical interventions. NGT placement today per kusum, will likely need protective wrist restraints as pt pulled out NGT earlier this admission. Advance directives and MOLST completed 4/25, DNR/DNI. Will continue to follow along.

## 2019-04-29 NOTE — PROCEDURE NOTE - NSPROCDETAILS_GEN_ALL_CORE
placement confirmed by auscultation/nasogastric/connected to suction/gastric secretions aspirated, placement confirmed
ultrasound guidance/supine position/ultrasound assessment/sterile dressing applied/location identified, draped/prepped, sterile technique used/sterile technique, catheter placed
audible air bolus/gastric secretions aspirated, placement confirmed/nasogastric/connected to suction
nasogastric
nasogastric/audible air bolus/gastric secretions aspirated, placement confirmed/connected to suction

## 2019-04-29 NOTE — PROCEDURE NOTE - NSINDICATIONS_GEN_A_CORE
intestinal obstruction
Gastric Outlet obstruction/intestinal obstruction
Pyloric obstruction/intestinal obstruction
intestinal obstruction
Total Parenteral Nutrition/TPN

## 2019-04-29 NOTE — PROGRESS NOTE ADULT - SUBJECTIVE AND OBJECTIVE BOX
Patient seen and examined; chart reviewed;  No fever for the past 18 hr.  T max 101 yesterday.  Looks good.  TPN infusing.  Glucoses running 285 to 349.  5 units insulin in TPN bag.  Still NPO.  No BM's  No bleeding.  Abdomen is flat.  Daughter at bedside provides information.      PAST MEDICAL & SURGICAL HISTORY:  Arthritis  Dementia      ROS:  No Heartburn, regurgitation, dysphagia, odynophagia.  No dyspepsia  No abdominal pain.    No Nausea, vomiting.  No Bleeding.  No hematemesis.   No diarrhea.    No hematochesia.  No weight loss, anorexia.  No edema.      MEDICATIONS  (STANDING):  chlorhexidine 2% Cloths 1 Application(s) Topical <User Schedule>  dextrose 5%. 1000 milliLiter(s) (50 mL/Hr) IV Continuous <Continuous>  dextrose 50% Injectable 12.5 Gram(s) IV Push once  dextrose 50% Injectable 25 Gram(s) IV Push once  dextrose 50% Injectable 25 Gram(s) IV Push once  fat emulsion (Plant Based) 20% Infusion 0.88 Gm/kG/Day (31.68 mL/Hr) IV Continuous <Continuous>  insulin lispro (HumaLOG) corrective regimen sliding scale   SubCutaneous every 6 hours  magnesium sulfate  IVPB 2 Gram(s) IV Intermittent once  pantoprazole Infusion 8 mG/Hr (10 mL/Hr) IV Continuous <Continuous>  Parenteral Nutrition - Adult 1 Each (83 mL/Hr) TPN Continuous <Continuous>  Parenteral Nutrition - Adult 1 Each (83 mL/Hr) TPN Continuous <Continuous>  potassium chloride  10 mEq/100 mL IVPB 10 milliEquivalent(s) IV Intermittent every 1 hour  sodium chloride 0.9%. 1000 milliLiter(s) (75 mL/Hr) IV Continuous <Continuous>    MEDICATIONS  (PRN):  dextrose 40% Gel 15 Gram(s) Oral once PRN Blood Glucose LESS THAN 70 milliGRAM(s)/deciliter  glucagon  Injectable 1 milliGRAM(s) IntraMuscular once PRN Glucose LESS THAN 70 milligrams/deciliter  ondansetron Injectable 4 milliGRAM(s) IV Push every 6 hours PRN Nausea and/or Vomiting  sodium chloride 0.9% lock flush 10 milliLiter(s) IV Push every 1 hour PRN Pre/post blood products, medications, blood draw, and to maintain line patency      Allergies    No Known Allergies    Intolerances        Vital Signs Last 24 Hrs  T(C): 37.1 (29 Apr 2019 07:10), Max: 38.3 (28 Apr 2019 11:00)  T(F): 98.8 (29 Apr 2019 07:10), Max: 101 (28 Apr 2019 11:00)  HR: 90 (29 Apr 2019 07:10) (73 - 102)  BP: 161/75 (29 Apr 2019 07:10) (119/60 - 161/75)  BP(mean): --  RR: 18 (29 Apr 2019 07:10) (18 - 20)  SpO2: 95% (29 Apr 2019 07:10) (94% - 96%)    PHYSICAL EXAM:    GENERAL: NAD, well-groomed, well-developed  HEAD:  Atraumatic, Normocephalic  EYES: EOMI, PERRLA, conjunctiva and sclera clear  ENMT: No tonsillar erythema, exudates, or enlargement; Moist mucous membranes, Good dentition, No lesions  NECK: Supple, No JVD, Normal thyroid  CHEST/LUNG: Clear to percussion bilaterally; No rales, rhonchi, wheezing, or rubs  HEART: Regular rate and rhythm; No murmurs, rubs, or gallops  ABDOMEN: Soft, Nontender, Nondistended; Bowel sounds present;  No HSM.  No MTR.    EXTREMITIES:  2+ Peripheral Pulses, No clubbing, cyanosis, or edema;  RUE PICC line: intact.    LYMPH: No lymphadenopathy noted  SKIN: No rashes or lesions      LABS:                        11.8   3.5   )-----------( 169      ( 29 Apr 2019 04:27 )             34.8     04-29    137  |  104  |  12.0  ----------------------------<  289<H>  3.6   |  22.0  |  0.45<L>    Ca    8.3<L>      29 Apr 2019 04:27  Phos  2.8     04-29  Mg     1.8     04-29    TPro  5.2<L>  /  Alb  2.4<L>  /  TBili  <0.2<L>  /  DBili  x   /  AST  8   /  ALT  <5  /  AlkPhos  49  04-29             RADIOLOGY & ADDITIONAL STUDIES:

## 2019-04-29 NOTE — PROGRESS NOTE ADULT - ASSESSMENT
A/P: 89yoF with GOO 2/2 duodenal ulcer. HDS, VSS    - NGT placement today for abdominal distention   - Continue TPN with GI for now  - possible rescope 5/2  - family wants no surgical intervention  - B/L writs restraints for NGT   - dispo: continue inpt care at this time

## 2019-04-29 NOTE — PROCEDURE NOTE - NSPOSTPRCRAD_GEN_A_CORE
post-procedure radiography performed
postion of catheter/line adjusted to depth of insertion/ultrasound/depth of insertion/line in appropriate postion
post-procedure radiography performed
Pending KUB for placement/post-procedure radiography performed
pending CXR

## 2019-04-30 LAB
ANION GAP SERPL CALC-SCNC: 11 MMOL/L — SIGNIFICANT CHANGE UP (ref 5–17)
BASOPHILS # BLD AUTO: 0 K/UL — SIGNIFICANT CHANGE UP (ref 0–0.2)
BASOPHILS NFR BLD AUTO: 0.2 % — SIGNIFICANT CHANGE UP (ref 0–2)
BUN SERPL-MCNC: 11 MG/DL — SIGNIFICANT CHANGE UP (ref 8–20)
CALCIUM SERPL-MCNC: 8.1 MG/DL — LOW (ref 8.6–10.2)
CHLORIDE SERPL-SCNC: 102 MMOL/L — SIGNIFICANT CHANGE UP (ref 98–107)
CO2 SERPL-SCNC: 21 MMOL/L — LOW (ref 22–29)
CREAT SERPL-MCNC: 0.36 MG/DL — LOW (ref 0.5–1.3)
EOSINOPHIL # BLD AUTO: 0.1 K/UL — SIGNIFICANT CHANGE UP (ref 0–0.5)
EOSINOPHIL NFR BLD AUTO: 2 % — SIGNIFICANT CHANGE UP (ref 0–6)
GLUCOSE BLDC GLUCOMTR-MCNC: 213 MG/DL — HIGH (ref 70–99)
GLUCOSE BLDC GLUCOMTR-MCNC: 269 MG/DL — HIGH (ref 70–99)
GLUCOSE BLDC GLUCOMTR-MCNC: 272 MG/DL — HIGH (ref 70–99)
GLUCOSE BLDC GLUCOMTR-MCNC: 273 MG/DL — HIGH (ref 70–99)
GLUCOSE BLDC GLUCOMTR-MCNC: 300 MG/DL — HIGH (ref 70–99)
GLUCOSE SERPL-MCNC: 314 MG/DL — HIGH (ref 70–115)
HCT VFR BLD CALC: 36 % — LOW (ref 37–47)
HGB BLD-MCNC: 12 G/DL — SIGNIFICANT CHANGE UP (ref 12–16)
LYMPHOCYTES # BLD AUTO: 1.2 K/UL — SIGNIFICANT CHANGE UP (ref 1–4.8)
LYMPHOCYTES # BLD AUTO: 26 % — SIGNIFICANT CHANGE UP (ref 20–55)
MAGNESIUM SERPL-MCNC: 1.8 MG/DL — SIGNIFICANT CHANGE UP (ref 1.6–2.6)
MCHC RBC-ENTMCNC: 30.3 PG — SIGNIFICANT CHANGE UP (ref 27–31)
MCHC RBC-ENTMCNC: 33.3 G/DL — SIGNIFICANT CHANGE UP (ref 32–36)
MCV RBC AUTO: 90.9 FL — SIGNIFICANT CHANGE UP (ref 81–99)
MONOCYTES # BLD AUTO: 0.5 K/UL — SIGNIFICANT CHANGE UP (ref 0–0.8)
MONOCYTES NFR BLD AUTO: 10.6 % — HIGH (ref 3–10)
NEUTROPHILS # BLD AUTO: 2.7 K/UL — SIGNIFICANT CHANGE UP (ref 1.8–8)
NEUTROPHILS NFR BLD AUTO: 60.7 % — SIGNIFICANT CHANGE UP (ref 37–73)
PHOSPHATE SERPL-MCNC: 2.5 MG/DL — SIGNIFICANT CHANGE UP (ref 2.4–4.7)
PLATELET # BLD AUTO: 170 K/UL — SIGNIFICANT CHANGE UP (ref 150–400)
POTASSIUM SERPL-MCNC: 3.7 MMOL/L — SIGNIFICANT CHANGE UP (ref 3.5–5.3)
POTASSIUM SERPL-SCNC: 3.7 MMOL/L — SIGNIFICANT CHANGE UP (ref 3.5–5.3)
RBC # BLD: 3.96 M/UL — LOW (ref 4.4–5.2)
RBC # FLD: 13 % — SIGNIFICANT CHANGE UP (ref 11–15.6)
SODIUM SERPL-SCNC: 134 MMOL/L — LOW (ref 135–145)
WBC # BLD: 4.4 K/UL — LOW (ref 4.8–10.8)
WBC # FLD AUTO: 4.4 K/UL — LOW (ref 4.8–10.8)

## 2019-04-30 PROCEDURE — 74018 RADEX ABDOMEN 1 VIEW: CPT | Mod: 26

## 2019-04-30 PROCEDURE — 74018 RADEX ABDOMEN 1 VIEW: CPT | Mod: 26,77

## 2019-04-30 PROCEDURE — 99231 SBSQ HOSP IP/OBS SF/LOW 25: CPT

## 2019-04-30 PROCEDURE — 99233 SBSQ HOSP IP/OBS HIGH 50: CPT

## 2019-04-30 PROCEDURE — 99232 SBSQ HOSP IP/OBS MODERATE 35: CPT

## 2019-04-30 RX ORDER — PANTOPRAZOLE SODIUM 20 MG/1
40 TABLET, DELAYED RELEASE ORAL EVERY 12 HOURS
Qty: 0 | Refills: 0 | Status: DISCONTINUED | OUTPATIENT
Start: 2019-04-30 | End: 2019-05-03

## 2019-04-30 RX ORDER — I.V. FAT EMULSION 20 G/100ML
0.88 EMULSION INTRAVENOUS
Qty: 50 | Refills: 0 | Status: DISCONTINUED | OUTPATIENT
Start: 2019-04-30 | End: 2019-04-30

## 2019-04-30 RX ORDER — ENOXAPARIN SODIUM 100 MG/ML
40 INJECTION SUBCUTANEOUS DAILY
Qty: 0 | Refills: 0 | Status: DISCONTINUED | OUTPATIENT
Start: 2019-04-30 | End: 2019-05-01

## 2019-04-30 RX ORDER — ELECTROLYTE SOLUTION,INJ
1 VIAL (ML) INTRAVENOUS
Qty: 0 | Refills: 0 | Status: DISCONTINUED | OUTPATIENT
Start: 2019-04-30 | End: 2019-04-30

## 2019-04-30 RX ADMIN — PANTOPRAZOLE SODIUM 10 MG/HR: 20 TABLET, DELAYED RELEASE ORAL at 00:30

## 2019-04-30 RX ADMIN — Medication 4: at 23:20

## 2019-04-30 RX ADMIN — Medication 1 EACH: at 18:39

## 2019-04-30 RX ADMIN — I.V. FAT EMULSION 31.68 GM/KG/DAY: 20 EMULSION INTRAVENOUS at 18:39

## 2019-04-30 RX ADMIN — Medication 6: at 06:03

## 2019-04-30 RX ADMIN — Medication 6: at 12:45

## 2019-04-30 RX ADMIN — Medication 6: at 18:41

## 2019-04-30 RX ADMIN — PANTOPRAZOLE SODIUM 40 MILLIGRAM(S): 20 TABLET, DELAYED RELEASE ORAL at 18:42

## 2019-04-30 RX ADMIN — ENOXAPARIN SODIUM 40 MILLIGRAM(S): 100 INJECTION SUBCUTANEOUS at 12:45

## 2019-04-30 RX ADMIN — CHLORHEXIDINE GLUCONATE 15 MILLILITER(S): 213 SOLUTION TOPICAL at 06:02

## 2019-04-30 RX ADMIN — CHLORHEXIDINE GLUCONATE 15 MILLILITER(S): 213 SOLUTION TOPICAL at 18:42

## 2019-04-30 RX ADMIN — SODIUM CHLORIDE 75 MILLILITER(S): 9 INJECTION INTRAMUSCULAR; INTRAVENOUS; SUBCUTANEOUS at 00:55

## 2019-04-30 RX ADMIN — Medication 6: at 00:11

## 2019-04-30 RX ADMIN — CHLORHEXIDINE GLUCONATE 1 APPLICATION(S): 213 SOLUTION TOPICAL at 06:01

## 2019-04-30 NOTE — PROGRESS NOTE ADULT - SUBJECTIVE AND OBJECTIVE BOX
Patient seen and examined; chart reviewed.  Mgmt discussed with nursing.  Had advised DC of the IVF as pt is receiving 2.5 liters/ day of tpn.  Glucoses still running [id number] to 303 range with extra insulina nd reduced Glucose.  Known diabetic.  NGT replaced yesterday.  Minimal effluent.  Known with large DU but no obstruction at pylorus.        PAST MEDICAL & SURGICAL HISTORY:  Arthritis  Dementia      ROS:  No Heartburn, regurgitation, dysphagia, odynophagia.  No dyspepsia  No abdominal pain.    No Nausea, vomiting.  No Bleeding.  No hematemesis.   No diarrhea.    No hematochesia.  No weight loss, anorexia.  No edema.      MEDICATIONS  (STANDING):  chlorhexidine 0.12% Liquid 15 milliLiter(s) Swish and Spit two times a day  chlorhexidine 2% Cloths 1 Application(s) Topical <User Schedule>  dextrose 5%. 1000 milliLiter(s) (50 mL/Hr) IV Continuous <Continuous>  dextrose 50% Injectable 12.5 Gram(s) IV Push once  dextrose 50% Injectable 25 Gram(s) IV Push once  dextrose 50% Injectable 25 Gram(s) IV Push once  enoxaparin Injectable 40 milliGRAM(s) SubCutaneous daily  fat emulsion (Plant Based) 20% Infusion 0.88 Gm/kG/Day (31.68 mL/Hr) IV Continuous <Continuous>  insulin lispro (HumaLOG) corrective regimen sliding scale   SubCutaneous every 6 hours  pantoprazole  Injectable 40 milliGRAM(s) IV Push every 12 hours  Parenteral Nutrition - Adult 1 Each (83 mL/Hr) TPN Continuous <Continuous>  Parenteral Nutrition - Adult 1 Each (83 mL/Hr) TPN Continuous <Continuous>    MEDICATIONS  (PRN):  dextrose 40% Gel 15 Gram(s) Oral once PRN Blood Glucose LESS THAN 70 milliGRAM(s)/deciliter  glucagon  Injectable 1 milliGRAM(s) IntraMuscular once PRN Glucose LESS THAN 70 milligrams/deciliter  ondansetron Injectable 4 milliGRAM(s) IV Push every 6 hours PRN Nausea and/or Vomiting  sodium chloride 0.9% lock flush 10 milliLiter(s) IV Push every 1 hour PRN Pre/post blood products, medications, blood draw, and to maintain line patency      Allergies    No Known Allergies    Intolerances        Vital Signs Last 24 Hrs  T(C): 36.9 (30 Apr 2019 08:47), Max: 37.1 (29 Apr 2019 15:38)  T(F): 98.4 (30 Apr 2019 08:47), Max: 98.8 (29 Apr 2019 19:32)  HR: 87 (30 Apr 2019 08:47) (87 - 100)  BP: 158/80 (30 Apr 2019 08:47) (147/78 - 164/85)  BP(mean): --  RR: 18 (30 Apr 2019 08:47) (18 - 19)  SpO2: 98% (29 Apr 2019 23:42) (98% - 98%)    PHYSICAL EXAM:    GENERAL: NAD, well-groomed, well-developed  HEAD:  Atraumatic, Normocephalic  EYES: EOMI, PERRLA, conjunctiva and sclera clear  ENMT: No tonsillar erythema, exudates, or enlargement; Moist mucous membranes, Good dentition, No lesions  NECK: Supple, No JVD, Normal thyroid  CHEST/LUNG: Clear to percussion bilaterally; No rales, rhonchi, wheezing, or rubs  HEART: Regular rate and rhythm; No murmurs, rubs, or gallops  ABDOMEN: Soft, Nontender, Nondistended; Bowel sounds present  EXTREMITIES:  2+ Peripheral Pulses, No clubbing, cyanosis, or edema  LYMPH: No lymphadenopathy noted  SKIN: No rashes or lesions      LABS:                        12.0   4.4   )-----------( 170      ( 30 Apr 2019 07:14 )             36.0     04-30    134<L>  |  102  |  11.0  ----------------------------<  314<H>  3.7   |  21.0<L>  |  0.36<L>    Ca    8.1<L>      30 Apr 2019 07:14  Phos  2.5     04-30  Mg     1.8     04-30    TPro  5.2<L>  /  Alb  2.4<L>  /  TBili  <0.2<L>  /  DBili  x   /  AST  8   /  ALT  <5  /  AlkPhos  49  04-29             RADIOLOGY & ADDITIONAL STUDIES:

## 2019-04-30 NOTE — PROGRESS NOTE ADULT - SUBJECTIVE AND OBJECTIVE BOX
FOLLOW UP VISIT, ongoing [facility]    OVERNIGHT EVENTS:  Seen and examined at bedside, son present. SUSANA Bernard assisted with [place] translation. S/P NGT placement yesterday with minimal output. Gastrograffin challenge ongoing per surgery team.      Unable to perform ROS due to lethargy.    MEDICATIONS  (STANDING):  chlorhexidine 0.12% Liquid 15 milliLiter(s) Swish and Spit two times a day  chlorhexidine 2% Cloths 1 Application(s) Topical <User Schedule>  dextrose 5%. 1000 milliLiter(s) (50 mL/Hr) IV Continuous <Continuous>  dextrose 50% Injectable 12.5 Gram(s) IV Push once  dextrose 50% Injectable 25 Gram(s) IV Push once  dextrose 50% Injectable 25 Gram(s) IV Push once  enoxaparin Injectable 40 milliGRAM(s) SubCutaneous daily  fat emulsion (Plant Based) 20% Infusion 0.88 Gm/kG/Day (31.68 mL/Hr) IV Continuous <Continuous>  insulin lispro (HumaLOG) corrective regimen sliding scale   SubCutaneous every 6 hours  pantoprazole  Injectable 40 milliGRAM(s) IV Push every 12 hours  Parenteral Nutrition - Adult 1 Each (83 mL/Hr) TPN Continuous <Continuous>  Parenteral Nutrition - Adult 1 Each (83 mL/Hr) TPN Continuous <Continuous>    MEDICATIONS  (PRN):  dextrose 40% Gel 15 Gram(s) Oral once PRN Blood Glucose LESS THAN 70 milliGRAM(s)/deciliter  glucagon  Injectable 1 milliGRAM(s) IntraMuscular once PRN Glucose LESS THAN 70 milligrams/deciliter  ondansetron Injectable 4 milliGRAM(s) IV Push every 6 hours PRN Nausea and/or Vomiting  sodium chloride 0.9% lock flush 10 milliLiter(s) IV Push every 1 hour PRN Pre/post blood products, medications, blood draw, and to maintain line patency    PHYSICAL EXAM:  Vital Signs Last 24 Hrs  T(C): 36.9 (30 Apr 2019 08:47), Max: 37.1 (29 Apr 2019 15:38)  T(F): 98.4 (30 Apr 2019 08:47), Max: 98.8 (29 Apr 2019 19:32)  HR: 87 (30 Apr 2019 08:47) (87 - 100)  BP: 158/80 (30 Apr 2019 08:47) (147/78 - 164/85)  BP(mean): --  RR: 18 (30 Apr 2019 08:47) (18 - 19)  SpO2: 98% (29 Apr 2019 23:42) (98% - 98%)    General: thin, cachetic. No acute distress.   HEENT: mucous membrane moist    Lungs: CTAB. no rales, rhonchi, wheezing.  non-labored.   CV: +s1/s2. Regular rate and rhythm.   GI: hypoactive bowel sound. abdomen soft, non-tender, distended   MSK: Moves all 4 extremities.  weakness.    Neuro: Opens eyes to verbal stimuli but falls quickly back to sleep.  Skin: warm and dry.      LABS:                        12.0   4.4   )-----------( 170      ( 30 Apr 2019 07:14 )             36.0   04-30    134<L>  |  102  |  11.0  ----------------------------<  314<H>  3.7   |  21.0<L>  |  0.36<L>    Ca    8.1<L>      30 Apr 2019 07:14  Phos  2.5     04-30  Mg     1.8     04-30    TPro  5.2<L>  /  Alb  2.4<L>  /  TBili  <0.2<L>  /  DBili  x   /  AST  8   /  ALT  <5  /  AlkPhos  49  04-29    I&O's Summary    29 Apr 2019 07:01 - 30 Apr 2019 07:00  --------------------------------------------------------  IN: 1680 mL / OUT: 0 mL / NET: 1680 mL    30 Apr 2019 07:01  -  30 Apr 2019 13:28  --------------------------------------------------------  IN: 595 mL / OUT: 0 mL / NET: 595 mL      RADIOLOGY & ADDITIONAL STUDIES:     KUB 4/30/19  FINDINGS:  There is nonspecific nonobstructive bowel gas pattern.  There is no free   air. Nasogastric tube is present tip in left upper quadrant, region of   stomach. Residual oral contrast in the colon    There are degenerative changes of thoracolumbar spine.     IMPRESSION:  Nonobstructive but nonspecific bowel gas pattern.      ADVANCE DIRECTIVES:   DNR YES NO  Completed on:     4/25/19                MOLST  YES NO   Completed on:   4/25/19  Living Will  YES NO   Completed on:

## 2019-04-30 NOTE — PROGRESS NOTE ADULT - SUBJECTIVE AND OBJECTIVE BOX
INTERVAL HPI/OVERNIGHT EVENTS:    SUBJECTIVE:  No acute events overnight. NGT is in and placement confirmed with CXR; flushed. But output is minimal overnight, and abd is still somewhat distended.  Gastrograffin challenge given today; Follow up KUBs ordered for 1 hr and 6 hours to see if gastro passes through to small bowel.  Fingersticks better controlled in 200s today after TPN bag with increased insulin (20 lantus) begun yesterday with sliding scale continuing    MEDICATIONS  (STANDING):  chlorhexidine 0.12% Liquid 15 milliLiter(s) Swish and Spit two times a day  chlorhexidine 2% Cloths 1 Application(s) Topical <User Schedule>  dextrose 5%. 1000 milliLiter(s) (50 mL/Hr) IV Continuous <Continuous>  dextrose 50% Injectable 12.5 Gram(s) IV Push once  dextrose 50% Injectable 25 Gram(s) IV Push once  dextrose 50% Injectable 25 Gram(s) IV Push once  enoxaparin Injectable 40 milliGRAM(s) SubCutaneous daily  fat emulsion (Plant Based) 20% Infusion 0.88 Gm/kG/Day (31.68 mL/Hr) IV Continuous <Continuous>  insulin lispro (HumaLOG) corrective regimen sliding scale   SubCutaneous every 6 hours  pantoprazole  Injectable 40 milliGRAM(s) IV Push every 12 hours  Parenteral Nutrition - Adult 1 Each (83 mL/Hr) TPN Continuous <Continuous>  Parenteral Nutrition - Adult 1 Each (83 mL/Hr) TPN Continuous <Continuous>    MEDICATIONS  (PRN):  dextrose 40% Gel 15 Gram(s) Oral once PRN Blood Glucose LESS THAN 70 milliGRAM(s)/deciliter  glucagon  Injectable 1 milliGRAM(s) IntraMuscular once PRN Glucose LESS THAN 70 milligrams/deciliter  ondansetron Injectable 4 milliGRAM(s) IV Push every 6 hours PRN Nausea and/or Vomiting  sodium chloride 0.9% lock flush 10 milliLiter(s) IV Push every 1 hour PRN Pre/post blood products, medications, blood draw, and to maintain line patency      Vital Signs Last 24 Hrs  T(C): 36.9 (30 Apr 2019 08:47), Max: 37.1 (29 Apr 2019 15:38)  T(F): 98.4 (30 Apr 2019 08:47), Max: 98.8 (29 Apr 2019 19:32)  HR: 87 (30 Apr 2019 08:47) (87 - 100)  BP: 158/80 (30 Apr 2019 08:47) (147/78 - 164/85)  BP(mean): --  RR: 18 (30 Apr 2019 08:47) (18 - 19)  SpO2: 98% (29 Apr 2019 23:42) (98% - 98%)    PE  Gen: Not in acute distress  Pulm: nonlabored breathing  CV: S1, S2  Abd: distention, tympanic to percussion   Ext: No pitting edema B/L   Vasc: 2+ radial and PT pulses B/L        I&O's Detail    29 Apr 2019 07:01  -  30 Apr 2019 07:00  --------------------------------------------------------  IN:    pantoprazole Infusion: 100 mL    sodium chloride 0.9%: 750 mL    TPN (Total Parenteral Nutrition): 830 mL  Total IN: 1680 mL    OUT:  Total OUT: 0 mL    Total NET: 1680 mL          LABS:                        12.0   4.4   )-----------( 170      ( 30 Apr 2019 07:14 )             36.0     04-30    134<L>  |  102  |  11.0  ----------------------------<  314<H>  3.7   |  21.0<L>  |  0.36<L>    Ca    8.1<L>      30 Apr 2019 07:14  Phos  2.5     04-30  Mg     1.8     04-30    TPro  5.2<L>  /  Alb  2.4<L>  /  TBili  <0.2<L>  /  DBili  x   /  AST  8   /  ALT  <5  /  AlkPhos  49  04-29          RADIOLOGY & ADDITIONAL STUDIES:

## 2019-04-30 NOTE — PROGRESS NOTE ADULT - ASSESSMENT
N/V with large DU.  TPN continues with glucose intolerances.  Remainder of lytes OK.  Will reduce the glucose further to 200 gm/ day, equivalent of D10 infusion.  Will increase insulin to 30 u/ day.  Management of NGT per surgery.  OK to switch PPI drip to IVP 40 mg q 12 h.

## 2019-04-30 NOTE — PROGRESS NOTE ADULT - ASSESSMENT
A/P: 89yoF with GOO 2/2 duodenal ulcer. HDS, VSS    - Gastrograffin challenge performed to see if passes through to small intestine; this was decided given her minimal NGT output   - Continue TPN with GI for now: Follow fingersticks closely  - GI planning possible rescope 5/2  - family wants no surgical intervention at this time  - B/L writs restraints for NGT   - dispo: continue inpt care at this time

## 2019-04-30 NOTE — PROGRESS NOTE ADULT - ASSESSMENT
Mrs. Rincon is a 89F with dementia and DM2 admitted with gastric outlet obstruction. S/P EGD on 4/22 with large duodenal ulcer. Pt remains NPO, receiving nutrition via TPN.      PLAN    Gastric Outlet Obstruction  Duodenal Ulcer  - c/w NGT for decompression, minimal output, gastrografin challenge per surgery   - c/w TPN via PICC for nutrition, ?rescope on 5/2, plan per GI     Dementia  - requires full assist ADLs at baseline  - c/w supportive care, frequent reorientation    DM2  - some improvement with fingerstick; likely contributed by TPN  - c/w insulin sliding scale, plan per GI regarding TPN    Palliative Care Encounter  Continuing NGT for decompression although with minimal output, unclear etiology of obstruction as imaging has not revealed any obvious source. Gastrografin challenge per surgery ongoing. Will continue to follow along and support. Mrs. Rincon is a 89F with dementia and DM2 admitted with gastric outlet obstruction. S/P EGD on 4/22 with large duodenal ulcer. Pt remains NPO, receiving nutrition via TPN.      PLAN    Gastric Outlet Obstruction  Duodenal Ulcer  - c/w NGT for decompression, minimal output, gastrografin challenge per surgery   - c/w TPN via PICC for nutrition, ?rescope on 5/2, plan per GI     Dementia  - requires full assist ADLs at baseline  - c/w supportive care, frequent reorientation    DM2  - some improvement with fingerstick; likely contributed by TPN  - c/w insulin sliding scale, plan per GI regarding TPN    Palliative Care Encounter  Spoke with son Blue via  #[phone], unfortunately he is not aware of ongoing medical issues regarding pt and defer to sister Roxanna (left msg at [phone]). In past family meeting by my colleague on 4/25, explored treatment options and they declined any surgical interventions, desired least invasive option. Mrs. Rincon is a 89F with dementia and DM2 admitted with gastric outlet obstruction. S/P EGD on 4/22 with large duodenal ulcer. Pt remains NPO, receiving nutrition via TPN.      PLAN    Gastric Outlet Obstruction  Duodenal Ulcer  - c/w NGT for decompression, minimal output, gastrografin challenge per surgery   - c/w TPN via PICC for nutrition, ?rescope on 5/2, plan per GI     Dementia  - requires full assist ADLs at baseline  - c/w supportive care, frequent reorientation    DM2  - some improvement with fingerstick; likely contributed by TPN  - c/w insulin sliding scale, plan per GI regarding TPN    Palliative Care Encounter  Spoke with son Blue via  #[phone], unfortunately he is not aware of ongoing medical issues regarding pt and defer to sister Roxanna (left msg at [phone]). In past family meeting by my colleague on 4/25, explored treatment options and they declined any surgical interventions, desired least invasive option. Will follow up for ongoing goc.

## 2019-05-01 LAB
ALBUMIN SERPL ELPH-MCNC: 2.6 G/DL — LOW (ref 3.3–5.2)
ALP SERPL-CCNC: 68 U/L — SIGNIFICANT CHANGE UP (ref 40–120)
ALT FLD-CCNC: 10 U/L — SIGNIFICANT CHANGE UP
ANION GAP SERPL CALC-SCNC: 10 MMOL/L — SIGNIFICANT CHANGE UP (ref 5–17)
AST SERPL-CCNC: 9 U/L — SIGNIFICANT CHANGE UP
BASOPHILS # BLD AUTO: 0 K/UL — SIGNIFICANT CHANGE UP (ref 0–0.2)
BASOPHILS NFR BLD AUTO: 0.3 % — SIGNIFICANT CHANGE UP (ref 0–2)
BILIRUB SERPL-MCNC: <0.2 MG/DL — LOW (ref 0.4–2)
BUN SERPL-MCNC: 15 MG/DL — SIGNIFICANT CHANGE UP (ref 8–20)
CALCIUM SERPL-MCNC: 8.6 MG/DL — SIGNIFICANT CHANGE UP (ref 8.6–10.2)
CHLORIDE SERPL-SCNC: 101 MMOL/L — SIGNIFICANT CHANGE UP (ref 98–107)
CO2 SERPL-SCNC: 25 MMOL/L — SIGNIFICANT CHANGE UP (ref 22–29)
CREAT SERPL-MCNC: 0.41 MG/DL — LOW (ref 0.5–1.3)
EOSINOPHIL # BLD AUTO: 0.1 K/UL — SIGNIFICANT CHANGE UP (ref 0–0.5)
EOSINOPHIL NFR BLD AUTO: 2.8 % — SIGNIFICANT CHANGE UP (ref 0–6)
GLUCOSE BLDC GLUCOMTR-MCNC: 209 MG/DL — HIGH (ref 70–99)
GLUCOSE BLDC GLUCOMTR-MCNC: 223 MG/DL — HIGH (ref 70–99)
GLUCOSE BLDC GLUCOMTR-MCNC: 226 MG/DL — HIGH (ref 70–99)
GLUCOSE BLDC GLUCOMTR-MCNC: 249 MG/DL — HIGH (ref 70–99)
GLUCOSE SERPL-MCNC: 237 MG/DL — HIGH (ref 70–115)
HCT VFR BLD CALC: 35.7 % — LOW (ref 37–47)
HGB BLD-MCNC: 11.8 G/DL — LOW (ref 12–16)
LYMPHOCYTES # BLD AUTO: 1.3 K/UL — SIGNIFICANT CHANGE UP (ref 1–4.8)
LYMPHOCYTES # BLD AUTO: 32.8 % — SIGNIFICANT CHANGE UP (ref 20–55)
MAGNESIUM SERPL-MCNC: 1.8 MG/DL — SIGNIFICANT CHANGE UP (ref 1.6–2.6)
MCHC RBC-ENTMCNC: 30 PG — SIGNIFICANT CHANGE UP (ref 27–31)
MCHC RBC-ENTMCNC: 33.1 G/DL — SIGNIFICANT CHANGE UP (ref 32–36)
MCV RBC AUTO: 90.8 FL — SIGNIFICANT CHANGE UP (ref 81–99)
MONOCYTES # BLD AUTO: 0.3 K/UL — SIGNIFICANT CHANGE UP (ref 0–0.8)
MONOCYTES NFR BLD AUTO: 8 % — SIGNIFICANT CHANGE UP (ref 3–10)
NEUTROPHILS # BLD AUTO: 2.2 K/UL — SIGNIFICANT CHANGE UP (ref 1.8–8)
NEUTROPHILS NFR BLD AUTO: 55.8 % — SIGNIFICANT CHANGE UP (ref 37–73)
PHOSPHATE SERPL-MCNC: 3.7 MG/DL — SIGNIFICANT CHANGE UP (ref 2.4–4.7)
PLATELET # BLD AUTO: 187 K/UL — SIGNIFICANT CHANGE UP (ref 150–400)
POTASSIUM SERPL-MCNC: 4.2 MMOL/L — SIGNIFICANT CHANGE UP (ref 3.5–5.3)
POTASSIUM SERPL-SCNC: 4.2 MMOL/L — SIGNIFICANT CHANGE UP (ref 3.5–5.3)
PROT SERPL-MCNC: 5.7 G/DL — LOW (ref 6.6–8.7)
RBC # BLD: 3.93 M/UL — LOW (ref 4.4–5.2)
RBC # FLD: 12.9 % — SIGNIFICANT CHANGE UP (ref 11–15.6)
SODIUM SERPL-SCNC: 136 MMOL/L — SIGNIFICANT CHANGE UP (ref 135–145)
WBC # BLD: 3.9 K/UL — LOW (ref 4.8–10.8)
WBC # FLD AUTO: 3.9 K/UL — LOW (ref 4.8–10.8)

## 2019-05-01 PROCEDURE — 99233 SBSQ HOSP IP/OBS HIGH 50: CPT

## 2019-05-01 PROCEDURE — 74018 RADEX ABDOMEN 1 VIEW: CPT | Mod: 26

## 2019-05-01 PROCEDURE — 99232 SBSQ HOSP IP/OBS MODERATE 35: CPT

## 2019-05-01 PROCEDURE — 99231 SBSQ HOSP IP/OBS SF/LOW 25: CPT

## 2019-05-01 RX ORDER — I.V. FAT EMULSION 20 G/100ML
0.88 EMULSION INTRAVENOUS
Qty: 50 | Refills: 0 | Status: DISCONTINUED | OUTPATIENT
Start: 2019-05-01 | End: 2019-05-01

## 2019-05-01 RX ORDER — ELECTROLYTE SOLUTION,INJ
1 VIAL (ML) INTRAVENOUS
Qty: 0 | Refills: 0 | Status: DISCONTINUED | OUTPATIENT
Start: 2019-05-01 | End: 2019-05-01

## 2019-05-01 RX ORDER — ERYTHROMYCIN ETHYLSUCCINATE 400 MG
250 TABLET ORAL
Qty: 0 | Refills: 0 | Status: DISCONTINUED | OUTPATIENT
Start: 2019-05-01 | End: 2019-05-01

## 2019-05-01 RX ORDER — ERYTHROMYCIN ETHYLSUCCINATE 400 MG
TABLET ORAL
Qty: 0 | Refills: 0 | Status: DISCONTINUED | OUTPATIENT
Start: 2019-05-01 | End: 2019-05-01

## 2019-05-01 RX ADMIN — CHLORHEXIDINE GLUCONATE 15 MILLILITER(S): 213 SOLUTION TOPICAL at 17:16

## 2019-05-01 RX ADMIN — PANTOPRAZOLE SODIUM 40 MILLIGRAM(S): 20 TABLET, DELAYED RELEASE ORAL at 17:16

## 2019-05-01 RX ADMIN — Medication 4: at 11:18

## 2019-05-01 RX ADMIN — Medication 4: at 23:31

## 2019-05-01 RX ADMIN — Medication 4: at 17:18

## 2019-05-01 RX ADMIN — Medication 1 EACH: at 21:40

## 2019-05-01 RX ADMIN — CHLORHEXIDINE GLUCONATE 15 MILLILITER(S): 213 SOLUTION TOPICAL at 05:57

## 2019-05-01 RX ADMIN — Medication 4: at 06:00

## 2019-05-01 RX ADMIN — CHLORHEXIDINE GLUCONATE 1 APPLICATION(S): 213 SOLUTION TOPICAL at 05:57

## 2019-05-01 RX ADMIN — I.V. FAT EMULSION 31.25 GM/KG/DAY: 20 EMULSION INTRAVENOUS at 21:40

## 2019-05-01 RX ADMIN — PANTOPRAZOLE SODIUM 40 MILLIGRAM(S): 20 TABLET, DELAYED RELEASE ORAL at 06:05

## 2019-05-01 RX ADMIN — ENOXAPARIN SODIUM 40 MILLIGRAM(S): 100 INJECTION SUBCUTANEOUS at 11:17

## 2019-05-01 NOTE — PROGRESS NOTE ADULT - ASSESSMENT
Resolved GOOO.  Remains with benign abdominnal exam. ;  Will plan for EGD at surgical request for tomorrow.     Tolerates TPN better.  Reordered TPN/ Lipids with 30 u insulin and 200 gm/ day of glucose.

## 2019-05-01 NOTE — PROGRESS NOTE ADULT - SUBJECTIVE AND OBJECTIVE BOX
FOLLOW UP VISIT, ongoing [facility]    OVERNIGHT EVENTS:  Seen and examined at bedside this morning. Son Blue and granddaughter Will present. Pt more awake today, smiles to verbal stimuli but not able to engage in any meaningful conversation despite Portuguese interpretation from family. NGT pulled out/?self-dislodged this AM. Diet advance to clear liquids. Tentatively planned for repeat EGD 5/2.    Unable to perform ROS due to dementia    MEDICATIONS  (STANDING):  chlorhexidine 0.12% Liquid 15 milliLiter(s) Swish and Spit two times a day  chlorhexidine 2% Cloths 1 Application(s) Topical <User Schedule>  dextrose 5%. 1000 milliLiter(s) (50 mL/Hr) IV Continuous <Continuous>  dextrose 50% Injectable 12.5 Gram(s) IV Push once  dextrose 50% Injectable 25 Gram(s) IV Push once  dextrose 50% Injectable 25 Gram(s) IV Push once  enoxaparin Injectable 40 milliGRAM(s) SubCutaneous daily  erythromycin     base Tablet 250 milliGRAM(s) Oral <User Schedule>  fat emulsion (Plant Based) 20% Infusion 0.88 Gm/kG/Day (31.25 mL/Hr) IV Continuous <Continuous>  insulin lispro (HumaLOG) corrective regimen sliding scale   SubCutaneous every 6 hours  pantoprazole  Injectable 40 milliGRAM(s) IV Push every 12 hours  Parenteral Nutrition - Adult 1 Each (83 mL/Hr) TPN Continuous <Continuous>  Parenteral Nutrition - Adult 1 Each (83 mL/Hr) TPN Continuous <Continuous>    MEDICATIONS  (PRN):  dextrose 40% Gel 15 Gram(s) Oral once PRN Blood Glucose LESS THAN 70 milliGRAM(s)/deciliter  glucagon  Injectable 1 milliGRAM(s) IntraMuscular once PRN Glucose LESS THAN 70 milligrams/deciliter  ondansetron Injectable 4 milliGRAM(s) IV Push every 6 hours PRN Nausea and/or Vomiting  sodium chloride 0.9% lock flush 10 milliLiter(s) IV Push every 1 hour PRN Pre/post blood products, medications, blood draw, and to maintain line patency    PHYSICAL EXAM:  Vital Signs Last 24 Hrs  T(C): 37.1 (01 May 2019 15:37), Max: 37.5 (30 Apr 2019 23:28)  T(F): 98.8 (01 May 2019 15:37), Max: 99.5 (30 Apr 2019 23:28)  HR: 92 (01 May 2019 15:37) (88 - 92)  BP: 128/71 (01 May 2019 15:37) (128/71 - 141/76)  BP(mean): --  RR: 18 (01 May 2019 15:37) (18 - 18)  SpO2: 100% (01 May 2019 15:37) (97% - 100%)    General: thin, cachetic. No acute distress.   HEENT: MMM  Lungs: CTAB. no rales, rhonchi, wheezing.  non-labored.   CV: +s1/s2. Regular rate and rhythm.   GI: +bowel sound. abdomen soft, non-tender, mildly distended   MSK: Moves all 4 extremities.  weakness.    Neuro: Awake, confused, unable to engage in conversation or follow commands  Skin: warm and dry.      LABS:                        11.8   3.9   )-----------( 187      ( 01 May 2019 06:47 )             35.7   05-01    136  |  101  |  15.0  ----------------------------<  237<H>  4.2   |  25.0  |  0.41<L>    Ca    8.6      01 May 2019 06:47  Phos  3.7     05-01  Mg     1.8     05-01    TPro  5.7<L>  /  Alb  2.6<L>  /  TBili  <0.2<L>  /  DBili  x   /  AST  9   /  ALT  10  /  AlkPhos  68  05-01    I&O's Summary    30 Apr 2019 07:01  -  01 May 2019 07:00  --------------------------------------------------------  IN: 1239.4 mL / OUT: 0 mL / NET: 1239.4 mL    RADIOLOGY & ADDITIONAL STUDIES:     KUB 5/1/19  FINDINGS:  Stomach is collapsed.  The bowel gas pattern is within normal limits. Prior ingested contrast   seen within nonobstructed large bowel to the level of rectum. Small bowel unremarkable.  There is no free intra-abdominal air.  There are no obvious intra-abdominal masses.  There are no abnormal calcifications.   The osseous structures are intact.     IMPRESSION:  No acute intra-abdominal findings.      ADVANCE DIRECTIVES:   DNR YES NO  Completed on:     4/25/19                MOLST  YES NO   Completed on:   4/25/19  Living Will  YES NO   Completed on:

## 2019-05-01 NOTE — PROGRESS NOTE ADULT - SUBJECTIVE AND OBJECTIVE BOX
INTERVAL HPI/OVERNIGHT EVENTS:    Pulled out NGT this morning once again, remains on TPN; Gastrografin in rectum after GGC - passed challenge, had BM    SUBJECTIVE:    Patient unable to provide Subjective history    MEDICATIONS  (STANDING):  chlorhexidine 0.12% Liquid 15 milliLiter(s) Swish and Spit two times a day  chlorhexidine 2% Cloths 1 Application(s) Topical <User Schedule>  dextrose 5%. 1000 milliLiter(s) (50 mL/Hr) IV Continuous <Continuous>  dextrose 50% Injectable 12.5 Gram(s) IV Push once  dextrose 50% Injectable 25 Gram(s) IV Push once  dextrose 50% Injectable 25 Gram(s) IV Push once  enoxaparin Injectable 40 milliGRAM(s) SubCutaneous daily  fat emulsion (Plant Based) 20% Infusion 0.88 Gm/kG/Day (31.68 mL/Hr) IV Continuous <Continuous>  insulin lispro (HumaLOG) corrective regimen sliding scale   SubCutaneous every 6 hours  pantoprazole  Injectable 40 milliGRAM(s) IV Push every 12 hours  Parenteral Nutrition - Adult 1 Each (83 mL/Hr) TPN Continuous <Continuous>    MEDICATIONS  (PRN):  dextrose 40% Gel 15 Gram(s) Oral once PRN Blood Glucose LESS THAN 70 milliGRAM(s)/deciliter  glucagon  Injectable 1 milliGRAM(s) IntraMuscular once PRN Glucose LESS THAN 70 milligrams/deciliter  ondansetron Injectable 4 milliGRAM(s) IV Push every 6 hours PRN Nausea and/or Vomiting  sodium chloride 0.9% lock flush 10 milliLiter(s) IV Push every 1 hour PRN Pre/post blood products, medications, blood draw, and to maintain line patency      Vital Signs Last 24 Hrs  T(C): 37.5 (30 Apr 2019 23:28), Max: 37.5 (30 Apr 2019 23:28)  T(F): 99.5 (30 Apr 2019 23:28), Max: 99.5 (30 Apr 2019 23:28)  HR: 90 (30 Apr 2019 23:28) (87 - 93)  BP: 140/79 (30 Apr 2019 23:28) (140/79 - 166/83)  RR: 18 (30 Apr 2019 23:28) (18 - 18)  SpO2: 97% (30 Apr 2019 23:28) (97% - 98%)    PE  Gen: Not in acute distress, lying in bed a/b her family  Pulm: nonlabored breathing  CV: Regular rate  Abd: Soft, less distension than yesterday  Ext: No pitting edema B/L   Vasc: 2+ radial and PT pulses B/L    I&O's Detail    29 Apr 2019 07:01  -  30 Apr 2019 07:00  --------------------------------------------------------  IN:    pantoprazole Infusion: 100 mL    sodium chloride 0.9%: 750 mL    TPN (Total Parenteral Nutrition): 830 mL  Total IN: 1680 mL    OUT:  Total OUT: 0 mL    Total NET: 1680 mL      30 Apr 2019 07:01  -  01 May 2019 05:59  --------------------------------------------------------  IN:    fat emulsion (Plant Based) 20% Infusion: 63.4 mL    pantoprazole Infusion: 30 mL    sodium chloride 0.9%: 150 mL    TPN (Total Parenteral Nutrition): 996 mL  Total IN: 1239.4 mL    OUT:  Total OUT: 0 mL    Total NET: 1239.4 mL      LABS:                        12.0   4.4   )-----------( 170      ( 30 Apr 2019 07:14 )             36.0     04-30    134<L>  |  102  |  11.0  ----------------------------<  314<H>  3.7   |  21.0<L>  |  0.36<L>    Ca    8.1<L>      30 Apr 2019 07:14  Phos  2.5     04-30  Mg     1.8     04-30        RADIOLOGY & ADDITIONAL STUDIES: GGC passed -  final KUB with contrast in the rectum    A/P:    Passed GGC yesterday; contrast in rectum, +BMs  NGT pulled out in the early morning - will discuss with GI whether patient requires NGT - Surgery team placed NGT yesterday given abdominal distention, patient now less distended -  will determine with Dr. Lloyd whether the tube must be replaced  Continue TPN as patient currently NPO  Family in discussions about the level of intervention they feel is appropriate given patient's age and medical issues   Plan for GI to rescope 5/2 - team to prep patient to undergo said scope

## 2019-05-01 NOTE — PROGRESS NOTE ADULT - SUBJECTIVE AND OBJECTIVE BOX
Patient seen and examined;  chart reviewed.  Again pulled out the NGT despite restraints.  Awake but not communicative.  Son at bedside.  Still npo.  Oral gastrograffin passes to the rectum on KUB, therefore no SBO.  No fever; T max: 99.5F.  No respiratory distress.  Extra IVF dc'ed and pantop converted from Drip to IVP q 12 h.  Glucoses running 237 to 300.  Better with lower dose of glucose in TPN bag.        PAST MEDICAL & SURGICAL HISTORY:  Arthritis  Dementia      ROS:  No Heartburn, regurgitation, dysphagia, odynophagia.  No dyspepsia  No abdominal pain.    No Nausea, vomiting.  No Bleeding.  No hematemesis.   No diarrhea.    No hematochesia.  No weight loss, anorexia.  No edema.      MEDICATIONS  (STANDING):  chlorhexidine 0.12% Liquid 15 milliLiter(s) Swish and Spit two times a day  chlorhexidine 2% Cloths 1 Application(s) Topical <User Schedule>  dextrose 5%. 1000 milliLiter(s) (50 mL/Hr) IV Continuous <Continuous>  dextrose 50% Injectable 12.5 Gram(s) IV Push once  dextrose 50% Injectable 25 Gram(s) IV Push once  dextrose 50% Injectable 25 Gram(s) IV Push once  enoxaparin Injectable 40 milliGRAM(s) SubCutaneous daily  fat emulsion (Plant Based) 20% Infusion 0.88 Gm/kG/Day (31.68 mL/Hr) IV Continuous <Continuous>  insulin lispro (HumaLOG) corrective regimen sliding scale   SubCutaneous every 6 hours  pantoprazole  Injectable 40 milliGRAM(s) IV Push every 12 hours  Parenteral Nutrition - Adult 1 Each (83 mL/Hr) TPN Continuous <Continuous>  Parenteral Nutrition - Adult 1 Each (83 mL/Hr) TPN Continuous <Continuous>    MEDICATIONS  (PRN):  dextrose 40% Gel 15 Gram(s) Oral once PRN Blood Glucose LESS THAN 70 milliGRAM(s)/deciliter  glucagon  Injectable 1 milliGRAM(s) IntraMuscular once PRN Glucose LESS THAN 70 milligrams/deciliter  ondansetron Injectable 4 milliGRAM(s) IV Push every 6 hours PRN Nausea and/or Vomiting  sodium chloride 0.9% lock flush 10 milliLiter(s) IV Push every 1 hour PRN Pre/post blood products, medications, blood draw, and to maintain line patency      Allergies    No Known Allergies    Intolerances        Vital Signs Last 24 Hrs  T(C): 36.8 (01 May 2019 07:38), Max: 37.5 (30 Apr 2019 23:28)  T(F): 98.2 (01 May 2019 07:38), Max: 99.5 (30 Apr 2019 23:28)  HR: 88 (01 May 2019 07:38) (87 - 93)  BP: 141/76 (01 May 2019 07:38) (140/79 - 166/83)  BP(mean): --  RR: 18 (30 Apr 2019 23:28) (18 - 18)  SpO2: 97% (30 Apr 2019 23:28) (97% - 98%)    PHYSICAL EXAM:    GENERAL: NAD, well-groomed, well-developed  HEAD:  Atraumatic, Normocephalic  EYES: EOMI, PERRLA, conjunctiva and sclera clear  ENMT: No tonsillar erythema, exudates, or enlargement; Moist mucous membranes, Good dentition, No lesions  NECK: Supple, No JVD, Normal thyroid  CHEST/LUNG: Clear to percussion bilaterally; No rales, rhonchi, wheezing, or rubs  HEART: Regular rate and rhythm; No murmurs, rubs, or gallops  ABDOMEN: Soft, Nontender, Nondistended; Bowel sounds present  EXTREMITIES:  2+ Peripheral Pulses, No clubbing, cyanosis, or edema  LYMPH: No lymphadenopathy noted  SKIN: No rashes or lesions      LABS:                        11.8   3.9   )-----------( 187      ( 01 May 2019 06:47 )             35.7     05-01    136  |  101  |  15.0  ----------------------------<  237<H>  4.2   |  25.0  |  0.41<L>    Ca    8.6      01 May 2019 06:47  Phos  3.7     05-01  Mg     1.8     05-01    TPro  5.7<L>  /  Alb  2.6<L>  /  TBili  <0.2<L>  /  DBili  x   /  AST  9   /  ALT  10  /  AlkPhos  68  05-01             RADIOLOGY & ADDITIONAL STUDIES:  KUB:  No FERNANDES.  Oral Contrast in rectum.

## 2019-05-01 NOTE — PROGRESS NOTE ADULT - ASSESSMENT
Mrs. Rincon is a 89F with dementia and DM2 admitted with gastric outlet obstruction. S/P EGD x 2, with most recent on 4/22 with large duodenal ulcer. Pt receiving nutrition via TPN.      PLAN    Gastric Outlet Obstruction - resolved  Duodenal Ulcer  - s/p NGT (pull out this AM, minimal output in past 24hrs)  - s/p gastrografin challenge with contrast to rectum  - diet advance to clears today, plan for rescope on 5/2 per GI  - c/w TPN via PICC for nutrition    Dementia  - requires full assist ADLs at baseline  - c/w supportive care, frequent reorientation    DM2  - c/w insulin sliding scale, plan per GI regarding TPN    Palliative Care Encounter  Pt appears to be clinically improving slowly with resolution of gastric obstruction. Family express understanding of current plan of rescoping tomorrow and advancement of diet to clear liquids today. They again verbalized no desire for any surgical interventions. They remain hopeful she will continue to improve and take her home when medically stable. Dtr Roxanna did not call back yesterday but per family who spoke with her this AM, she is a  and unable to talk or come in during daytime; no further questions/inputs at this time. Will continue to follow along.

## 2019-05-02 DIAGNOSIS — K27.9 PEPTIC ULCER, SITE UNSPECIFIED, UNSPECIFIED AS ACUTE OR CHRONIC, WITHOUT HEMORRHAGE OR PERFORATION: ICD-10-CM

## 2019-05-02 DIAGNOSIS — Z78.9 OTHER SPECIFIED HEALTH STATUS: ICD-10-CM

## 2019-05-02 LAB
ANION GAP SERPL CALC-SCNC: 10 MMOL/L — SIGNIFICANT CHANGE UP (ref 5–17)
BUN SERPL-MCNC: 22 MG/DL — HIGH (ref 8–20)
CALCIUM SERPL-MCNC: 8.5 MG/DL — LOW (ref 8.6–10.2)
CHLORIDE SERPL-SCNC: 102 MMOL/L — SIGNIFICANT CHANGE UP (ref 98–107)
CO2 SERPL-SCNC: 24 MMOL/L — SIGNIFICANT CHANGE UP (ref 22–29)
CREAT SERPL-MCNC: 0.44 MG/DL — LOW (ref 0.5–1.3)
GLUCOSE BLDC GLUCOMTR-MCNC: 182 MG/DL — HIGH (ref 70–99)
GLUCOSE BLDC GLUCOMTR-MCNC: 205 MG/DL — HIGH (ref 70–99)
GLUCOSE BLDC GLUCOMTR-MCNC: 217 MG/DL — HIGH (ref 70–99)
GLUCOSE BLDC GLUCOMTR-MCNC: 257 MG/DL — HIGH (ref 70–99)
GLUCOSE SERPL-MCNC: 195 MG/DL — HIGH (ref 70–115)
MAGNESIUM SERPL-MCNC: 1.7 MG/DL — SIGNIFICANT CHANGE UP (ref 1.6–2.6)
PHOSPHATE SERPL-MCNC: 3.9 MG/DL — SIGNIFICANT CHANGE UP (ref 2.4–4.7)
POTASSIUM SERPL-MCNC: 4.2 MMOL/L — SIGNIFICANT CHANGE UP (ref 3.5–5.3)
POTASSIUM SERPL-SCNC: 4.2 MMOL/L — SIGNIFICANT CHANGE UP (ref 3.5–5.3)
SODIUM SERPL-SCNC: 136 MMOL/L — SIGNIFICANT CHANGE UP (ref 135–145)

## 2019-05-02 PROCEDURE — 99231 SBSQ HOSP IP/OBS SF/LOW 25: CPT

## 2019-05-02 PROCEDURE — 99358 PROLONG SERVICE W/O CONTACT: CPT

## 2019-05-02 PROCEDURE — 99232 SBSQ HOSP IP/OBS MODERATE 35: CPT

## 2019-05-02 PROCEDURE — 99232 SBSQ HOSP IP/OBS MODERATE 35: CPT | Mod: 25

## 2019-05-02 RX ORDER — ELECTROLYTE SOLUTION,INJ
1 VIAL (ML) INTRAVENOUS
Qty: 0 | Refills: 0 | Status: DISCONTINUED | OUTPATIENT
Start: 2019-05-02 | End: 2019-05-02

## 2019-05-02 RX ORDER — I.V. FAT EMULSION 20 G/100ML
0.87 EMULSION INTRAVENOUS
Qty: 50 | Refills: 0 | Status: DISCONTINUED | OUTPATIENT
Start: 2019-05-02 | End: 2019-05-02

## 2019-05-02 RX ORDER — MAGNESIUM SULFATE 500 MG/ML
2 VIAL (ML) INJECTION ONCE
Qty: 0 | Refills: 0 | Status: COMPLETED | OUTPATIENT
Start: 2019-05-02 | End: 2019-05-02

## 2019-05-02 RX ORDER — ERYTHROMYCIN ETHYLSUCCINATE 400 MG
500 TABLET ORAL THREE TIMES A DAY
Qty: 0 | Refills: 0 | Status: DISCONTINUED | OUTPATIENT
Start: 2019-05-02 | End: 2019-05-02

## 2019-05-02 RX ORDER — DIPHENHYDRAMINE HYDROCHLORIDE AND LIDOCAINE HYDROCHLORIDE AND ALUMINUM HYDROXIDE AND MAGNESIUM HYDRO
5 KIT THREE TIMES A DAY
Qty: 0 | Refills: 0 | Status: DISCONTINUED | OUTPATIENT
Start: 2019-05-02 | End: 2019-05-04

## 2019-05-02 RX ORDER — SODIUM CHLORIDE 9 MG/ML
1000 INJECTION, SOLUTION INTRAVENOUS
Qty: 0 | Refills: 0 | Status: DISCONTINUED | OUTPATIENT
Start: 2019-05-02 | End: 2019-05-04

## 2019-05-02 RX ADMIN — Medication 6: at 23:42

## 2019-05-02 RX ADMIN — PANTOPRAZOLE SODIUM 40 MILLIGRAM(S): 20 TABLET, DELAYED RELEASE ORAL at 17:19

## 2019-05-02 RX ADMIN — CHLORHEXIDINE GLUCONATE 15 MILLILITER(S): 213 SOLUTION TOPICAL at 07:11

## 2019-05-02 RX ADMIN — DIPHENHYDRAMINE HYDROCHLORIDE AND LIDOCAINE HYDROCHLORIDE AND ALUMINUM HYDROXIDE AND MAGNESIUM HYDRO 5 MILLILITER(S): KIT at 23:42

## 2019-05-02 RX ADMIN — Medication 4: at 07:12

## 2019-05-02 RX ADMIN — Medication 2: at 12:28

## 2019-05-02 RX ADMIN — Medication 4: at 17:19

## 2019-05-02 RX ADMIN — CHLORHEXIDINE GLUCONATE 1 APPLICATION(S): 213 SOLUTION TOPICAL at 07:11

## 2019-05-02 RX ADMIN — SODIUM CHLORIDE 50 MILLILITER(S): 9 INJECTION, SOLUTION INTRAVENOUS at 23:43

## 2019-05-02 RX ADMIN — Medication 50 GRAM(S): at 15:17

## 2019-05-02 RX ADMIN — CHLORHEXIDINE GLUCONATE 15 MILLILITER(S): 213 SOLUTION TOPICAL at 17:17

## 2019-05-02 RX ADMIN — PANTOPRAZOLE SODIUM 40 MILLIGRAM(S): 20 TABLET, DELAYED RELEASE ORAL at 07:11

## 2019-05-02 NOTE — PROGRESS NOTE ADULT - PROBLEM SELECTOR PLAN 3
will renew and increase insulin to 35 units
family meeting held with pt's grandson Serjio Lubin, daughter Roxanna Lubin and I with the use of language line  [place] #[phone]. Discussed again the surgical options of treatment for pt's GOO including the NGT/TPN, extensive surgery, lesser surgery (vent peg), and stent.  She did say that she did not want her mother to suffer or have pain, she stated "no surgery" She would like to proceed for the least invasive options possible. I also touched on the fact that patient has been pulling out NGT and IVs, that this may become a recurring issue that may alter or delay further treatment plans. She expressed understanding and hoped that her mother could cooperate with the plan for NGT/TPN. We went on to discuss GOC in detail and completed a MOLST form. She wants her mother not to suffer, allow for a natural death and not place her on any machines. See GOC note/MOLST form.
met with daughter at bedside, explained (in Chinese) pt's lethargy today and the use of wrist restraints to keep NGT in place in order to decompress her stomach and prevent vomiting/promote healing. PICC line for TPN- explained TPN and provided emotional support.

## 2019-05-02 NOTE — CHART NOTE - NSCHARTNOTEFT_GEN_A_CORE
Source: Patient [ ]  Family [ ]   other [ ]    Current Diet:   Diet, Regular:   Consistent Carbohydrate {Evening Snacks} (05-02-19 @ 11:15)    Patient reports [ ] nausea  [ ] vomiting [ ] diarrhea [ ] constipation  [ ]chewing problems [ ] swallowing issues  [ ] other:     PO intake:  < 50% [ ]   50-75%  [ ]   %  [ ]  other :    Source for PO intake [ ] Patient [ ] family [ ] chart [ ] staff [ ] other    Current Weight:         % Weight Change     Pertinent Medications: MEDICATIONS  (STANDING):  chlorhexidine 0.12% Liquid 15 milliLiter(s) Swish and Spit two times a day  dextrose 5% + sodium chloride 0.9%. 1000 milliLiter(s) (50 mL/Hr) IV Continuous <Continuous>  dextrose 5%. 1000 milliLiter(s) (50 mL/Hr) IV Continuous <Continuous>  dextrose 50% Injectable 12.5 Gram(s) IV Push once  dextrose 50% Injectable 25 Gram(s) IV Push once  dextrose 50% Injectable 25 Gram(s) IV Push once  insulin lispro (HumaLOG) corrective regimen sliding scale   SubCutaneous every 6 hours  magnesium sulfate  IVPB 2 Gram(s) IV Intermittent once  pantoprazole  Injectable 40 milliGRAM(s) IV Push every 12 hours  Parenteral Nutrition - Adult 1 Each (83 mL/Hr) TPN Continuous <Continuous>    MEDICATIONS  (PRN):  dextrose 40% Gel 15 Gram(s) Oral once PRN Blood Glucose LESS THAN 70 milliGRAM(s)/deciliter  glucagon  Injectable 1 milliGRAM(s) IntraMuscular once PRN Glucose LESS THAN 70 milligrams/deciliter  ondansetron Injectable 4 milliGRAM(s) IV Push every 6 hours PRN Nausea and/or Vomiting  sodium chloride 0.9% lock flush 10 milliLiter(s) IV Push every 1 hour PRN Pre/post blood products, medications, blood draw, and to maintain line patency    Pertinent Labs: CBC Full  -  ( 01 May 2019 06:47 )  WBC Count : 3.9 K/uL  RBC Count : 3.93 M/uL  Hemoglobin : 11.8 g/dL  Hematocrit : 35.7 %  Platelet Count - Automated : 187 K/uL  Mean Cell Volume : 90.8 fl  Mean Cell Hemoglobin : 30.0 pg  Mean Cell Hemoglobin Concentration : 33.1 g/dL  Auto Neutrophil # : 2.2 K/uL  Auto Lymphocyte # : 1.3 K/uL  Auto Monocyte # : 0.3 K/uL  Auto Eosinophil # : 0.1 K/uL  Auto Basophil # : 0.0 K/uL  Auto Neutrophil % : 55.8 %  Auto Lymphocyte % : 32.8 %  Auto Monocyte % : 8.0 %  Auto Eosinophil % : 2.8 %  Auto Basophil % : 0.3 %  05-02 Na136 mmol/L Glu 195 mg/dL<H> K+ 4.2 mmol/L Cr  0.44 mg/dL<L> BUN 22.0 mg/dL<H> Phos 3.9 mg/dL Alb n/a   PAB n/a       Skin:     Nutrition focused physical exam conducted - found signs of malnutrition [ ]absent [ ]present    Subcutaneous fat loss: [ ] Orbital fat pads region, [ ]Buccal fat region, [ ]Triceps region,  [ ]Ribs region    Muscle wasting: [ ]Temples region, [ ]Clavicle region, [ ]Shoulder region, [ ]Scapula region, [ ]Interosseous region,  [ ]thigh region, [ ]Calf region    Estimated Needs:   [ ] no change since previous assessment  [ ] recalculated:     Current Nutrition Diagnosis:    Recommendations:     Monitoring and Evaluation:   [ ] PO intake [ ] Tolerance to diet prescription [X] Weights  [X] Follow up per protocol [X] Labs: Source: Patient [ ]  Family [ ]   other [ ]    Current Diet:   Diet, Regular:   Consistent Carbohydrate {Evening Snacks} (05-02-19 @ 11:15)    Patient reports [ ] nausea  [ ] vomiting [ ] diarrhea [ ] constipation  [ ]chewing problems [ ] swallowing issues  [ ] other:     PO intake:  < 50% [X]   50-75%  [ ]   %  [ ]  other :    Source for PO intake [ ] Patient [ ] family [X] chart [ ] staff [ ] other    Current Weight:   (4/24)    133.6lbs  (4/20)    126.9lbs    % Weight Change: No recent weight documented     Pertinent Medications: MEDICATIONS  (STANDING):  chlorhexidine 0.12% Liquid 15 milliLiter(s) Swish and Spit two times a day  dextrose 5% + sodium chloride 0.9%. 1000 milliLiter(s) (50 mL/Hr) IV Continuous <Continuous>  dextrose 5%. 1000 milliLiter(s) (50 mL/Hr) IV Continuous <Continuous>  dextrose 50% Injectable 12.5 Gram(s) IV Push once  dextrose 50% Injectable 25 Gram(s) IV Push once  dextrose 50% Injectable 25 Gram(s) IV Push once  insulin lispro (HumaLOG) corrective regimen sliding scale   SubCutaneous every 6 hours  magnesium sulfate  IVPB 2 Gram(s) IV Intermittent once  pantoprazole  Injectable 40 milliGRAM(s) IV Push every 12 hours  Parenteral Nutrition - Adult 1 Each (83 mL/Hr) TPN Continuous <Continuous>    MEDICATIONS  (PRN):  dextrose 40% Gel 15 Gram(s) Oral once PRN Blood Glucose LESS THAN 70 milliGRAM(s)/deciliter  glucagon  Injectable 1 milliGRAM(s) IntraMuscular once PRN Glucose LESS THAN 70 milligrams/deciliter  ondansetron Injectable 4 milliGRAM(s) IV Push every 6 hours PRN Nausea and/or Vomiting  sodium chloride 0.9% lock flush 10 milliLiter(s) IV Push every 1 hour PRN Pre/post blood products, medications, blood draw, and to maintain line patency    Pertinent Labs: CBC Full  -  ( 01 May 2019 06:47 )  WBC Count : 3.9 K/uL  RBC Count : 3.93 M/uL  Hemoglobin : 11.8 g/dL  Hematocrit : 35.7 %  Platelet Count - Automated : 187 K/uL  Mean Cell Volume : 90.8 fl  Mean Cell Hemoglobin : 30.0 pg  Mean Cell Hemoglobin Concentration : 33.1 g/dL  Auto Neutrophil # : 2.2 K/uL  Auto Lymphocyte # : 1.3 K/uL  Auto Monocyte # : 0.3 K/uL  Auto Eosinophil # : 0.1 K/uL  Auto Basophil # : 0.0 K/uL  Auto Neutrophil % : 55.8 %  Auto Lymphocyte % : 32.8 %  Auto Monocyte % : 8.0 %  Auto Eosinophil % : 2.8 %  Auto Basophil % : 0.3 %  05-02 Na136 mmol/L Glu 195 mg/dL<H> K+ 4.2 mmol/L Cr  0.44 mg/dL<L> BUN 22.0 mg/dL<H> Phos 3.9 mg/dL Alb n/a   PAB n/a       Skin: No skin breakdown/edema noted     Nutrition focused physical exam conducted - found signs of malnutrition [ ]absent [ ]present    Subcutaneous fat loss: [ ] Orbital fat pads region, [ ]Buccal fat region, [ ]Triceps region,  [ ]Ribs region    Muscle wasting: [ ]Temples region, [ ]Clavicle region, [ ]Shoulder region, [ ]Scapula region, [ ]Interosseous region,  [ ]thigh region, [ ]Calf region    Estimated Needs:   [ ] no change since previous assessment  [ ] recalculated:     Current Nutrition Diagnosis:    Recommendations:     Monitoring and Evaluation:   [ ] PO intake [ ] Tolerance to diet prescription [X] Weights  [X] Follow up per protocol [X] Labs: Source: Patient [ ]  Family [ ]   other [X] RN; Pt noncommunicative    Current Diet:   Diet, Regular:   Consistent Carbohydrate {Evening Snacks} (05-02-19 @ 11:15)    Patient reports [ ] nausea  [ ] vomiting [ ] diarrhea [ ] constipation  [ ]chewing problems [ ] swallowing issues  [ ] other:     PO intake:  < 50% [X]   50-75%  [ ]   %  [ ]  other :    Source for PO intake [ ] Patient [ ] family [X] chart [ ] staff [ ] other    Current Weight:   (4/24)    133.6lbs  (4/20)    126.9lbs    % Weight Change: No recent weight documented     Pertinent Medications: MEDICATIONS  (STANDING):  chlorhexidine 0.12% Liquid 15 milliLiter(s) Swish and Spit two times a day  dextrose 5% + sodium chloride 0.9%. 1000 milliLiter(s) (50 mL/Hr) IV Continuous <Continuous>  dextrose 5%. 1000 milliLiter(s) (50 mL/Hr) IV Continuous <Continuous>  dextrose 50% Injectable 12.5 Gram(s) IV Push once  dextrose 50% Injectable 25 Gram(s) IV Push once  dextrose 50% Injectable 25 Gram(s) IV Push once  insulin lispro (HumaLOG) corrective regimen sliding scale   SubCutaneous every 6 hours  magnesium sulfate  IVPB 2 Gram(s) IV Intermittent once  pantoprazole  Injectable 40 milliGRAM(s) IV Push every 12 hours  Parenteral Nutrition - Adult 1 Each (83 mL/Hr) TPN Continuous <Continuous>    MEDICATIONS  (PRN):  dextrose 40% Gel 15 Gram(s) Oral once PRN Blood Glucose LESS THAN 70 milliGRAM(s)/deciliter  glucagon  Injectable 1 milliGRAM(s) IntraMuscular once PRN Glucose LESS THAN 70 milligrams/deciliter  ondansetron Injectable 4 milliGRAM(s) IV Push every 6 hours PRN Nausea and/or Vomiting  sodium chloride 0.9% lock flush 10 milliLiter(s) IV Push every 1 hour PRN Pre/post blood products, medications, blood draw, and to maintain line patency    Pertinent Labs: CBC Full  -  ( 01 May 2019 06:47 )  WBC Count : 3.9 K/uL  RBC Count : 3.93 M/uL  Hemoglobin : 11.8 g/dL  Hematocrit : 35.7 %  Platelet Count - Automated : 187 K/uL  Mean Cell Volume : 90.8 fl  Mean Cell Hemoglobin : 30.0 pg  Mean Cell Hemoglobin Concentration : 33.1 g/dL  Auto Neutrophil # : 2.2 K/uL  Auto Lymphocyte # : 1.3 K/uL  Auto Monocyte # : 0.3 K/uL  Auto Eosinophil # : 0.1 K/uL  Auto Basophil # : 0.0 K/uL  Auto Neutrophil % : 55.8 %  Auto Lymphocyte % : 32.8 %  Auto Monocyte % : 8.0 %  Auto Eosinophil % : 2.8 %  Auto Basophil % : 0.3 %  05-02 Na136 mmol/L Glu 195 mg/dL<H> K+ 4.2 mmol/L Cr  0.44 mg/dL<L> BUN 22.0 mg/dL<H> Phos 3.9 mg/dL Alb n/a   PAB n/a       Skin: No skin breakdown/edema noted     Nutrition focused physical exam conducted - found signs of malnutrition [ ]absent [ ]present    Subcutaneous fat loss: [ ] Orbital fat pads region, [ ]Buccal fat region, [ ]Triceps region,  [ ]Ribs region    Muscle wasting: [ ]Temples region, [ ]Clavicle region, [ ]Shoulder region, [ ]Scapula region, [ ]Interosseous region,  [ ]thigh region, [ ]Calf region    Estimated Needs:   [X] no change since previous assessment  [ ] recalculated:     Current Nutrition Diagnosis:  Pt remains at high nutrition risk secondary to moderate chronic protein calorie malnutrition, related to gastric outlet obstruction and duodenal ulcers,  as evidenced by <75% of nutritional needs x 1 month, 17 lb (12%) wt loss x 6 months, and physical signs. TPN d/c'd as Pt is able to tolerate CLD, no longer with obstruction.     Recommendations:   1) Advance diet as tolerated: CLD->FLD-> low residue/fiber/lactose  2) Add Ensure   2) Provide encouragement/assistance as needed during mealtimes to inc PO   3) Obtain/provide food preferences daily to inc PO     Monitoring and Evaluation:   [X] PO intake [X] Tolerance to diet prescription [X] Weights  [X] Follow up per protocol [X] Labs: Source: Patient [ ]  Family [ ]   other [X] RN; Pt noncommunicative    Current Diet:   Diet, Regular:   Consistent Carbohydrate {Evening Snacks} (05-02-19 @ 11:15)    Patient reports [ ] nausea  [ ] vomiting [ ] diarrhea [ ] constipation  [ ]chewing problems [ ] swallowing issues  [ ] other:     PO intake:  < 50% [X]   50-75%  [ ]   %  [ ]  other :    Source for PO intake [ ] Patient [ ] family [X] chart [ ] staff [ ] other    Current Weight:   (4/24)    133.6lbs  (4/20)    126.9lbs    % Weight Change: No recent weight documented     Pertinent Medications: MEDICATIONS  (STANDING):  chlorhexidine 0.12% Liquid 15 milliLiter(s) Swish and Spit two times a day  dextrose 5% + sodium chloride 0.9%. 1000 milliLiter(s) (50 mL/Hr) IV Continuous <Continuous>  dextrose 5%. 1000 milliLiter(s) (50 mL/Hr) IV Continuous <Continuous>  dextrose 50% Injectable 12.5 Gram(s) IV Push once  dextrose 50% Injectable 25 Gram(s) IV Push once  dextrose 50% Injectable 25 Gram(s) IV Push once  insulin lispro (HumaLOG) corrective regimen sliding scale   SubCutaneous every 6 hours  magnesium sulfate  IVPB 2 Gram(s) IV Intermittent once  pantoprazole  Injectable 40 milliGRAM(s) IV Push every 12 hours  Parenteral Nutrition - Adult 1 Each (83 mL/Hr) TPN Continuous <Continuous>    MEDICATIONS  (PRN):  dextrose 40% Gel 15 Gram(s) Oral once PRN Blood Glucose LESS THAN 70 milliGRAM(s)/deciliter  glucagon  Injectable 1 milliGRAM(s) IntraMuscular once PRN Glucose LESS THAN 70 milligrams/deciliter  ondansetron Injectable 4 milliGRAM(s) IV Push every 6 hours PRN Nausea and/or Vomiting  sodium chloride 0.9% lock flush 10 milliLiter(s) IV Push every 1 hour PRN Pre/post blood products, medications, blood draw, and to maintain line patency    Pertinent Labs: CBC Full  -  ( 01 May 2019 06:47 )  WBC Count : 3.9 K/uL  RBC Count : 3.93 M/uL  Hemoglobin : 11.8 g/dL  Hematocrit : 35.7 %  Platelet Count - Automated : 187 K/uL  Mean Cell Volume : 90.8 fl  Mean Cell Hemoglobin : 30.0 pg  Mean Cell Hemoglobin Concentration : 33.1 g/dL  Auto Neutrophil # : 2.2 K/uL  Auto Lymphocyte # : 1.3 K/uL  Auto Monocyte # : 0.3 K/uL  Auto Eosinophil # : 0.1 K/uL  Auto Basophil # : 0.0 K/uL  Auto Neutrophil % : 55.8 %  Auto Lymphocyte % : 32.8 %  Auto Monocyte % : 8.0 %  Auto Eosinophil % : 2.8 %  Auto Basophil % : 0.3 %  05-02 Na136 mmol/L Glu 195 mg/dL<H> K+ 4.2 mmol/L Cr  0.44 mg/dL<L> BUN 22.0 mg/dL<H> Phos 3.9 mg/dL Alb n/a   PAB n/a       Skin: No skin breakdown/edema noted     Nutrition focused physical exam conducted - found signs of malnutrition [ ]absent [ x]present    Subcutaneous fat loss: [x ] Orbital fat pads region, [ x]Buccal fat region, [ ]Triceps region,  [ ]Ribs region    Muscle wasting: [x ]Temples region, [x ]Clavicle region, [ ]Shoulder region, [ ]Scapula region, [ ]Interosseous region,  [ ]thigh region, [ ]Calf region    Estimated Needs:   [X] no change since previous assessment  [ ] recalculated:     Current Nutrition Diagnosis:  Pt remains at high nutrition risk secondary to moderate chronic protein calorie malnutrition, related to gastric outlet obstruction and duodenal ulcers,  as evidenced by <75% of nutritional needs x 1 month, 17 lb (12%) wt loss x 6 months, and physical signs. TPN d/c'd as Pt is able to tolerate CLD, no longer with obstruction.     Recommendations:   1) Advance diet as tolerated: CLD->FLD-> low residue/fiber/lactose  2) Add Ensure   2) Provide encouragement/assistance as needed during mealtimes to inc PO   3) Obtain/provide food preferences daily to inc PO     Monitoring and Evaluation:   [X] PO intake [X] Tolerance to diet prescription [X] Weights  [X] Follow up per protocol [X] Labs:

## 2019-05-02 NOTE — PROGRESS NOTE ADULT - ATTENDING COMMENTS
D/W granddaughter, RN, surgical resident
D/W pt's family, RN, surgery team
D/W pt's family, RN, surgery team, hospice team
Delayed documentation.  Patient w/ GOO. Plan for endoscopy by GI.
avss  abd soft , no tympany  labs , kub seen  plan  GOO has resolved, tolerated clears yesterday, will increase to regular diet, PT evaluating , gi will d/c tpn today, no need for egd today, planning for d/c to home this weekend. d/w family in [place]
ngt in place minimal output. decompressed stomach. has gastric outlet obstruction.  on protonix bid  planning for gi edg today
D/W pt's son, surgery team, RN
Seen and examined.  Agree w/ above.  Afebrile.  HD normal.  NGT to suction.  Asleep but arousable.  Abdomen: S/NT/ND.  PPI.  Await GI for EGD.  Keep NGT to suction.
case d/w Flo El
will follow.

## 2019-05-02 NOTE — PROGRESS NOTE ADULT - SUBJECTIVE AND OBJECTIVE BOX
FOLLOW UP VISIT, ongoing [facility]    OVERNIGHT EVENTS:  Seen and examined at bedside this morning with assistance from [employer]  [place] #[id number]. Daughter Roxanna and Granddaughter Will present. Pt is oob to chair. EGD cancelled as obstruction is resolved and no longer warranted; diet advance to regular.     Unable to perform ROS due to dementia.     MEDICATIONS  (STANDING):  chlorhexidine 0.12% Liquid 15 milliLiter(s) Swish and Spit two times a day  dextrose 5% + sodium chloride 0.9%. 1000 milliLiter(s) (50 mL/Hr) IV Continuous <Continuous>  dextrose 5%. 1000 milliLiter(s) (50 mL/Hr) IV Continuous <Continuous>  dextrose 50% Injectable 12.5 Gram(s) IV Push once  dextrose 50% Injectable 25 Gram(s) IV Push once  dextrose 50% Injectable 25 Gram(s) IV Push once  insulin lispro (HumaLOG) corrective regimen sliding scale   SubCutaneous every 6 hours  magnesium sulfate  IVPB 2 Gram(s) IV Intermittent once  pantoprazole  Injectable 40 milliGRAM(s) IV Push every 12 hours  Parenteral Nutrition - Adult 1 Each (83 mL/Hr) TPN Continuous <Continuous>    MEDICATIONS  (PRN):  dextrose 40% Gel 15 Gram(s) Oral once PRN Blood Glucose LESS THAN 70 milliGRAM(s)/deciliter  glucagon  Injectable 1 milliGRAM(s) IntraMuscular once PRN Glucose LESS THAN 70 milligrams/deciliter  ondansetron Injectable 4 milliGRAM(s) IV Push every 6 hours PRN Nausea and/or Vomiting  sodium chloride 0.9% lock flush 10 milliLiter(s) IV Push every 1 hour PRN Pre/post blood products, medications, blood draw, and to maintain line patency    PHYSICAL EXAM:  Vital Signs Last 24 Hrs  T(C): 36.5 (02 May 2019 07:53), Max: 37.7 (01 May 2019 23:29)  T(F): 97.7 (02 May 2019 07:53), Max: 99.9 (01 May 2019 23:29)  HR: 87 (02 May 2019 07:53) (87 - 96)  BP: 108/62 (02 May 2019 07:53) (108/62 - 128/71)  BP(mean): --  RR: 18 (02 May 2019 07:53) (18 - 18)  SpO2: 99% (02 May 2019 07:53) (98% - 100%)    General: thin, cachetic. No acute distress.   HEENT: MMM. bitemporal wasting  Lungs: CTAB. no rales, rhonchi, wheezing.  non-labored.   CV: +s1/s2. Regular rate and rhythm.   GI: +bowel sound. abdomen soft, non-tender, nondistended  MSK: Moves all 4 extremities. + weakness.    Neuro: Awake, confused, unable to engage in conversation and poor concentration despite daughter and  assistance.  Skin: warm and dry.      LABS:                                   11.8   3.9   )-----------( 187      ( 01 May 2019 06:47 )             35.7     05-02    136  |  102  |  22.0<H>  ----------------------------<  195<H>  4.2   |  24.0  |  0.44<L>    Ca    8.5<L>      02 May 2019 10:25  Phos  3.9     05-02  Mg     1.7     05-02    TPro  5.7<L>  /  Alb  2.6<L>  /  TBili  <0.2<L>  /  DBili  x   /  AST  9   /  ALT  10  /  AlkPhos  68  05-01    I&O's Summary    01 May 2019 07:01  -  02 May 2019 07:00  --------------------------------------------------------  IN: 913 mL / OUT: 0 mL / NET: 913 mL    RADIOLOGY & ADDITIONAL STUDIES: reviewed, no new studies    ADVANCE DIRECTIVES:   DNR YES NO  Completed on:     4/25/19                MOLST  YES NO   Completed on:   4/25/19  Living Will  YES NO   Completed on:

## 2019-05-02 NOTE — PROGRESS NOTE ADULT - SUBJECTIVE AND OBJECTIVE BOX
Pt seen and examined partial GOO due to PUD and large bulbar ulcer    This morning nurses note mark she has tolerated clear liquids without vomiting. She is noncommunicative. BS's still high. Labs today pending    REVIEW OF SYSTEMS:  unable to obtain        MEDICATIONS:  MEDICATIONS  (STANDING):  chlorhexidine 0.12% Liquid 15 milliLiter(s) Swish and Spit two times a day  chlorhexidine 2% Cloths 1 Application(s) Topical <User Schedule>  dextrose 5%. 1000 milliLiter(s) (50 mL/Hr) IV Continuous <Continuous>  dextrose 50% Injectable 12.5 Gram(s) IV Push once  dextrose 50% Injectable 25 Gram(s) IV Push once  dextrose 50% Injectable 25 Gram(s) IV Push once  erythromycin     base Tablet 500 milliGRAM(s) Oral three times a day  fat emulsion (Plant Based) 20% Infusion 0.868 Gm/kG/Day (31.247 mL/Hr) IV Continuous <Continuous>  insulin lispro (HumaLOG) corrective regimen sliding scale   SubCutaneous every 6 hours  pantoprazole  Injectable 40 milliGRAM(s) IV Push every 12 hours  Parenteral Nutrition - Adult 1 Each (83 mL/Hr) TPN Continuous <Continuous>  Parenteral Nutrition - Adult 1 Each (83 mL/Hr) TPN Continuous <Continuous>    MEDICATIONS  (PRN):  dextrose 40% Gel 15 Gram(s) Oral once PRN Blood Glucose LESS THAN 70 milliGRAM(s)/deciliter  glucagon  Injectable 1 milliGRAM(s) IntraMuscular once PRN Glucose LESS THAN 70 milligrams/deciliter  ondansetron Injectable 4 milliGRAM(s) IV Push every 6 hours PRN Nausea and/or Vomiting  sodium chloride 0.9% lock flush 10 milliLiter(s) IV Push every 1 hour PRN Pre/post blood products, medications, blood draw, and to maintain line patency      Allergies    No Known Allergies    Intolerances        Vital Signs Last 24 Hrs  T(C): 37.7 (01 May 2019 23:29), Max: 37.7 (01 May 2019 23:29)  T(F): 99.9 (01 May 2019 23:29), Max: 99.9 (01 May 2019 23:29)  HR: 96 (01 May 2019 23:29) (88 - 96)  BP: 111/65 (01 May 2019 23:29) (111/65 - 141/76)  BP(mean): --  RR: 18 (01 May 2019 23:29) (18 - 18)  SpO2: 98% (01 May 2019 23:29) (98% - 100%)    05-01 @ 07:01  -  05-02 @ 07:00  --------------------------------------------------------  IN: 913 mL / OUT: 0 mL / NET: 913 mL        PHYSICAL EXAM:    General: Well developed; well nourished; in no acute distress, awqake but noncommunicative  HEENT: MMM, conjunctiva and sclera clear  Gastrointestinal:Abdomen: Soft non-tender non-distended; Normal bowel sounds; No hepatosplenomegaly  Extremities: no cyanosis, clubbing or edema.  Skin: Warm and dry. No obvious rash    LABS:      CBC Full  -  ( 01 May 2019 06:47 )  WBC Count : 3.9 K/uL  RBC Count : 3.93 M/uL  Hemoglobin : 11.8 g/dL  Hematocrit : 35.7 %  Platelet Count - Automated : 187 K/uL  Mean Cell Volume : 90.8 fl  Mean Cell Hemoglobin : 30.0 pg  Mean Cell Hemoglobin Concentration : 33.1 g/dL  Auto Neutrophil # : 2.2 K/uL  Auto Lymphocyte # : 1.3 K/uL  Auto Monocyte # : 0.3 K/uL  Auto Eosinophil # : 0.1 K/uL  Auto Basophil # : 0.0 K/uL  Auto Neutrophil % : 55.8 %  Auto Lymphocyte % : 32.8 %  Auto Monocyte % : 8.0 %  Auto Eosinophil % : 2.8 %  Auto Basophil % : 0.3 %    05-01    136  |  101  |  15.0  ----------------------------<  237<H>  4.2   |  25.0  |  0.41<L>    Ca    8.6      01 May 2019 06:47  Phos  3.7     05-01  Mg     1.8     05-01    TPro  5.7<L>  /  Alb  2.6<L>  /  TBili  <0.2<L>  /  DBili  x   /  AST  9   /  ALT  10  /  AlkPhos  68  05-01

## 2019-05-02 NOTE — CHART NOTE - NSCHARTNOTEFT_GEN_A_CORE
Spoke to Dr Lloyd. As the patient has no more obstruction, will cancel scheduled EGD. Will also discontinue TPN and start on D5@ 50 mL/hour. Montior glucose closely.

## 2019-05-02 NOTE — PROGRESS NOTE ADULT - SUBJECTIVE AND OBJECTIVE BOX
INTERVAL HPI/OVERNIGHT EVENTS:    SUBJECTIVE:  DANA overnight.  As per nurse, patient tolerated clears.  No n/v/sob/cpfeverschills      MEDICATIONS  (STANDING):  chlorhexidine 0.12% Liquid 15 milliLiter(s) Swish and Spit two times a day  chlorhexidine 2% Cloths 1 Application(s) Topical <User Schedule>  dextrose 5%. 1000 milliLiter(s) (50 mL/Hr) IV Continuous <Continuous>  dextrose 50% Injectable 12.5 Gram(s) IV Push once  dextrose 50% Injectable 25 Gram(s) IV Push once  dextrose 50% Injectable 25 Gram(s) IV Push once  erythromycin     base Tablet 500 milliGRAM(s) Oral three times a day  fat emulsion (Plant Based) 20% Infusion 0.868 Gm/kG/Day (31.247 mL/Hr) IV Continuous <Continuous>  insulin lispro (HumaLOG) corrective regimen sliding scale   SubCutaneous every 6 hours  pantoprazole  Injectable 40 milliGRAM(s) IV Push every 12 hours  Parenteral Nutrition - Adult 1 Each (83 mL/Hr) TPN Continuous <Continuous>  Parenteral Nutrition - Adult 1 Each (83 mL/Hr) TPN Continuous <Continuous>    MEDICATIONS  (PRN):  dextrose 40% Gel 15 Gram(s) Oral once PRN Blood Glucose LESS THAN 70 milliGRAM(s)/deciliter  glucagon  Injectable 1 milliGRAM(s) IntraMuscular once PRN Glucose LESS THAN 70 milligrams/deciliter  ondansetron Injectable 4 milliGRAM(s) IV Push every 6 hours PRN Nausea and/or Vomiting  sodium chloride 0.9% lock flush 10 milliLiter(s) IV Push every 1 hour PRN Pre/post blood products, medications, blood draw, and to maintain line patency      Vital Signs Last 24 Hrs  T(C): 37.7 (01 May 2019 23:29), Max: 37.7 (01 May 2019 23:29)  T(F): 99.9 (01 May 2019 23:29), Max: 99.9 (01 May 2019 23:29)  HR: 96 (01 May 2019 23:29) (92 - 96)  BP: 111/65 (01 May 2019 23:29) (111/65 - 128/71)  BP(mean): --  RR: 18 (01 May 2019 23:29) (18 - 18)  SpO2: 98% (01 May 2019 23:29) (98% - 100%)    PE  Gen: NAD  Pulm: nonlabored breathing  CV: RRR in 90s  Abd: soft, nt, nd  Ext: no cyanosis, clubbing, or edema      I&O's Detail    01 May 2019 07:01  -  02 May 2019 07:00  --------------------------------------------------------  IN:    TPN (Total Parenteral Nutrition): 913 mL  Total IN: 913 mL    OUT:  Total OUT: 0 mL    Total NET: 913 mL          LABS:                        11.8   3.9   )-----------( 187      ( 01 May 2019 06:47 )             35.7     05-01    136  |  101  |  15.0  ----------------------------<  237<H>  4.2   |  25.0  |  0.41<L>    Ca    8.6      01 May 2019 06:47  Phos  3.7     05-01  Mg     1.8     05-01    TPro  5.7<L>  /  Alb  2.6<L>  /  TBili  <0.2<L>  /  DBili  x   /  AST  9   /  ALT  10  /  AlkPhos  68  05-01          RADIOLOGY & ADDITIONAL STUDIES:

## 2019-05-02 NOTE — PROGRESS NOTE ADULT - ASSESSMENT
A/P: 89yo F p/w GOO and duodenal ulcer; no n/v overnight; NGT removed yesterday  -was put on NPO/IVF after MN for EGD today  -patient will continue to have erythromycin after EGD  -will continue to monitor diet tolerance and f/u EGD results; continue TPN  -GI ppx

## 2019-05-02 NOTE — PROGRESS NOTE ADULT - ASSESSMENT
Mrs. Rincon is a 89F with dementia and DM2 admitted with gastric outlet obstruction. S/P EGD x 2, with most recent on 4/22 with large duodenal ulcer. Pt receiving nutrition via TPN; s/p resolution of obstruction and now tolerating PO diet with assistance with feeding.      PLAN    Gastric Outlet Obstruction - resolved  Duodenal Ulcer  - tolerated clears yesterday, diet advanced to regular  - GI note reviewed, EGD cancelled, TPN to be d/c'ed     Advance Dementia  Protein Calorie Malnutrition/Hypoalbuminemia  - c/w supportive care, frequent reorientation    Palliative Care Encounter  Met with dtr Roxanna and granddaughter Will with assistance of . Reviewed hospital course, baseline functional status, treatment and plan of care moving forward. Family has noticed clinical decline over last 6 months, requires full assist with ADLs including feeding, baseline urinary and fecal incontinence, mostly sedentary/bedbound, minimally conversation often 1-2 word responses with confusion. PTA pt was getting 28hr aide as granddaughter was CDPAP and attending  4 days a week. Family are concerned about being able to provide the level of care needed in home setting, requesting additional aide support. Explained in depth the disease trajectory of dementia, progressive clinical deterioration that may cause increasing needs as seen by family, high risk of rehospitalization. Given her advance dementia, malnutrition, frailty and debility, she is a good candidate for home with hospice services. I explained hospice philosophy and services; they are receptive to hearing more about it. Hospice referral placed.

## 2019-05-02 NOTE — GOALS OF CARE CONVERSATION - PERSONAL ADVANCE DIRECTIVE - CONVERSATION DETAILS
Hospice services discussed with patient's daughter Roxanna. Patient has been living at home with her daughter prior to admission. Patient goes to adult day care 4 days a week. She also has a CD Pap aide from [facility] for 28 hours a week. Daughter does not want to make a decision about hospice services yet. She would like to wait 24 to 48 hours before making a decision. Hospice contact information given to daughter to call if she would like to proceed with hospice services. Rodolfo FRANK
met using  ID#[id number] Betito : explained advance care options of CPR vs allow natural death, life supportive therapies including NIV, intubation/vent, we deferred discussion of feeding tubes at this time because they are part of treatment plans for her gastric obstruction and did not want to confuse anything. Pt's daughter and grandson both expressed concern for pt's advanced age and dementia that her recovery from a large procedure would be difficult if not impossible and they do not want her to suffer any undue pain. In the same vein, they wish to allow for her to have a natural death should she deteriorate further or suffer a cardiac arrest event. They request no mechanical supports- no NIV or vent, no CPR.  Total GOC time spent: 60 minutes

## 2019-05-03 ENCOUNTER — TRANSCRIPTION ENCOUNTER (OUTPATIENT)
Age: 84
End: 2019-05-03

## 2019-05-03 LAB
ANION GAP SERPL CALC-SCNC: 11 MMOL/L — SIGNIFICANT CHANGE UP (ref 5–17)
BASOPHILS # BLD AUTO: 0 K/UL — SIGNIFICANT CHANGE UP (ref 0–0.2)
BASOPHILS NFR BLD AUTO: 1 % — SIGNIFICANT CHANGE UP (ref 0–2)
BUN SERPL-MCNC: 15 MG/DL — SIGNIFICANT CHANGE UP (ref 8–20)
CALCIUM SERPL-MCNC: 8.9 MG/DL — SIGNIFICANT CHANGE UP (ref 8.6–10.2)
CHLORIDE SERPL-SCNC: 99 MMOL/L — SIGNIFICANT CHANGE UP (ref 98–107)
CO2 SERPL-SCNC: 23 MMOL/L — SIGNIFICANT CHANGE UP (ref 22–29)
CREAT SERPL-MCNC: 0.46 MG/DL — LOW (ref 0.5–1.3)
EOSINOPHIL # BLD AUTO: 0.1 K/UL — SIGNIFICANT CHANGE UP (ref 0–0.5)
EOSINOPHIL NFR BLD AUTO: 3 % — SIGNIFICANT CHANGE UP (ref 0–5)
GLUCOSE BLDC GLUCOMTR-MCNC: 158 MG/DL — HIGH (ref 70–99)
GLUCOSE BLDC GLUCOMTR-MCNC: 178 MG/DL — HIGH (ref 70–99)
GLUCOSE BLDC GLUCOMTR-MCNC: 199 MG/DL — HIGH (ref 70–99)
GLUCOSE BLDC GLUCOMTR-MCNC: 214 MG/DL — HIGH (ref 70–99)
GLUCOSE SERPL-MCNC: 218 MG/DL — HIGH (ref 70–115)
HCT VFR BLD CALC: 36.6 % — LOW (ref 37–47)
HGB BLD-MCNC: 11.9 G/DL — LOW (ref 12–16)
LYMPHOCYTES # BLD AUTO: 1 K/UL — SIGNIFICANT CHANGE UP (ref 1–4.8)
LYMPHOCYTES # BLD AUTO: 29 % — SIGNIFICANT CHANGE UP (ref 20–55)
MAGNESIUM SERPL-MCNC: 1.8 MG/DL — SIGNIFICANT CHANGE UP (ref 1.6–2.6)
MCHC RBC-ENTMCNC: 29.9 PG — SIGNIFICANT CHANGE UP (ref 27–31)
MCHC RBC-ENTMCNC: 32.5 G/DL — SIGNIFICANT CHANGE UP (ref 32–36)
MCV RBC AUTO: 92 FL — SIGNIFICANT CHANGE UP (ref 81–99)
MONOCYTES # BLD AUTO: 0.4 K/UL — SIGNIFICANT CHANGE UP (ref 0–0.8)
MONOCYTES NFR BLD AUTO: 11 % — HIGH (ref 3–10)
NEUTROPHILS # BLD AUTO: 1.8 K/UL — SIGNIFICANT CHANGE UP (ref 1.8–8)
NEUTROPHILS NFR BLD AUTO: 53 % — SIGNIFICANT CHANGE UP (ref 37–73)
PHOSPHATE SERPL-MCNC: 3.2 MG/DL — SIGNIFICANT CHANGE UP (ref 2.4–4.7)
PLAT MORPH BLD: NORMAL — SIGNIFICANT CHANGE UP
PLATELET # BLD AUTO: 216 K/UL — SIGNIFICANT CHANGE UP (ref 150–400)
POTASSIUM SERPL-MCNC: 4.5 MMOL/L — SIGNIFICANT CHANGE UP (ref 3.5–5.3)
POTASSIUM SERPL-SCNC: 4.5 MMOL/L — SIGNIFICANT CHANGE UP (ref 3.5–5.3)
RBC # BLD: 3.98 M/UL — LOW (ref 4.4–5.2)
RBC # FLD: 13 % — SIGNIFICANT CHANGE UP (ref 11–15.6)
RBC BLD AUTO: NORMAL — SIGNIFICANT CHANGE UP
SODIUM SERPL-SCNC: 133 MMOL/L — LOW (ref 135–145)
VARIANT LYMPHS # BLD: 3 % — SIGNIFICANT CHANGE UP (ref 0–6)
WBC # BLD: 3.3 K/UL — LOW (ref 4.8–10.8)
WBC # FLD AUTO: 3.3 K/UL — LOW (ref 4.8–10.8)

## 2019-05-03 PROCEDURE — 99232 SBSQ HOSP IP/OBS MODERATE 35: CPT

## 2019-05-03 RX ORDER — PANTOPRAZOLE SODIUM 20 MG/1
1 TABLET, DELAYED RELEASE ORAL
Qty: 180 | Refills: 0 | OUTPATIENT
Start: 2019-05-03 | End: 2019-07-31

## 2019-05-03 RX ORDER — PANTOPRAZOLE SODIUM 20 MG/1
40 TABLET, DELAYED RELEASE ORAL
Qty: 0 | Refills: 0 | Status: DISCONTINUED | OUTPATIENT
Start: 2019-05-03 | End: 2019-05-04

## 2019-05-03 RX ORDER — CHLORHEXIDINE GLUCONATE 213 G/1000ML
1 SOLUTION TOPICAL DAILY
Qty: 0 | Refills: 0 | Status: DISCONTINUED | OUTPATIENT
Start: 2019-05-03 | End: 2019-05-04

## 2019-05-03 RX ADMIN — Medication 4: at 17:22

## 2019-05-03 RX ADMIN — PANTOPRAZOLE SODIUM 40 MILLIGRAM(S): 20 TABLET, DELAYED RELEASE ORAL at 17:22

## 2019-05-03 RX ADMIN — DIPHENHYDRAMINE HYDROCHLORIDE AND LIDOCAINE HYDROCHLORIDE AND ALUMINUM HYDROXIDE AND MAGNESIUM HYDRO 5 MILLILITER(S): KIT at 23:22

## 2019-05-03 RX ADMIN — CHLORHEXIDINE GLUCONATE 1 APPLICATION(S): 213 SOLUTION TOPICAL at 12:39

## 2019-05-03 RX ADMIN — DIPHENHYDRAMINE HYDROCHLORIDE AND LIDOCAINE HYDROCHLORIDE AND ALUMINUM HYDROXIDE AND MAGNESIUM HYDRO 5 MILLILITER(S): KIT at 07:53

## 2019-05-03 RX ADMIN — SODIUM CHLORIDE 50 MILLILITER(S): 9 INJECTION, SOLUTION INTRAVENOUS at 23:22

## 2019-05-03 RX ADMIN — Medication 2: at 23:23

## 2019-05-03 RX ADMIN — CHLORHEXIDINE GLUCONATE 15 MILLILITER(S): 213 SOLUTION TOPICAL at 17:21

## 2019-05-03 RX ADMIN — DIPHENHYDRAMINE HYDROCHLORIDE AND LIDOCAINE HYDROCHLORIDE AND ALUMINUM HYDROXIDE AND MAGNESIUM HYDRO 5 MILLILITER(S): KIT at 12:41

## 2019-05-03 RX ADMIN — Medication 2: at 12:40

## 2019-05-03 RX ADMIN — CHLORHEXIDINE GLUCONATE 15 MILLILITER(S): 213 SOLUTION TOPICAL at 07:52

## 2019-05-03 RX ADMIN — Medication 2: at 07:53

## 2019-05-03 NOTE — DISCHARGE NOTE PROVIDER - NSFOLLOWUPCLINICS_GEN_ALL_ED_FT
[facility] Acute Care Surgery  Acute Care Surgery  [address] [place] [id number]  Phone: [phone]  Fax:   Follow Up Time:

## 2019-05-03 NOTE — DISCHARGE NOTE PROVIDER - NSDCCPCAREPLAN_GEN_ALL_CORE_FT
PRINCIPAL DISCHARGE DIAGNOSIS  Diagnosis: Gastric outlet obstruction  Assessment and Plan of Treatment: Pt may return to regular diet as tolerated, activity as tolerated.  Follow up with Acute Care Surgery Clinic within 2 weeks from discharge and with your PMD within 2 weeks from discharge.  Return to the ED immediately for bleeding per rectum, severe abominal pain, persistent nausea/vomiting, high fever, or any worsening of your condition

## 2019-05-03 NOTE — PROGRESS NOTE ADULT - SUBJECTIVE AND OBJECTIVE BOX
SUBJECTIVE: DANA overnight.  Tolerating clears, however, complains of some pain with swallowing. No n/v/sob/cpfeverschills      MEDICATIONS  (STANDING):  chlorhexidine 0.12% Liquid 15 milliLiter(s) Swish and Spit two times a day  dextrose 5% + sodium chloride 0.9%. 1000 milliLiter(s) (50 mL/Hr) IV Continuous <Continuous>  dextrose 5%. 1000 milliLiter(s) (50 mL/Hr) IV Continuous <Continuous>  dextrose 50% Injectable 12.5 Gram(s) IV Push once  dextrose 50% Injectable 25 Gram(s) IV Push once  dextrose 50% Injectable 25 Gram(s) IV Push once  FIRST- Mouthwash  BLM 5 milliLiter(s) Swish and Spit three times a day  insulin lispro (HumaLOG) corrective regimen sliding scale   SubCutaneous every 6 hours  pantoprazole  Injectable 40 milliGRAM(s) IV Push every 12 hours    MEDICATIONS  (PRN):  dextrose 40% Gel 15 Gram(s) Oral once PRN Blood Glucose LESS THAN 70 milliGRAM(s)/deciliter  glucagon  Injectable 1 milliGRAM(s) IntraMuscular once PRN Glucose LESS THAN 70 milligrams/deciliter  ondansetron Injectable 4 milliGRAM(s) IV Push every 6 hours PRN Nausea and/or Vomiting  sodium chloride 0.9% lock flush 10 milliLiter(s) IV Push every 1 hour PRN Pre/post blood products, medications, blood draw, and to maintain line patency      Vital Signs Last 24 Hrs  T(C): 36.9 (02 May 2019 23:33), Max: 36.9 (02 May 2019 23:33)  T(F): 98.4 (02 May 2019 23:33), Max: 98.4 (02 May 2019 23:33)  HR: 70 (02 May 2019 23:33) (70 - 87)  BP: 107/62 (02 May 2019 23:33) (107/62 - 125/73)  BP(mean): --  RR: 18 (02 May 2019 23:33) (18 - 18)  SpO2: 98% (02 May 2019 23:33) (98% - 99%)    PE  Gen: NAD  Pulm: nonlabored breathing  CV: RRR  Abd: soft, nt, nd  Ext: no cyanosis, clubbing, or edema        I&O's Detail    01 May 2019 07:01  -  02 May 2019 07:00  --------------------------------------------------------  IN:    TPN (Total Parenteral Nutrition): 913 mL  Total IN: 913 mL    OUT:  Total OUT: 0 mL    Total NET: 913 mL          LABS:                        11.8   3.9   )-----------( 187      ( 01 May 2019 06:47 )             35.7     05-02    136  |  102  |  22.0<H>  ----------------------------<  195<H>  4.2   |  24.0  |  0.44<L>    Ca    8.5<L>      02 May 2019 10:25  Phos  3.9     05-02  Mg     1.7     05-02    TPro  5.7<L>  /  Alb  2.6<L>  /  TBili  <0.2<L>  /  DBili  x   /  AST  9   /  ALT  10  /  AlkPhos  68  05-01          RADIOLOGY & ADDITIONAL STUDIES:

## 2019-05-03 NOTE — PROGRESS NOTE ADULT - ASSESSMENT
A/P: 89yo F p/w GOO and duodenal ulcer; which has now resolved    CLD-> advance to regular diet as tolerates  No longer on TPN  Hospice on board, family to make a decision   If tolerating a regular diet today and pain is well controlled, possible d/c this weekend

## 2019-05-03 NOTE — DISCHARGE NOTE PROVIDER - NSDCCPTREATMENT_GEN_ALL_CORE_FT
PRINCIPAL PROCEDURE  Procedure: Esophagogastroduodenoscopy (EGD) with cautery using heater probe  Findings and Treatment:       SECONDARY PROCEDURE  Procedure: EGD with biopsy  Findings and Treatment:

## 2019-05-03 NOTE — DISCHARGE NOTE PROVIDER - HOSPITAL COURSE
89F w/ hx of dementia, DM presenting to ER w/ emesis, decreased po intake and EG pain. Pts family states that for past several weeks pt has had decreased PO intake, prior to this had normal appetite and was eating well. 2 weeks ago she had an episode of bilious emesis and again this morning she had several episodes of emesis. Family denies changes in BMs, melena or hematochezia.     Pt has never had colonoscopy EGD and denies fam hx of cancer.         4/20 CT CAP with markedly distended abd 2/2 to inflammatory process resulting in gastric outlet obstruction in region of pylorus, multiple gallstones seen.  Pt admitted with NGT placed for gastric decompression.  Cards eval for EGD clearance.  Pt taken for EGD on 4/22, noted to have grade D erosive esophagitis and multiple gastric ulcers, vessels at superior aspect of pylorus injected with epinephrine and bipolar cautery w/ hemostasis achieved. Unable to visualize duodenum 2/2 to obstruction.  Pt went for repeat EGD on 4/24 with large duodenal ulcer noted, no active bleeding, bx obtained.          Pt continued to have obstruction, TPN ordered by GI.  Palliative Care involved with family decisions, no further surgical intervention desired.  Pt had gastrograffin study on 4/30 with contrast noted in colon.  Diet started. 89F w/ hx of dementia, DM presenting to ER w/ emesis, decreased po intake and EG pain. Pts family states that for past several weeks pt has had decreased PO intake, prior to this had normal appetite and was eating well. 2 weeks ago she had an episode of bilious emesis and again this morning she had several episodes of emesis. Family denies changes in BMs, melena or hematochezia.     Pt has never had colonoscopy EGD and denies fam hx of cancer.         4/20 CT CAP with markedly distended abd 2/2 to inflammatory process resulting in gastric outlet obstruction in region of pylorus, multiple gallstones seen.  Pt admitted with NGT placed for gastric decompression.  Cards eval for EGD clearance.  Pt taken for EGD on 4/22, noted to have grade D erosive esophagitis and multiple gastric ulcers, vessels at superior aspect of pylorus injected with epinephrine and bipolar cautery w/ hemostasis achieved. Unable to visualize duodenum 2/2 to obstruction.  Pt went for repeat EGD on 4/24 with large duodenal ulcer noted, no active bleeding, bx obtained.          Pt continued to have obstruction, TPN ordered by GI.  Palliative Care involved with family decisions, no further surgical intervention desired.  Pt had gastrograffin study on 4/30 with contrast noted in colon.  Diet started.      Pt tolerated regular diet, having bowel function.  H/H remains stable without further bleeding.    Palliative discussion believes pt it appropriate for home hospice.  TBD.............. 89F w/ hx of dementia, DM presenting to ER w/ emesis, decreased po intake and EG pain. Pts family states that for past several weeks pt has had decreased PO intake, prior to this had normal appetite and was eating well. 2 weeks ago she had an episode of bilious emesis and again this morning she had several episodes of emesis. Family denies changes in BMs, melena or hematochezia.     Pt has never had colonoscopy EGD and denies fam hx of cancer.         4/20 CT CAP with markedly distended abd 2/2 to inflammatory process resulting in gastric outlet obstruction in region of pylorus, multiple gallstones seen.  Pt admitted with NGT placed for gastric decompression.  Cards eval for EGD clearance.  Pt taken for EGD on 4/22, noted to have grade D erosive esophagitis and multiple gastric ulcers, vessels at superior aspect of pylorus injected with epinephrine and bipolar cautery w/ hemostasis achieved. Unable to visualize duodenum 2/2 to obstruction.  Pt went for repeat EGD on 4/24 with large duodenal ulcer noted, no active bleeding, bx obtained.          Pt continued to have obstruction, TPN ordered by GI.  Palliative Care involved with family decisions, no further surgical intervention desired.  Pt had gastrograffin study on 4/30 with contrast noted in colon.  Diet started.      Pt tolerated regular diet, having bowel function.  H/H remains stable without further bleeding. Per GI Gastric outlet obstruction due to large DU has been resolved. Patient is to f/u with Dr. Treadwell as outpatient. Patient is to be discharged on     pantoprazole 40mg PO BID on which she should continue for three months, then 40mg qAM indefinitely.        Palliative discussion believes pt it appropriate for home hospice.  Patient ready for discharge to home with home care through her family.

## 2019-05-03 NOTE — PROGRESS NOTE ADULT - PROBLEM SELECTOR PLAN 1
due to PUD-appears improved clinically-for repeat EGD today
pt pulling out NGT   plan for continued decompression with NGT, TPN via PICC (PICC To be placed today)
pt pulling out NGT   plan for continued decompression with NGT, TPN via PICC (PICC To be placed today)   there are 3 options to proceed from here- 1-pt have surgery antrectomy/jejunostomy 2- venting peg 3- stent   all of these options would be after about a week of TPN/NGT decompression  these options were presented to pt's daughter Roxanna by the surgical team in [place] earlier today
clinically improved with little, if any symptoms at this time. Will change to pantoprazole 40mg PO BID on which she should continue for three months, then 40mg qAM indefinitely.

## 2019-05-03 NOTE — DISCHARGE NOTE PROVIDER - CARE PROVIDERS DIRECT ADDRESSES
[email or web address]

## 2019-05-03 NOTE — PROGRESS NOTE ADULT - PROBLEM SELECTOR PROBLEM 1
Gastric outlet obstruction
Gastric outlet obstruction
Peptic ulcer disease
Gastric outlet obstruction

## 2019-05-03 NOTE — DISCHARGE NOTE PROVIDER - CARE PROVIDER_API CALL
Clif Treadwell)  Gastroenterology; Internal Medicine  [address] [place] [address]  Phone: [phone]  Fax: [phone]  Follow Up Time:

## 2019-05-03 NOTE — PROGRESS NOTE ADULT - SUBJECTIVE AND OBJECTIVE BOX
Pt seen and examined f/u prior DU with partial GOO due to same.    This morning nurse reports patient tolerating regular diet without obvious nause, vomiting or abdominal pain. Off TPN    REVIEW OF SYSTEMS: unable to obtain      MEDICATIONS:  MEDICATIONS  (STANDING):  chlorhexidine 0.12% Liquid 15 milliLiter(s) Swish and Spit two times a day  dextrose 5% + sodium chloride 0.9%. 1000 milliLiter(s) (50 mL/Hr) IV Continuous <Continuous>  dextrose 5%. 1000 milliLiter(s) (50 mL/Hr) IV Continuous <Continuous>  dextrose 50% Injectable 12.5 Gram(s) IV Push once  dextrose 50% Injectable 25 Gram(s) IV Push once  dextrose 50% Injectable 25 Gram(s) IV Push once  FIRST- Mouthwash  BLM 5 milliLiter(s) Swish and Spit three times a day  insulin lispro (HumaLOG) corrective regimen sliding scale   SubCutaneous every 6 hours  pantoprazole    Tablet 40 milliGRAM(s) Oral two times a day    MEDICATIONS  (PRN):  dextrose 40% Gel 15 Gram(s) Oral once PRN Blood Glucose LESS THAN 70 milliGRAM(s)/deciliter  glucagon  Injectable 1 milliGRAM(s) IntraMuscular once PRN Glucose LESS THAN 70 milligrams/deciliter  ondansetron Injectable 4 milliGRAM(s) IV Push every 6 hours PRN Nausea and/or Vomiting  sodium chloride 0.9% lock flush 10 milliLiter(s) IV Push every 1 hour PRN Pre/post blood products, medications, blood draw, and to maintain line patency      Allergies    No Known Allergies    Intolerances        Vital Signs Last 24 Hrs  T(C): 36.9 (02 May 2019 23:33), Max: 36.9 (02 May 2019 23:33)  T(F): 98.4 (02 May 2019 23:33), Max: 98.4 (02 May 2019 23:33)  HR: 70 (02 May 2019 23:33) (70 - 87)  BP: 107/62 (02 May 2019 23:33) (107/62 - 125/73)  BP(mean): --  RR: 18 (02 May 2019 23:33) (18 - 18)  SpO2: 98% (02 May 2019 23:33) (98% - 99%)      PHYSICAL EXAM:    General: elderly  female  well nourished; in no acute distress, minimally communicative  HEENT: MMM, conjunctiva and sclera clear  Gastrointestinal:Abdomen: Soft non-tender non-distended; Normal bowel sounds; No hepatosplenomegaly  Extremities: no cyanosis, clubbing or edema.  Skin: Warm and dry. No obvious rash    LABS:        05-02    136  |  102  |  22.0<H>  ----------------------------<  195<H>  4.2   |  24.0  |  0.44<L>    Ca    8.5<L>      02 May 2019 10:25  Phos  3.9     05-02  Mg     1.7     05-02

## 2019-05-03 NOTE — PROGRESS NOTE ADULT - PROBLEM SELECTOR PLAN 2
due to large DU-resolved. Can f/u with Dr. Treadwell as outpatient. Will see only prn your request.
pt intermittently requiring mittens/soft restraints to prevent removal of NGT  family at bedside frequent reorientation  assist full ADLs
pt intermittently requiring mittens/soft restraints to prevent removal of NGT  family at bedside frequent reorientation  assist full ADLs

## 2019-05-04 ENCOUNTER — TRANSCRIPTION ENCOUNTER (OUTPATIENT)
Age: 84
End: 2019-05-04

## 2019-05-04 VITALS
RESPIRATION RATE: 18 BRPM | SYSTOLIC BLOOD PRESSURE: 132 MMHG | TEMPERATURE: 98 F | HEART RATE: 61 BPM | DIASTOLIC BLOOD PRESSURE: 71 MMHG

## 2019-05-04 LAB
GLUCOSE BLDC GLUCOMTR-MCNC: 162 MG/DL — HIGH (ref 70–99)
GLUCOSE BLDC GLUCOMTR-MCNC: 208 MG/DL — HIGH (ref 70–99)

## 2019-05-04 PROCEDURE — 83690 ASSAY OF LIPASE: CPT

## 2019-05-04 PROCEDURE — 97167 OT EVAL HIGH COMPLEX 60 MIN: CPT

## 2019-05-04 PROCEDURE — 97110 THERAPEUTIC EXERCISES: CPT

## 2019-05-04 PROCEDURE — 74018 RADEX ABDOMEN 1 VIEW: CPT

## 2019-05-04 PROCEDURE — 97116 GAIT TRAINING THERAPY: CPT

## 2019-05-04 PROCEDURE — 86140 C-REACTIVE PROTEIN: CPT

## 2019-05-04 PROCEDURE — 74177 CT ABD & PELVIS W/CONTRAST: CPT

## 2019-05-04 PROCEDURE — 85027 COMPLETE CBC AUTOMATED: CPT

## 2019-05-04 PROCEDURE — 93005 ELECTROCARDIOGRAM TRACING: CPT

## 2019-05-04 PROCEDURE — 85610 PROTHROMBIN TIME: CPT

## 2019-05-04 PROCEDURE — 96375 TX/PRO/DX INJ NEW DRUG ADDON: CPT

## 2019-05-04 PROCEDURE — 99231 SBSQ HOSP IP/OBS SF/LOW 25: CPT

## 2019-05-04 PROCEDURE — 85730 THROMBOPLASTIN TIME PARTIAL: CPT

## 2019-05-04 PROCEDURE — 36415 COLL VENOUS BLD VENIPUNCTURE: CPT

## 2019-05-04 PROCEDURE — 88312 SPECIAL STAINS GROUP 1: CPT

## 2019-05-04 PROCEDURE — 84443 ASSAY THYROID STIM HORMONE: CPT

## 2019-05-04 PROCEDURE — 96374 THER/PROPH/DIAG INJ IV PUSH: CPT

## 2019-05-04 PROCEDURE — T1013: CPT

## 2019-05-04 PROCEDURE — 86850 RBC ANTIBODY SCREEN: CPT

## 2019-05-04 PROCEDURE — 84100 ASSAY OF PHOSPHORUS: CPT

## 2019-05-04 PROCEDURE — 82962 GLUCOSE BLOOD TEST: CPT

## 2019-05-04 PROCEDURE — 80076 HEPATIC FUNCTION PANEL: CPT

## 2019-05-04 PROCEDURE — 96376 TX/PRO/DX INJ SAME DRUG ADON: CPT

## 2019-05-04 PROCEDURE — 80053 COMPREHEN METABOLIC PANEL: CPT

## 2019-05-04 PROCEDURE — 88305 TISSUE EXAM BY PATHOLOGIST: CPT

## 2019-05-04 PROCEDURE — 97535 SELF CARE MNGMENT TRAINING: CPT

## 2019-05-04 PROCEDURE — 97163 PT EVAL HIGH COMPLEX 45 MIN: CPT

## 2019-05-04 PROCEDURE — 99285 EMERGENCY DEPT VISIT HI MDM: CPT | Mod: 25

## 2019-05-04 PROCEDURE — 97530 THERAPEUTIC ACTIVITIES: CPT

## 2019-05-04 PROCEDURE — 82550 ASSAY OF CK (CPK): CPT

## 2019-05-04 PROCEDURE — 84134 ASSAY OF PREALBUMIN: CPT

## 2019-05-04 PROCEDURE — 83735 ASSAY OF MAGNESIUM: CPT

## 2019-05-04 PROCEDURE — 71045 X-RAY EXAM CHEST 1 VIEW: CPT

## 2019-05-04 PROCEDURE — 82330 ASSAY OF CALCIUM: CPT

## 2019-05-04 PROCEDURE — 93306 TTE W/DOPPLER COMPLETE: CPT

## 2019-05-04 PROCEDURE — 86901 BLOOD TYPING SEROLOGIC RH(D): CPT

## 2019-05-04 PROCEDURE — 83036 HEMOGLOBIN GLYCOSYLATED A1C: CPT

## 2019-05-04 PROCEDURE — 96372 THER/PROPH/DIAG INJ SC/IM: CPT | Mod: XU

## 2019-05-04 PROCEDURE — 86900 BLOOD TYPING SEROLOGIC ABO: CPT

## 2019-05-04 PROCEDURE — 80048 BASIC METABOLIC PNL TOTAL CA: CPT

## 2019-05-04 RX ORDER — CHLORHEXIDINE GLUCONATE 213 G/1000ML
200 SOLUTION TOPICAL
Qty: 14 | Refills: 0 | OUTPATIENT
Start: 2019-05-04

## 2019-05-04 RX ORDER — PANTOPRAZOLE SODIUM 20 MG/1
1 TABLET, DELAYED RELEASE ORAL
Qty: 180 | Refills: 0 | OUTPATIENT
Start: 2019-05-04 | End: 2019-08-01

## 2019-05-04 RX ORDER — MEMANTINE HYDROCHLORIDE 10 MG/1
1 TABLET ORAL
Qty: 0 | Refills: 0 | COMMUNITY

## 2019-05-04 RX ORDER — DONEPEZIL HYDROCHLORIDE 10 MG/1
1 TABLET, FILM COATED ORAL
Qty: 0 | Refills: 0 | COMMUNITY

## 2019-05-04 RX ORDER — METFORMIN HYDROCHLORIDE 850 MG/1
1 TABLET ORAL
Qty: 0 | Refills: 0 | COMMUNITY

## 2019-05-04 RX ORDER — MEGESTROL ACETATE 40 MG/ML
20 SUSPENSION ORAL
Qty: 140 | Refills: 0 | OUTPATIENT
Start: 2019-05-04

## 2019-05-04 RX ADMIN — CHLORHEXIDINE GLUCONATE 1 APPLICATION(S): 213 SOLUTION TOPICAL at 12:02

## 2019-05-04 RX ADMIN — DIPHENHYDRAMINE HYDROCHLORIDE AND LIDOCAINE HYDROCHLORIDE AND ALUMINUM HYDROXIDE AND MAGNESIUM HYDRO 5 MILLILITER(S): KIT at 14:09

## 2019-05-04 RX ADMIN — DIPHENHYDRAMINE HYDROCHLORIDE AND LIDOCAINE HYDROCHLORIDE AND ALUMINUM HYDROXIDE AND MAGNESIUM HYDRO 5 MILLILITER(S): KIT at 05:32

## 2019-05-04 RX ADMIN — Medication 2: at 05:32

## 2019-05-04 RX ADMIN — Medication 4: at 12:00

## 2019-05-04 RX ADMIN — CHLORHEXIDINE GLUCONATE 15 MILLILITER(S): 213 SOLUTION TOPICAL at 05:32

## 2019-05-04 RX ADMIN — PANTOPRAZOLE SODIUM 40 MILLIGRAM(S): 20 TABLET, DELAYED RELEASE ORAL at 05:32

## 2019-05-04 NOTE — PROGRESS NOTE ADULT - REASON FOR ADMISSION
Gastric outlet obstruction
Gastric outlet obstruction; Duodenal ulcer disease.
Gastric outlet obstruction
Gastric outlet obstruction; PUD
Gastric outlet obstruction

## 2019-05-04 NOTE — PROGRESS NOTE ADULT - PROVIDER SPECIALTY LIST ADULT
Gastroenterology
Palliative Care
Surgery
Gastroenterology

## 2019-05-04 NOTE — PROGRESS NOTE ADULT - SUBJECTIVE AND OBJECTIVE BOX
INTERVAL HPI/OVERNIGHT EVENTS:    SUBJECTIVE: DANA overnight.  Tolerating mechanical soft. Complains of some mild pain with swallowing. No n/v/sob/cp/fevers/chills      MEDICATIONS  (STANDING):  chlorhexidine 0.12% Liquid 15 milliLiter(s) Swish and Spit two times a day  chlorhexidine 2% Cloths 1 Application(s) Topical daily  dextrose 5% + sodium chloride 0.9%. 1000 milliLiter(s) (50 mL/Hr) IV Continuous <Continuous>  dextrose 5%. 1000 milliLiter(s) (50 mL/Hr) IV Continuous <Continuous>  dextrose 50% Injectable 12.5 Gram(s) IV Push once  dextrose 50% Injectable 25 Gram(s) IV Push once  dextrose 50% Injectable 25 Gram(s) IV Push once  FIRST- Mouthwash  BLM 5 milliLiter(s) Swish and Spit three times a day  insulin lispro (HumaLOG) corrective regimen sliding scale   SubCutaneous every 6 hours  pantoprazole    Tablet 40 milliGRAM(s) Oral two times a day    MEDICATIONS  (PRN):  dextrose 40% Gel 15 Gram(s) Oral once PRN Blood Glucose LESS THAN 70 milliGRAM(s)/deciliter  glucagon  Injectable 1 milliGRAM(s) IntraMuscular once PRN Glucose LESS THAN 70 milligrams/deciliter  ondansetron Injectable 4 milliGRAM(s) IV Push every 6 hours PRN Nausea and/or Vomiting  sodium chloride 0.9% lock flush 10 milliLiter(s) IV Push every 1 hour PRN Pre/post blood products, medications, blood draw, and to maintain line patency      Vital Signs Last 24 Hrs  T(C): 36.7 (04 May 2019 00:01), Max: 36.8 (03 May 2019 08:05)  T(F): 98 (04 May 2019 00:01), Max: 98.2 (03 May 2019 08:05)  HR: 75 (04 May 2019 00:01) (68 - 83)  BP: 134/72 (04 May 2019 00:01) (115/73 - 134/80)  BP(mean): --  RR: 18 (04 May 2019 00:01) (18 - 18)  SpO2: 95% (03 May 2019 15:45) (95% - 96%)    PE  Gen: NAD  Pulm: nonlabored breathing  CV: RRR  Abd: soft, nt, nd  Ext: no cyanosis, clubbing, or edema  Neuro:  AAOx3, no cyanosis or clubbing      I&O's Detail      LABS:                        11.9   3.3   )-----------( 216      ( 03 May 2019 08:24 )             36.6     05-03    133<L>  |  99  |  15.0  ----------------------------<  218<H>  4.5   |  23.0  |  0.46<L>    Ca    8.9      03 May 2019 08:24  Phos  3.2     05-03  Mg     1.8     05-03            RADIOLOGY & ADDITIONAL STUDIES:

## 2019-05-04 NOTE — DISCHARGE NOTE NURSING/CASE MANAGEMENT/SOCIAL WORK - NSDCDPATPORTLINK_GEN_ALL_CORE
You can access the [facility], offered by [facility], by registering with the following website: [email or web address]

## 2019-05-04 NOTE — PROGRESS NOTE ADULT - ASSESSMENT
89yo F p/w GOO and duodenal ulcer; which has now resolved    Plan:  continue mechanical soft  Hospice on board, family to make a decision; awaiting hospice placement  dispo: possible dc today

## 2019-05-06 ENCOUNTER — EMERGENCY (EMERGENCY)
Facility: HOSPITAL | Age: 84
LOS: 1 days | Discharge: HOME | End: 2019-05-06
Payer: MEDICAID

## 2019-05-06 VITALS
WEIGHT: 119.93 LBS | OXYGEN SATURATION: 97 % | TEMPERATURE: 99 F | DIASTOLIC BLOOD PRESSURE: 72 MMHG | SYSTOLIC BLOOD PRESSURE: 112 MMHG | HEART RATE: 83 BPM | HEIGHT: 68 IN | RESPIRATION RATE: 16 BRPM

## 2019-05-06 PROCEDURE — 99282 EMERGENCY DEPT VISIT SF MDM: CPT

## 2019-05-06 PROCEDURE — T1013: CPT

## 2019-05-06 NOTE — ED ADULT TRIAGE NOTE - CHIEF COMPLAINT QUOTE
Pt instructed to come to ER by Dr. Marie to have PICC line removed from RUE. Pt without any complaints.

## 2019-05-06 NOTE — ED PROVIDER NOTE - ATTENDING CONTRIBUTION TO CARE
89 year old woman sent by acute care surgery service for PICC line removal.  No erythema or tenderness to right arm PICC line in placed and removed without complication.

## 2019-05-06 NOTE — ED PROVIDER NOTE - OBJECTIVE STATEMENT
89 year old female with PMHx of dementia presents for PICC removal. Per pt family, pt was here for 2 weeks (dc last Thursday) for gastric outlet obstruction. Last time PICC was used was 5/2. 39mm PICC. Pt spoke to [facility] doctor who told pts family she could come in for removal. No fevers, chills.

## 2019-05-06 NOTE — ED PROVIDER NOTE - SKIN, MLM
Skin normal color for race, warm, dry and intact. No evidence of rash. PICC to R antecubital, no erythema

## 2019-06-27 ENCOUNTER — EMERGENCY (EMERGENCY)
Facility: HOSPITAL | Age: 84
LOS: 1 days | Discharge: HOME | End: 2019-06-27
Attending: EMERGENCY MEDICINE
Payer: MEDICAID

## 2019-06-27 VITALS
HEIGHT: 62 IN | DIASTOLIC BLOOD PRESSURE: 83 MMHG | HEART RATE: 83 BPM | WEIGHT: 126.99 LBS | SYSTOLIC BLOOD PRESSURE: 134 MMHG | OXYGEN SATURATION: 99 % | TEMPERATURE: 99 F | RESPIRATION RATE: 14 BRPM

## 2019-06-27 PROCEDURE — 99283 EMERGENCY DEPT VISIT LOW MDM: CPT

## 2019-06-27 PROCEDURE — T1013: CPT

## 2019-06-27 PROCEDURE — 99282 EMERGENCY DEPT VISIT SF MDM: CPT

## 2019-06-27 NOTE — ED PROVIDER NOTE - MUSCULOSKELETAL MINIMAL EXAM
rl--bl le with simla r girth upper leg and lower leg,  r foot with ulcer  no draiange  no tenderness and lower posterior tibial area  no swlelling  dital nv intact

## 2019-06-27 NOTE — ED PROVIDER NOTE - CLINICAL SUMMARY MEDICAL DECISION MAKING FREE TEXT BOX
pt with distal posterior tibial vein dvt  plan  asa and repeat us in 2 weeks to evaualte for propagation  family expressed understanding of plan.  pt with demenita, could not express undertanding

## 2019-06-27 NOTE — ED PROVIDER NOTE - PROGRESS NOTE DETAILS
long discussion with family in [facility]--expalined needs to have us repeated in 2 weeks to evaluate for propagation. If cannot get as outpt, to come to ed.  Also to start 81 mg asa everyday for below knee dvt.  and to return immediately for signes of pe--discussed with family

## 2019-06-27 NOTE — ED PROVIDER NOTE - OBJECTIVE STATEMENT
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  No cp no sob  pt has non healing ulcer on r foot and recent hospital stay no cp no sob.  pt also has hx of dm.  Pt denies complaints

## 2019-06-27 NOTE — ED ADULT TRIAGE NOTE - CHIEF COMPLAINT QUOTE
Pt with dementia sent in from [facility] after having doppler of RLE for swelling/mass to RLE which shows no visualized flow or compressibility of the mid right distal posterior tibial vein. Pt with no sensory loss of RLE and skin is warm and color is consistent with ethnicity. Pt denies CP or difficulty breathing.

## 2019-07-12 ENCOUNTER — APPOINTMENT (OUTPATIENT)
Dept: NEUROLOGY | Facility: CLINIC | Age: 84
End: 2019-07-12
Payer: MEDICAID

## 2019-07-12 VITALS
DIASTOLIC BLOOD PRESSURE: 70 MMHG | HEIGHT: 63 IN | WEIGHT: 130 LBS | BODY MASS INDEX: 23.04 KG/M2 | SYSTOLIC BLOOD PRESSURE: 130 MMHG

## 2019-07-12 DIAGNOSIS — Z86.39 PERSONAL HISTORY OF OTHER ENDOCRINE, NUTRITIONAL AND METABOLIC DISEASE: ICD-10-CM

## 2019-07-12 DIAGNOSIS — F02.80 ALZHEIMER'S DISEASE WITH LATE ONSET: ICD-10-CM

## 2019-07-12 DIAGNOSIS — Z78.9 OTHER SPECIFIED HEALTH STATUS: ICD-10-CM

## 2019-07-12 DIAGNOSIS — G30.1 ALZHEIMER'S DISEASE WITH LATE ONSET: ICD-10-CM

## 2019-07-12 PROCEDURE — 99204 OFFICE O/P NEW MOD 45 MIN: CPT

## 2019-07-12 RX ORDER — OMEPRAZOLE 40 MG/1
40 CAPSULE, DELAYED RELEASE ORAL
Refills: 0 | Status: ACTIVE | COMMUNITY

## 2019-07-12 RX ORDER — MEMANTINE HYDROCHLORIDE 5 MG-10 MG
KIT ORAL
Refills: 0 | Status: ACTIVE | COMMUNITY

## 2019-07-12 RX ORDER — GABAPENTIN 300 MG/1
300 CAPSULE ORAL
Refills: 0 | Status: ACTIVE | COMMUNITY

## 2019-07-12 RX ORDER — DOCUSATE SODIUM 100 MG/1
100 CAPSULE ORAL
Refills: 0 | Status: ACTIVE | COMMUNITY

## 2019-07-12 RX ORDER — METFORMIN HYDROCHLORIDE 1000 MG/1
1000 TABLET, COATED ORAL
Refills: 0 | Status: ACTIVE | COMMUNITY

## 2019-07-12 RX ORDER — PANTOPRAZOLE 40 MG/1
40 TABLET, DELAYED RELEASE ORAL
Refills: 0 | Status: ACTIVE | COMMUNITY

## 2019-07-12 RX ORDER — DONEPEZIL HYDROCHLORIDE 10 MG/1
10 TABLET ORAL
Refills: 0 | Status: ACTIVE | COMMUNITY

## 2019-07-12 RX ORDER — INSULIN GLARGINE 100 [IU]/ML
INJECTION, SOLUTION SUBCUTANEOUS
Refills: 0 | Status: ACTIVE | COMMUNITY

## 2019-07-12 NOTE — REVIEW OF SYSTEMS
[Feeling Tired] : feeling tired [Confused or Disoriented] : confusion [Memory Lapses or Loss] : memory loss [As Noted in HPI] : as noted in HPI [Decr. Concentrating Ability] : decreased concentrating ability [Difficulty Walking] : difficulty walking [Difficulty with Language] : ~M difficulty with language [Negative] : Heme/Lymph

## 2019-07-12 NOTE — CONSULT LETTER
[Dear  ___] : Dear ~JANIS, [Consult Letter:] : I had the pleasure of evaluating your patient, [unfilled]. [Sincerely,] : Sincerely, [Consult Closing:] : Thank you very much for allowing me to participate in the care of this patient.  If you have any questions, please do not hesitate to contact me. [Please see my note below.] : Please see my note below. [FreeTextEntry3] : Saji Fletcher M.D., Ph.D. DPN-N\par [facility]\par Neurology at [place]\par Medical Director of Stroke Services\par [facility]\par

## 2019-07-12 NOTE — ASSESSMENT
[FreeTextEntry1] : This is a 89-year-old woman with advanced Alzheimer's disease. At this point there is little to offer her. The daughter asked if she could stop taking the Aricept and Namenda. At this point it doesn't appear to be benefiting her tremendously so I told him to stop the medication if they desire. I will see her back in the office if necessary, however this time there is little for me to offer.

## 2019-07-12 NOTE — HISTORY OF PRESENT ILLNESS
[FreeTextEntry1] : Initial office visit July 12, 2019:\par This is a 89-year-old woman who presents for evaluation of dementia. Her daughter is here and provides history. He was diagnosed with Alzheimer disease at least 5 years ago. She is not seeing a neurologist in 2 years. She's been maintained on donepezil 10 mg daily and Namenda 10 mg twice a day. Despite this she is getting progressively worse. She is lethargic most of the day. She's having any behavioral outbursts. She is not getting up and wandering. She does have problems with walking. She recently had issues with her stomach and the veins of the dementia medicines for this. She is here today for neurologic evaluation.

## 2019-07-12 NOTE — REASON FOR VISIT
[FreeTextEntry1] : dementia [Consultation] : a consultation visit [Other: _____] : [unfilled] [FreeTextEntry2] : Alba

## 2019-07-12 NOTE — PHYSICAL EXAM
[General Appearance - In No Acute Distress] : in no acute distress [General Appearance - Well Nourished] : well nourished [General Appearance - Well Developed] : well developed [Person] : oriented to person [Place] : disoriented to place [Time] : disoriented to time [Short Term Intact] : short term memory impaired [Remote Intact] : remote memory impaired [Registration Intact] : recent registration memory impaired [Span Intact] : the attention span was decreased [Concentration Intact] : a decrease in concentrating ability was observed [Visual Intact] : visual attention was ~T ~L decreased [Naming Objects] : difficulty naming common objects [Repeating Phrases] : difficulty repeating a phrase [Fluency] : fluency not intact [Comprehension] : comprehension intact [Current Events] : inadequate knowledge of current events [Past History] : inadequate knowledge of personal past history [Cranial Nerves Optic (II)] : visual acuity intact bilaterally,  visual fields full to confrontation, pupils equal round and reactive to light [Cranial Nerves Oculomotor (III)] : extraocular motion intact [Cranial Nerves Trigeminal (V)] : facial sensation intact symmetrically [Cranial Nerves Facial (VII)] : face symmetrical [Cranial Nerves Glossopharyngeal (IX)] : tongue and palate midline [Cranial Nerves Vestibulocochlear (VIII)] : hearing was intact bilaterally [Cranial Nerves Accessory (XI - Cranial And Spinal)] : head turning and shoulder shrug symmetric [Cranial Nerves Hypoglossal (XII)] : there was no tongue deviation with protrusion [No Muscle Atrophy] : normal bulk in all four extremities [Sensation Vibration Decrease] : vibration was intact [Sensation Pain / Temperature Decrease] : pain and temperature was intact [Sensation Tactile Decrease] : light touch was intact [Coordination - Dysmetria Impaired Finger-to-Nose Bilateral] : not present [1+] : Patella left 1+ [FreeTextEntry6] : Diffuse nonfocal weakness [FreeTextEntry8] : Needed assistance of her daughter to be steadied to walk, usually use walker but they forgot it today. 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

## 2019-07-22 ENCOUNTER — APPOINTMENT (OUTPATIENT)
Dept: VASCULAR SURGERY | Facility: CLINIC | Age: 84
End: 2019-07-22
Payer: MEDICAID

## 2019-07-22 DIAGNOSIS — M79.604 PAIN IN RIGHT LEG: ICD-10-CM

## 2019-07-22 DIAGNOSIS — I82.441 ACUTE EMBOLISM AND THROMBOSIS OF RIGHT TIBIAL VEIN: ICD-10-CM

## 2019-07-22 PROCEDURE — 99203 OFFICE O/P NEW LOW 30 MIN: CPT

## 2020-08-31 NOTE — ED ADULT TRIAGE NOTE - MODE OF ARRIVAL
Walk in Graft Donor Site Bandage (Optional-Leave Blank If You Don't Want In Note): Steri-strips and a pressure bandage were applied to the donor site.

## 2023-01-06 ENCOUNTER — OFFICE (OUTPATIENT)
Dept: URBAN - METROPOLITAN AREA CLINIC 94 | Facility: CLINIC | Age: 88
Setting detail: OPHTHALMOLOGY
End: 2023-01-06
Payer: MEDICAID

## 2023-01-06 DIAGNOSIS — Z96.1: ICD-10-CM

## 2023-01-06 DIAGNOSIS — H40.1122: ICD-10-CM

## 2023-01-06 DIAGNOSIS — H40.1112: ICD-10-CM

## 2023-01-06 DIAGNOSIS — H04.122: ICD-10-CM

## 2023-01-06 PROCEDURE — 92012 INTRM OPH EXAM EST PATIENT: CPT | Performed by: OPHTHALMOLOGY

## 2023-01-06 ASSESSMENT — REFRACTION_CURRENTRX
OD_CYLINDER: -2.50
OS_VPRISM_DIRECTION: SV
OS_CYLINDER: -1.75
OS_OVR_VA: 20/
OS_ADD: +3.25
OD_AXIS: 108
OS_CYLINDER: -2.50
OD_CYLINDER: -2.25
OS_AXIS: 108
OD_OVR_VA: 20/
OS_VPRISM_DIRECTION: BF
OD_SPHERE: PLANO
OD_CYLINDER: -2.25
OS_SPHERE: +0.50
OD_OVR_VA: 20/
OD_VPRISM_DIRECTION: SV
OS_SPHERE: +1.00
OD_VPRISM_DIRECTION: SV
OD_SPHERE: +0.50
OD_SPHERE: PLANO
OD_SPHERE: PLANO
OS_VPRISM_DIRECTION: SV
OS_CYLINDER: -2.50
OD_OVR_VA: 20/
OD_CYLINDER: -1.75
OS_CYLINDER: -2.75
OD_AXIS: 108
OS_SPHERE: +1.00
OD_VPRISM_DIRECTION: BF
OS_OVR_VA: 20/
OS_AXIS: 103
OD_ADD: +3.00
OS_SPHERE: +1.00
OS_AXIS: 104
OD_AXIS: 109
OS_OVR_VA: 20/
OD_AXIS: 106
OS_AXIS: 099

## 2023-01-06 ASSESSMENT — REFRACTION_MANIFEST
OS_VA1: 20/80+
OS_ADD: +2.75
OS_CYLINDER: -2.75
OS_SPHERE: +1.00
OS_AXIS: 100
OS_ADD: +350
OS_VA1: 20/40
OS_CYLINDER: -2.50
OS_AXIS: 105
OD_ADD: +2.75
OS_SPHERE: +1.25
OD_SPHERE: BALANCE

## 2023-01-06 ASSESSMENT — PACHYMETRY
OS_CT_UM: 580
OS_CT_CORRECTION: -3
OD_CT_UM: 610
OD_CT_CORRECTION: -5

## 2023-01-06 ASSESSMENT — VISUAL ACUITY
OD_BCVA: 20/100+1
OS_BCVA: LP

## 2023-01-06 ASSESSMENT — TONOMETRY
OS_IOP_MMHG: 12
OD_IOP_MMHG: 12

## 2023-01-06 ASSESSMENT — SUPERFICIAL PUNCTATE KERATITIS (SPK): OS_SPK: T

## 2023-01-06 ASSESSMENT — SPHEQUIV_DERIVED
OS_SPHEQUIV: -0.125
OS_SPHEQUIV: -0.25

## 2023-01-06 ASSESSMENT — REFRACTION_AUTOREFRACTION
OD_SPHERE: UTP
OS_SPHERE: UTP

## 2023-01-06 ASSESSMENT — CORNEAL EDEMA - MICROCYSTIC EPITHELIAL EDEMA (MCE): OD_MCE: ABSENT

## 2023-01-06 ASSESSMENT — KERATOMETRY
OD_K1POWER_DIOPTERS: UTP
OS_K1POWER_DIOPTERS: UTP

## 2023-01-06 ASSESSMENT — CORNEAL DYSTROPHY - POSTERIOR: OS_POSTERIORDYSTROPHY: T FUCHS

## 2023-01-06 ASSESSMENT — CONFRONTATIONAL VISUAL FIELD TEST (CVF)
OS_FINDINGS: FULL
OD_FINDINGS: FULL

## 2023-01-06 ASSESSMENT — LID POSITION - PTOSIS: OD_PTOSIS: +1

## 2023-06-20 ENCOUNTER — OFFICE (OUTPATIENT)
Dept: URBAN - METROPOLITAN AREA CLINIC 112 | Facility: CLINIC | Age: 88
Setting detail: OPHTHALMOLOGY
End: 2023-06-20
Payer: MEDICAID

## 2023-06-20 DIAGNOSIS — H04.122: ICD-10-CM

## 2023-06-20 DIAGNOSIS — H40.1122: ICD-10-CM

## 2023-06-20 DIAGNOSIS — H04.123: ICD-10-CM

## 2023-06-20 DIAGNOSIS — H52.4: ICD-10-CM

## 2023-06-20 DIAGNOSIS — H16.9: ICD-10-CM

## 2023-06-20 DIAGNOSIS — Z96.1: ICD-10-CM

## 2023-06-20 DIAGNOSIS — H04.121: ICD-10-CM

## 2023-06-20 DIAGNOSIS — H40.1112: ICD-10-CM

## 2023-06-20 PROCEDURE — 99213 OFFICE O/P EST LOW 20 MIN: CPT | Performed by: OPHTHALMOLOGY

## 2023-06-20 PROCEDURE — 92015 DETERMINE REFRACTIVE STATE: CPT | Performed by: OPHTHALMOLOGY

## 2023-06-20 PROCEDURE — 92133 CPTRZD OPH DX IMG PST SGM ON: CPT | Performed by: OPHTHALMOLOGY

## 2023-06-20 PROCEDURE — 83861 MICROFLUID ANALY TEARS: CPT | Performed by: OPHTHALMOLOGY

## 2023-06-20 ASSESSMENT — PACHYMETRY
OD_CT_UM: 610
OD_CT_CORRECTION: -5
OS_CT_UM: 580
OS_CT_CORRECTION: -3

## 2023-06-20 ASSESSMENT — SUPERFICIAL PUNCTATE KERATITIS (SPK)
OD_SPK: 3+ 4+
OS_SPK: T

## 2023-06-20 ASSESSMENT — LID POSITION - PTOSIS: OD_PTOSIS: +1

## 2023-06-20 ASSESSMENT — CONFRONTATIONAL VISUAL FIELD TEST (CVF)
OD_FINDINGS: CONSTRICTION
OS_FINDINGS: FULL

## 2023-06-20 ASSESSMENT — CORNEAL DYSTROPHY - POSTERIOR: OS_POSTERIORDYSTROPHY: T FUCHS

## 2023-06-20 ASSESSMENT — CORNEAL EDEMA - MICROCYSTIC EPITHELIAL EDEMA (MCE): OD_MCE: ABSENT

## 2023-06-22 ENCOUNTER — RX ONLY (RX ONLY)
Age: 88
End: 2023-06-22

## 2023-06-22 ENCOUNTER — OFFICE (OUTPATIENT)
Dept: URBAN - METROPOLITAN AREA CLINIC 112 | Facility: CLINIC | Age: 88
Setting detail: OPHTHALMOLOGY
End: 2023-06-22
Payer: MEDICAID

## 2023-06-22 DIAGNOSIS — H16.9: ICD-10-CM

## 2023-06-22 DIAGNOSIS — H40.1112: ICD-10-CM

## 2023-06-22 DIAGNOSIS — Z96.1: ICD-10-CM

## 2023-06-22 DIAGNOSIS — H16.223: ICD-10-CM

## 2023-06-22 PROCEDURE — 99212 OFFICE O/P EST SF 10 MIN: CPT | Performed by: PHYSICIAN ASSISTANT

## 2023-06-22 ASSESSMENT — KERATOMETRY
OD_K1POWER_DIOPTERS: UTP
OS_AXISANGLE_DEGREES: 016
OS_K1POWER_DIOPTERS: 37.50
OS_K1POWER_DIOPTERS: 37.75
OD_K1POWER_DIOPTERS: UTP
OS_K2POWER_DIOPTERS: 42.75
OS_AXISANGLE_DEGREES: 018
OS_K2POWER_DIOPTERS: 41.50

## 2023-06-22 ASSESSMENT — AXIALLENGTH_DERIVED
OS_AL: 25.0642
OS_AL: 24.7386
OS_AL: 24.9547
OS_AL: 25.2732
OS_AL: 25.1619
OS_AL: 24.8879

## 2023-06-22 ASSESSMENT — REFRACTION_CURRENTRX
OS_AXIS: 108
OS_AXIS: 103
OD_SPHERE: +0.50
OD_VPRISM_DIRECTION: SV
OS_VPRISM_DIRECTION: BF
OD_CYLINDER: -1.75
OD_SPHERE: PLANO
OD_CYLINDER: -1.75
OD_CYLINDER: -2.25
OS_CYLINDER: -2.75
OD_AXIS: 108
OD_CYLINDER: -2.25
OS_OVR_VA: 20/
OS_AXIS: 099
OS_AXIS: 104
OD_VPRISM_DIRECTION: SV
OS_VPRISM_DIRECTION: SV
OD_OVR_VA: 20/
OS_ADD: +3.25
OD_VPRISM_DIRECTION: BF
OS_OVR_VA: 20/
OD_CYLINDER: -2.25
OD_VPRISM_DIRECTION: SV
OS_CYLINDER: -2.75
OS_OVR_VA: 20/
OD_CYLINDER: -2.50
OS_VPRISM_DIRECTION: SV
OD_OVR_VA: 20/
OS_CYLINDER: -2.50
OD_OVR_VA: 20/
OD_AXIS: 106
OS_AXIS: 103
OS_SPHERE: +1.00
OD_AXIS: 108
OS_VPRISM_DIRECTION: BF
OD_VPRISM_DIRECTION: BF
OD_SPHERE: PLANO
OS_CYLINDER: -2.50
OS_OVR_VA: 20/
OS_SPHERE: +0.50
OD_SPHERE: PLANO
OS_SPHERE: +0.50
OD_AXIS: 109
OD_CYLINDER: -2.50
OD_SPHERE: PLANO
OS_SPHERE: +1.00
OS_OVR_VA: 20/
OD_SPHERE: PLANO
OS_AXIS: 099
OD_AXIS: 108
OD_OVR_VA: 20/
OS_SPHERE: +1.00
OS_OVR_VA: 20/
OD_ADD: +3.00
OS_VPRISM_DIRECTION: SV
OS_VPRISM_DIRECTION: SV
OD_VPRISM_DIRECTION: SV
OD_OVR_VA: 20/
OD_AXIS: 109
OS_SPHERE: +1.00
OS_CYLINDER: -2.50
OD_AXIS: 106
OD_ADD: +3.00
OS_CYLINDER: -1.75
OS_CYLINDER: -2.50
OD_SPHERE: +0.50
OS_SPHERE: +1.00
OS_SPHERE: +1.00
OD_AXIS: 108
OS_AXIS: 104
OD_SPHERE: PLANO
OD_OVR_VA: 20/
OD_CYLINDER: -2.25
OS_CYLINDER: -1.75
OS_AXIS: 108
OS_ADD: +3.25

## 2023-06-22 ASSESSMENT — REFRACTION_AUTOREFRACTION
OS_AXIS: 104
OS_AXIS: 098
OS_SPHERE: +4.00
OS_CYLINDER: -4.50
OD_SPHERE: UTP
OD_SPHERE: UTP
OS_CYLINDER: -7.25
OS_SPHERE: +2.75

## 2023-06-22 ASSESSMENT — REFRACTION_MANIFEST
OD_ADD: +3.00
OS_VA1: 20/40-2
OS_CYLINDER: -2.75
OS_AXIS: 105
OS_ADD: +3.00
OS_CYLINDER: -2.75
OD_ADD: +3.00
OS_CYLINDER: -2.75
OS_ADD: +350
OS_SPHERE: +1.00
OS_AXIS: 105
OS_CYLINDER: -2.75
OS_ADD: +3.00
OS_VA1: 20/40-2
OS_SPHERE: +1.00
OS_ADD: +350
OS_AXIS: 100
OS_AXIS: 100
OS_VA1: 20/80+
OS_VA1: 20/80+
OD_SPHERE: PLANO
OS_SPHERE: +1.25
OD_SPHERE: PLANO
OS_SPHERE: +1.25

## 2023-06-22 ASSESSMENT — PACHYMETRY
OD_CT_UM: 610
OS_CT_CORRECTION: -3
OS_CT_UM: 580
OD_CT_CORRECTION: -5

## 2023-06-22 ASSESSMENT — SPHEQUIV_DERIVED
OS_SPHEQUIV: -0.125
OS_SPHEQUIV: -0.125
OS_SPHEQUIV: 0.5
OS_SPHEQUIV: 0.375
OS_SPHEQUIV: -0.375
OS_SPHEQUIV: -0.375

## 2023-06-22 ASSESSMENT — CORNEAL DYSTROPHY - POSTERIOR: OS_POSTERIORDYSTROPHY: T FUCHS

## 2023-06-22 ASSESSMENT — CONFRONTATIONAL VISUAL FIELD TEST (CVF)
OD_FINDINGS: FULL
OS_FINDINGS: FULL

## 2023-06-22 ASSESSMENT — VISUAL ACUITY
OS_BCVA: LP
OD_BCVA: 20/80
OS_BCVA: LP
OD_BCVA: 20/50

## 2023-06-22 ASSESSMENT — SUPERFICIAL PUNCTATE KERATITIS (SPK)
OD_SPK: 3+ 4+
OS_SPK: T

## 2023-06-22 ASSESSMENT — CORNEAL EDEMA - MICROCYSTIC EPITHELIAL EDEMA (MCE): OD_MCE: ABSENT

## 2023-06-22 ASSESSMENT — LID POSITION - PTOSIS: OD_PTOSIS: +1

## 2023-07-25 NOTE — ED ADULT NURSE NOTE - NS ED NURSE RECORD ANOTHER VITAL SIGN
[Joint Stiffness] : joint stiffness [Difficulty Walking] : difficulty walking [Fever] : no fever [Chills] : no chills [Sore Throat] : no sore throat [Hoarseness] : no hoarseness [Chest Pain] : no chest pain [Palpitations] : no palpitations [Cough] : no cough [SOB on Exertion] : no shortness of breath during exertion [Joint Swelling] : no joint swelling [Feelings Of Weakness] : no feelings of weakness [Easy Bleeding] : no tendency for easy bleeding [Easy Bruising] : no tendency for easy bruising Yes [de-identified] : Low mood

## 2023-09-19 ENCOUNTER — OFFICE (OUTPATIENT)
Dept: URBAN - METROPOLITAN AREA CLINIC 112 | Facility: CLINIC | Age: 88
Setting detail: OPHTHALMOLOGY
End: 2023-09-19
Payer: MEDICAID

## 2023-09-19 DIAGNOSIS — H16.223: ICD-10-CM

## 2023-09-19 DIAGNOSIS — H40.1122: ICD-10-CM

## 2023-09-19 DIAGNOSIS — H16.9: ICD-10-CM

## 2023-09-19 DIAGNOSIS — H40.1112: ICD-10-CM

## 2023-09-19 PROCEDURE — 92014 COMPRE OPH EXAM EST PT 1/>: CPT | Performed by: OPHTHALMOLOGY

## 2023-09-19 PROCEDURE — 92250 FUNDUS PHOTOGRAPHY W/I&R: CPT | Performed by: OPHTHALMOLOGY

## 2023-09-19 ASSESSMENT — REFRACTION_MANIFEST
OS_CYLINDER: -2.75
OD_SPHERE: PLANO
OS_VA1: 20/40-2
OS_CYLINDER: -2.75
OS_ADD: +350
OS_SPHERE: +1.00
OS_ADD: +3.00
OS_AXIS: 100
OS_SPHERE: +1.25
OS_AXIS: 105
OS_VA1: 20/80+
OD_ADD: +3.00

## 2023-09-19 ASSESSMENT — PACHYMETRY
OD_CT_UM: 610
OS_CT_UM: 580
OS_CT_CORRECTION: -3
OD_CT_CORRECTION: -5

## 2023-09-19 ASSESSMENT — REFRACTION_CURRENTRX
OS_AXIS: 104
OS_VPRISM_DIRECTION: SV
OS_CYLINDER: -2.75
OS_VPRISM_DIRECTION: SV
OD_CYLINDER: -2.25
OS_CYLINDER: -1.75
OS_CYLINDER: -2.50
OD_AXIS: 108
OD_CYLINDER: -2.50
OS_SPHERE: +1.00
OD_ADD: +3.00
OD_AXIS: 108
OS_ADD: +3.25
OS_AXIS: 099
OD_CYLINDER: -1.75
OD_OVR_VA: 20/
OD_VPRISM_DIRECTION: SV
OS_SPHERE: +1.00
OD_SPHERE: PLANO
OS_AXIS: 103
OS_AXIS: 108
OD_SPHERE: +0.50
OD_AXIS: 106
OS_SPHERE: +0.50
OD_AXIS: 109
OS_OVR_VA: 20/
OD_OVR_VA: 20/
OD_SPHERE: PLANO
OD_CYLINDER: -2.25
OS_OVR_VA: 20/
OD_SPHERE: PLANO
OS_CYLINDER: -2.50
OD_VPRISM_DIRECTION: BF
OS_OVR_VA: 20/
OS_VPRISM_DIRECTION: BF
OD_VPRISM_DIRECTION: SV
OD_OVR_VA: 20/
OS_SPHERE: +1.00

## 2023-09-19 ASSESSMENT — REFRACTION_AUTOREFRACTION
OS_CYLINDER: -8.00
OD_SPHERE: UTP
OS_AXIS: 105
OS_SPHERE: +4.00

## 2023-09-19 ASSESSMENT — TONOMETRY: OD_IOP_MMHG: 11

## 2023-09-19 ASSESSMENT — KERATOMETRY
OS_K2POWER_DIOPTERS: 41.50
OS_AXISANGLE_DEGREES: 016
OD_K1POWER_DIOPTERS: UTP
OS_K1POWER_DIOPTERS: 37.75

## 2023-09-19 ASSESSMENT — CORNEAL EDEMA - MICROCYSTIC EPITHELIAL EDEMA (MCE): OD_MCE: ABSENT

## 2023-09-19 ASSESSMENT — SPHEQUIV_DERIVED
OS_SPHEQUIV: -0.375
OS_SPHEQUIV: 0
OS_SPHEQUIV: -0.125

## 2023-09-19 ASSESSMENT — CONFRONTATIONAL VISUAL FIELD TEST (CVF)
OD_FINDINGS: FULL
OS_FINDINGS: FULL

## 2023-09-19 ASSESSMENT — AXIALLENGTH_DERIVED
OS_AL: 25.1619
OS_AL: 25.1066
OS_AL: 25.2732

## 2023-09-19 ASSESSMENT — SUPERFICIAL PUNCTATE KERATITIS (SPK)
OS_SPK: T
OD_SPK: 3+ 4+

## 2023-09-19 ASSESSMENT — CORNEAL DYSTROPHY - POSTERIOR: OS_POSTERIORDYSTROPHY: T FUCHS

## 2023-09-19 ASSESSMENT — VISUAL ACUITY
OS_BCVA: LP
OD_BCVA: 20/80-1

## 2023-09-19 ASSESSMENT — LID POSITION - PTOSIS: OD_PTOSIS: +1

## 2023-10-26 ENCOUNTER — OFFICE (OUTPATIENT)
Dept: URBAN - METROPOLITAN AREA CLINIC 112 | Facility: CLINIC | Age: 88
Setting detail: OPHTHALMOLOGY
End: 2023-10-26
Payer: MEDICAID

## 2023-10-26 DIAGNOSIS — H40.1132: ICD-10-CM

## 2023-10-26 PROCEDURE — 99212 OFFICE O/P EST SF 10 MIN: CPT | Performed by: PHYSICIAN ASSISTANT

## 2023-10-26 ASSESSMENT — REFRACTION_CURRENTRX
OS_CYLINDER: -1.75
OD_SPHERE: PLANO
OS_AXIS: 103
OD_CYLINDER: -2.25
OS_AXIS: 104
OD_AXIS: 109
OS_AXIS: 099
OD_VPRISM_DIRECTION: BF
OS_SPHERE: +1.00
OD_SPHERE: PLANO
OD_CYLINDER: -1.75
OD_VPRISM_DIRECTION: SV
OS_CYLINDER: -2.50
OS_SPHERE: +1.00
OD_CYLINDER: -2.50
OS_VPRISM_DIRECTION: SV
OD_SPHERE: +0.50
OS_ADD: +3.25
OD_SPHERE: PLANO
OS_VPRISM_DIRECTION: SV
OS_OVR_VA: 20/
OD_OVR_VA: 20/
OD_ADD: +3.00
OD_AXIS: 106
OS_SPHERE: +1.00
OD_VPRISM_DIRECTION: SV
OD_OVR_VA: 20/
OS_OVR_VA: 20/
OD_OVR_VA: 20/
OD_AXIS: 108
OD_AXIS: 108
OS_OVR_VA: 20/
OS_CYLINDER: -2.75
OS_SPHERE: +0.50
OD_CYLINDER: -2.25
OS_CYLINDER: -2.50
OS_AXIS: 108
OS_VPRISM_DIRECTION: BF

## 2023-10-26 ASSESSMENT — REFRACTION_MANIFEST
OD_ADD: +3.00
OD_SPHERE: PLANO
OS_CYLINDER: -2.75
OS_CYLINDER: -2.75
OS_ADD: +3.00
OS_AXIS: 100
OS_ADD: +350
OS_AXIS: 105
OS_SPHERE: +1.00
OS_VA1: 20/80+
OS_VA1: 20/40-2
OS_SPHERE: +1.25

## 2023-10-26 ASSESSMENT — LID POSITION - PTOSIS: OD_PTOSIS: +1

## 2023-10-26 ASSESSMENT — REFRACTION_AUTOREFRACTION
OS_SPHERE: +4.00
OD_SPHERE: UTP
OS_AXIS: 106
OS_CYLINDER: -6.75

## 2023-10-26 ASSESSMENT — TONOMETRY
OS_IOP_MMHG: 14
OD_IOP_MMHG: 14

## 2023-10-26 ASSESSMENT — PACHYMETRY
OS_CT_UM: 580
OD_CT_UM: 610
OS_CT_CORRECTION: -3
OD_CT_CORRECTION: -5

## 2023-10-26 ASSESSMENT — VISUAL ACUITY
OS_BCVA: LP
OD_BCVA: 20/80-1

## 2023-10-26 ASSESSMENT — AXIALLENGTH_DERIVED
OS_AL: 25.0062
OS_AL: 25.1161
OS_AL: 24.6821

## 2023-10-26 ASSESSMENT — SPHEQUIV_DERIVED
OS_SPHEQUIV: -0.375
OS_SPHEQUIV: 0.625
OS_SPHEQUIV: -0.125

## 2023-10-26 ASSESSMENT — SUPERFICIAL PUNCTATE KERATITIS (SPK)
OD_SPK: 3+ 4+
OS_SPK: T

## 2023-10-26 ASSESSMENT — KERATOMETRY
OD_K1POWER_DIOPTERS: UTP
OS_K2POWER_DIOPTERS: 42.25
OS_K1POWER_DIOPTERS: 37.75
OS_AXISANGLE_DEGREES: 019

## 2023-10-26 ASSESSMENT — CONFRONTATIONAL VISUAL FIELD TEST (CVF)
OD_FINDINGS: FULL
OS_FINDINGS: FULL

## 2023-10-26 ASSESSMENT — CORNEAL EDEMA - MICROCYSTIC EPITHELIAL EDEMA (MCE): OD_MCE: ABSENT

## 2023-10-26 ASSESSMENT — CORNEAL DYSTROPHY - POSTERIOR: OS_POSTERIORDYSTROPHY: T FUCHS

## 2023-12-08 NOTE — PATIENT PROFILE ADULT - NSPROPTRIGHTSUPPORTPERSON_GEN_A_NUR
Patient arrives from home via squad with complaints of mid upper abdominal pain, nausea, vomiting and diarrhea since around 3am today.   
same name as above

## 2024-02-15 ENCOUNTER — OFFICE (OUTPATIENT)
Dept: URBAN - METROPOLITAN AREA CLINIC 112 | Facility: CLINIC | Age: 89
Setting detail: OPHTHALMOLOGY
End: 2024-02-15
Payer: MEDICAID

## 2024-02-15 DIAGNOSIS — H40.1122: ICD-10-CM

## 2024-02-15 DIAGNOSIS — H40.1112: ICD-10-CM

## 2024-02-15 PROCEDURE — 92014 COMPRE OPH EXAM EST PT 1/>: CPT | Performed by: PHYSICIAN ASSISTANT

## 2024-02-15 ASSESSMENT — REFRACTION_CURRENTRX
OS_AXIS: 104
OD_CYLINDER: -2.50
OD_CYLINDER: -2.25
OS_ADD: +3.25
OS_AXIS: 099
OS_SPHERE: +1.00
OS_AXIS: 103
OD_AXIS: 108
OD_SPHERE: PLANO
OD_OVR_VA: 20/
OD_CYLINDER: -2.25
OD_OVR_VA: 20/
OD_SPHERE: PLANO
OS_SPHERE: +1.00
OD_VPRISM_DIRECTION: SV
OS_SPHERE: +1.00
OS_CYLINDER: -1.75
OD_OVR_VA: 20/
OS_CYLINDER: -2.50
OS_SPHERE: +0.50
OD_SPHERE: +0.50
OD_SPHERE: PLANO
OS_OVR_VA: 20/
OD_AXIS: 109
OS_VPRISM_DIRECTION: SV
OS_CYLINDER: -2.50
OD_VPRISM_DIRECTION: BF
OD_CYLINDER: -1.75
OD_VPRISM_DIRECTION: SV
OS_AXIS: 108
OS_OVR_VA: 20/
OS_OVR_VA: 20/
OD_AXIS: 106
OD_ADD: +3.00
OS_VPRISM_DIRECTION: BF
OD_AXIS: 108
OS_VPRISM_DIRECTION: SV
OS_CYLINDER: -2.75

## 2024-02-15 ASSESSMENT — LID POSITION - PTOSIS: OD_PTOSIS: +1

## 2024-02-15 ASSESSMENT — CONFRONTATIONAL VISUAL FIELD TEST (CVF)
OS_FINDINGS: FULL
OD_FINDINGS: FULL

## 2024-02-15 ASSESSMENT — REFRACTION_MANIFEST
OS_ADD: +3.00
OS_SPHERE: +1.25
OS_CYLINDER: -2.75
OS_CYLINDER: -2.75
OS_VA1: 20/40-2
OS_VA1: 20/80+
OS_ADD: +350
OS_SPHERE: +1.00
OD_ADD: +3.00
OS_AXIS: 105
OD_SPHERE: PLANO
OS_AXIS: 100

## 2024-02-15 ASSESSMENT — SPHEQUIV_DERIVED
OS_SPHEQUIV: -0.375
OS_SPHEQUIV: 0.625
OS_SPHEQUIV: -0.125

## 2024-02-15 ASSESSMENT — SUPERFICIAL PUNCTATE KERATITIS (SPK)
OS_SPK: T
OD_SPK: 3+ 4+

## 2024-02-15 ASSESSMENT — CORNEAL EDEMA - MICROCYSTIC EPITHELIAL EDEMA (MCE): OD_MCE: ABSENT

## 2024-02-15 ASSESSMENT — REFRACTION_AUTOREFRACTION
OS_CYLINDER: -6.75
OS_AXIS: 106
OS_SPHERE: +4.00
OD_SPHERE: UTP

## 2024-02-15 ASSESSMENT — CORNEAL DYSTROPHY - POSTERIOR: OS_POSTERIORDYSTROPHY: T FUCHS

## 2024-03-01 ENCOUNTER — OFFICE (OUTPATIENT)
Dept: URBAN - METROPOLITAN AREA CLINIC 116 | Facility: CLINIC | Age: 89
Setting detail: OPHTHALMOLOGY
End: 2024-03-01
Payer: MEDICAID

## 2024-03-01 DIAGNOSIS — H52.4: ICD-10-CM

## 2024-03-01 DIAGNOSIS — H40.1122: ICD-10-CM

## 2024-03-01 DIAGNOSIS — H40.1112: ICD-10-CM

## 2024-03-01 PROCEDURE — 92014 COMPRE OPH EXAM EST PT 1/>: CPT | Performed by: OPTOMETRIST

## 2024-03-01 PROCEDURE — 92015 DETERMINE REFRACTIVE STATE: CPT | Performed by: OPTOMETRIST

## 2024-03-01 ASSESSMENT — REFRACTION_CURRENTRX
OD_CYLINDER: -2.50
OD_CYLINDER: -2.25
OD_SPHERE: PLANO
OS_CYLINDER: -2.50
OS_CYLINDER: -2.50
OS_OVR_VA: 20/
OS_SPHERE: +1.00
OS_SPHERE: +1.00
OD_OVR_VA: 20/
OS_AXIS: 095
OD_VPRISM_DIRECTION: SV
OD_CYLINDER: -1.75
OS_AXIS: 108
OS_CYLINDER: -2.75
OS_CYLINDER: -1.75
OD_SPHERE: PLANO
OS_OVR_VA: 20/
OS_OVR_VA: 20/
OS_VPRISM_DIRECTION: SV
OD_CYLINDER: -2.25
OD_OVR_VA: 20/
OD_CYLINDER: -2.25
OD_AXIS: 095
OD_VPRISM_DIRECTION: SV
OS_VPRISM_DIRECTION: SV
OS_AXIS: 103
OD_VPRISM_DIRECTION: BF
OD_SPHERE: -0.25
OS_SPHERE: +1.00
OS_AXIS: 099
OS_SPHERE: +0.50
OD_SPHERE: +0.50
OD_AXIS: 106
OD_AXIS: 109
OD_AXIS: 108
OS_SPHERE: +1.00
OD_OVR_VA: 20/
OD_SPHERE: PLANO
OS_VPRISM_DIRECTION: BF
OS_AXIS: 104
OD_ADD: +3.00
OS_ADD: +3.25
OD_AXIS: 108
OS_CYLINDER: -2.50

## 2024-03-01 ASSESSMENT — REFRACTION_MANIFEST
OS_VA1: 20/80+
OS_AXIS: 100
OS_AXIS: 105
OD_ADD: +3.00
OS_ADD: +350
OS_VA1: 20/60
OS_VA1: 20/40-2
OS_AXIS: 095
OS_CYLINDER: -2.75
OS_SPHERE: +1.00
OS_CYLINDER: -2.75
OD_SPHERE: PLANO
OS_SPHERE: +1.25
OS_CYLINDER: -1.75
OD_VA1: LP
OD_SPHERE: PLANO
OS_ADD: +3.00
OD_ADD: +3.00
OS_SPHERE: +1.00
OS_ADD: +3.00

## 2024-03-01 ASSESSMENT — SPHEQUIV_DERIVED
OS_SPHEQUIV: 0.125
OS_SPHEQUIV: -0.375
OS_SPHEQUIV: -0.125

## 2024-03-01 ASSESSMENT — LID POSITION - PTOSIS: OD_PTOSIS: +1

## 2024-09-04 ENCOUNTER — OFFICE (OUTPATIENT)
Dept: URBAN - METROPOLITAN AREA CLINIC 94 | Facility: CLINIC | Age: 89
Setting detail: OPHTHALMOLOGY
End: 2024-09-04
Payer: MEDICAID

## 2024-09-04 DIAGNOSIS — H40.1122: ICD-10-CM

## 2024-09-04 DIAGNOSIS — H40.1112: ICD-10-CM

## 2024-09-04 PROCEDURE — 99213 OFFICE O/P EST LOW 20 MIN: CPT | Performed by: PHYSICIAN ASSISTANT

## 2024-09-04 ASSESSMENT — CONFRONTATIONAL VISUAL FIELD TEST (CVF)
OS_FINDINGS: FULL
OD_FINDINGS: FULL

## 2024-09-04 ASSESSMENT — LID POSITION - PTOSIS: OD_PTOSIS: +1

## 2024-12-04 ENCOUNTER — OFFICE (OUTPATIENT)
Dept: URBAN - METROPOLITAN AREA CLINIC 94 | Facility: CLINIC | Age: 89
Setting detail: OPHTHALMOLOGY
End: 2024-12-04
Payer: MEDICAID

## 2024-12-04 DIAGNOSIS — H40.1122: ICD-10-CM

## 2024-12-04 DIAGNOSIS — H40.1112: ICD-10-CM

## 2024-12-04 PROCEDURE — 92012 INTRM OPH EXAM EST PATIENT: CPT | Performed by: PHYSICIAN ASSISTANT

## 2024-12-04 PROCEDURE — 92250 FUNDUS PHOTOGRAPHY W/I&R: CPT | Performed by: PHYSICIAN ASSISTANT

## 2024-12-04 ASSESSMENT — PACHYMETRY
OS_CT_CORRECTION: -3
OD_CT_UM: 610
OS_CT_UM: 580
OD_CT_CORRECTION: -5

## 2024-12-04 ASSESSMENT — REFRACTION_CURRENTRX
OD_SPHERE: +0.50
OD_SPHERE: PLANO
OS_AXIS: 099
OS_SPHERE: +1.00
OD_AXIS: 106
OS_CYLINDER: -1.75
OD_AXIS: 095
OS_CYLINDER: -2.50
OS_OVR_VA: 20/
OD_VPRISM_DIRECTION: SV
OS_VPRISM_DIRECTION: BF
OS_SPHERE: +1.00
OD_CYLINDER: -2.25
OS_VPRISM_DIRECTION: SV
OD_OVR_VA: 20/
OS_CYLINDER: -2.75
OS_SPHERE: +1.00
OD_VPRISM_DIRECTION: SV
OS_CYLINDER: -2.50
OD_AXIS: 108
OD_SPHERE: PLANO
OS_VPRISM_DIRECTION: SV
OS_SPHERE: +1.00
OS_AXIS: 104
OD_AXIS: 109
OS_AXIS: 103
OS_SPHERE: +0.50
OS_OVR_VA: 20/
OD_CYLINDER: -2.25
OD_AXIS: 108
OD_SPHERE: -0.25
OD_CYLINDER: -2.50
OD_OVR_VA: 20/
OD_SPHERE: PLANO
OS_ADD: +3.25
OD_ADD: +3.00
OS_CYLINDER: -2.50
OD_CYLINDER: -1.75
OD_VPRISM_DIRECTION: BF
OS_OVR_VA: 20/
OS_AXIS: 095
OD_OVR_VA: 20/
OD_CYLINDER: -2.25
OS_AXIS: 108

## 2024-12-04 ASSESSMENT — CORNEAL DYSTROPHY - POSTERIOR: OS_POSTERIORDYSTROPHY: T FUCHS

## 2024-12-04 ASSESSMENT — CONFRONTATIONAL VISUAL FIELD TEST (CVF)
OD_FINDINGS: FULL
OS_FINDINGS: FULL

## 2024-12-04 ASSESSMENT — REFRACTION_MANIFEST
OS_VA1: 20/80+
OS_CYLINDER: -2.75
OS_AXIS: 095
OS_VA1: 20/40-2
OD_SPHERE: PLANO
OD_ADD: +3.00
OS_SPHERE: +1.00
OS_SPHERE: +1.00
OD_SPHERE: PLANO
OS_ADD: +350
OS_ADD: +3.00
OS_CYLINDER: -2.75
OS_SPHERE: +1.25
OS_CYLINDER: -1.75
OS_AXIS: 100
OS_VA1: 20/60
OD_ADD: +3.00
OS_AXIS: 105
OS_ADD: +3.00
OD_VA1: LP

## 2024-12-04 ASSESSMENT — TONOMETRY
OS_IOP_MMHG: 13
OD_IOP_MMHG: 14

## 2024-12-04 ASSESSMENT — REFRACTION_AUTOREFRACTION
OS_SPHERE: +3.75
OS_AXIS: 107
OS_CYLINDER: -6.75
OD_SPHERE: UTP

## 2024-12-04 ASSESSMENT — CORNEAL EDEMA - MICROCYSTIC EPITHELIAL EDEMA (MCE): OD_MCE: ABSENT

## 2024-12-04 ASSESSMENT — VISUAL ACUITY
OS_BCVA: LP
OD_BCVA: 20/40

## 2024-12-04 ASSESSMENT — KERATOMETRY
OS_K2POWER_DIOPTERS: 41.75
OS_K1POWER_DIOPTERS: 37.00
OD_K1POWER_DIOPTERS: UTP
OS_AXISANGLE_DEGREES: 019

## 2024-12-04 ASSESSMENT — SUPERFICIAL PUNCTATE KERATITIS (SPK)
OS_SPK: T
OD_SPK: 3+ 4+

## 2024-12-04 ASSESSMENT — LID POSITION - PTOSIS: OD_PTOSIS: +1

## 2024-12-10 NOTE — PROCEDURE NOTE - NSFINDINGS_GEN_A_CORE
Patient c/o of abd pain and bloating since last Wednesday. Patient Patient c/o of diarrhea. Arrives a/o x3, appropriate to age. Patient rates pain 7/10.   
positive return of gastric contents
positive return of gastric contents

## 2025-04-09 ENCOUNTER — OFFICE (OUTPATIENT)
Dept: URBAN - METROPOLITAN AREA CLINIC 94 | Facility: CLINIC | Age: OVER 89
Setting detail: OPHTHALMOLOGY
End: 2025-04-09
Payer: MEDICAID

## 2025-04-09 DIAGNOSIS — H40.1132: ICD-10-CM

## 2025-04-09 PROCEDURE — 92133 CPTRZD OPH DX IMG PST SGM ON: CPT | Performed by: PHYSICIAN ASSISTANT

## 2025-04-09 PROCEDURE — 92014 COMPRE OPH EXAM EST PT 1/>: CPT | Performed by: PHYSICIAN ASSISTANT

## 2025-04-09 ASSESSMENT — REFRACTION_CURRENTRX
OS_CYLINDER: -2.75
OS_AXIS: 103
OS_OVR_VA: 20/
OS_CYLINDER: -2.50
OD_OVR_VA: 20/
OS_SPHERE: +1.00
OD_VPRISM_DIRECTION: SV
OD_OVR_VA: 20/
OD_SPHERE: PLANO
OD_VPRISM_DIRECTION: BF
OD_CYLINDER: -1.75
OD_SPHERE: -0.25
OD_CYLINDER: -2.50
OS_AXIS: 104
OD_AXIS: 106
OS_AXIS: 108
OS_SPHERE: +0.50
OD_SPHERE: PLANO
OD_AXIS: 108
OD_ADD: +3.00
OS_CYLINDER: -2.50
OS_SPHERE: +1.00
OS_ADD: +3.25
OD_CYLINDER: -2.25
OS_OVR_VA: 20/
OS_AXIS: 095
OS_SPHERE: +1.00
OS_AXIS: 099
OD_VPRISM_DIRECTION: SV
OD_AXIS: 108
OD_SPHERE: +0.50
OD_OVR_VA: 20/
OD_CYLINDER: -2.25
OS_VPRISM_DIRECTION: SV
OD_AXIS: 095
OS_VPRISM_DIRECTION: BF
OD_SPHERE: PLANO
OS_CYLINDER: -2.50
OS_CYLINDER: -1.75
OS_VPRISM_DIRECTION: SV
OS_OVR_VA: 20/
OD_CYLINDER: -2.25
OS_SPHERE: +1.00
OD_AXIS: 109

## 2025-04-09 ASSESSMENT — REFRACTION_MANIFEST
OD_ADD: +3.00
OS_SPHERE: +1.00
OS_ADD: +3.00
OS_SPHERE: +1.00
OS_AXIS: 100
OS_VA1: 20/80+
OD_ADD: +3.00
OS_AXIS: 105
OS_VA1: 20/60
OS_ADD: +350
OD_SPHERE: PLANO
OD_VA1: LP
OS_ADD: +3.00
OS_SPHERE: +1.25
OD_SPHERE: PLANO
OS_CYLINDER: -2.75
OS_VA1: 20/40-2
OS_CYLINDER: -2.75
OS_CYLINDER: -1.75
OS_AXIS: 095

## 2025-04-09 ASSESSMENT — KERATOMETRY
OS_K1POWER_DIOPTERS: UTP
OD_K1POWER_DIOPTERS: UTP

## 2025-04-09 ASSESSMENT — PACHYMETRY
OD_CT_UM: 610
OS_CT_UM: 580
OD_CT_CORRECTION: -5
OS_CT_CORRECTION: -3

## 2025-04-09 ASSESSMENT — REFRACTION_AUTOREFRACTION
OD_SPHERE: UTP
OS_SPHERE: UTP

## 2025-04-09 ASSESSMENT — LID POSITION - PTOSIS: OD_PTOSIS: +1

## 2025-04-09 ASSESSMENT — SUPERFICIAL PUNCTATE KERATITIS (SPK)
OS_SPK: T
OD_SPK: 3+ 4+

## 2025-04-09 ASSESSMENT — CORNEAL EDEMA - MICROCYSTIC EPITHELIAL EDEMA (MCE): OD_MCE: ABSENT

## 2025-04-09 ASSESSMENT — CONFRONTATIONAL VISUAL FIELD TEST (CVF)
OD_FINDINGS: FULL
OS_FINDINGS: FULL

## 2025-04-09 ASSESSMENT — VISUAL ACUITY
OS_BCVA: LP
OD_BCVA: 20/40

## 2025-04-09 ASSESSMENT — CORNEAL DYSTROPHY - POSTERIOR: OS_POSTERIORDYSTROPHY: T FUCHS